# Patient Record
Sex: FEMALE | Race: WHITE | NOT HISPANIC OR LATINO | Employment: FULL TIME | URBAN - METROPOLITAN AREA
[De-identification: names, ages, dates, MRNs, and addresses within clinical notes are randomized per-mention and may not be internally consistent; named-entity substitution may affect disease eponyms.]

---

## 2017-01-18 ENCOUNTER — PRENATAL OFFICE VISIT (OUTPATIENT)
Dept: OBGYN | Facility: CLINIC | Age: 47
End: 2017-01-18
Payer: COMMERCIAL

## 2017-01-18 VITALS
WEIGHT: 174 LBS | OXYGEN SATURATION: 98 % | HEART RATE: 65 BPM | SYSTOLIC BLOOD PRESSURE: 109 MMHG | DIASTOLIC BLOOD PRESSURE: 72 MMHG | BODY MASS INDEX: 28.96 KG/M2

## 2017-01-18 DIAGNOSIS — N93.9 ABNORMAL UTERINE BLEEDING: Primary | ICD-10-CM

## 2017-01-18 LAB — HGB BLD-MCNC: 15.7 G/DL (ref 11.7–15.7)

## 2017-01-18 PROCEDURE — 36415 COLL VENOUS BLD VENIPUNCTURE: CPT | Performed by: OBSTETRICS & GYNECOLOGY

## 2017-01-18 PROCEDURE — 99203 OFFICE O/P NEW LOW 30 MIN: CPT | Performed by: OBSTETRICS & GYNECOLOGY

## 2017-01-18 PROCEDURE — 85018 HEMOGLOBIN: CPT | Performed by: OBSTETRICS & GYNECOLOGY

## 2017-01-18 NOTE — MR AVS SNAPSHOT
After Visit Summary   1/18/2017    Carol Ann Anguiano    MRN: [id number]           Patient Information     Date Of Birth          1970        Visit Information        Provider Department      1/18/2017 9:15 AM Javier Huynh MD [facility]        Today's Diagnoses     Abnormal uterine bleeding    -  1        Follow-ups after your visit        Your next 10 appointments already scheduled     Jan 25, 2017  9:00 AM   US PELVIC COMPLETE W TRANSVAGINAL with [id number]   [facility]    [address] [place] [id number]   [phone]           Please bring a list of your medicines (including vitamins, minerals and over-the-counter drugs). Also, tell your doctor about any allergies you may have. Wear comfortable clothes and leave your valuables at home.  Adults: Drink four 8-ounce glasses of fluid one hour before your exam. Do NOT empty your bladder.  If you need to empty your bladder before your exam, try to release only a little bit of urine. Then, drink another 8oz glass of fluid.  Children: Children who are potty trained should drink at least 4 cups (32 oz) of liquid 45 minutes to one hour prior to the exam. The child s bladder must be full in order to achieve a diagnostic exam. If your child is very uncomfortable or has an urgent need to pee, please notify a technologist; they will try to find out how much longer the wait may be and provide instructions to help relieve the pressure. Occasionally it is medically necessary to insert a urinary catheter to fill the bladder.  Please call the Imaging Department at your exam site with any questions.              Future tests that were ordered for you today     Open Future Orders        Priority Expected Expires Ordered    US Pelvic Complete with Transvaginal Routine  1/18/2018 1/18/2017            Who to contact     If you have questions or need follow up information about today's  clinic visit or your schedule please contact [facility] directly at [phone].  Normal or non-critical lab and imaging results will be communicated to you by MyChart, letter or phone within 4 business days after the clinic has received the results. If you do not hear from us within 7 days, please contact the clinic through [facility] or phone. If you have a critical or abnormal lab result, we will notify you by phone as soon as possible.  Submit refill requests through [employer] or call your pharmacy and they will forward the refill request to us. Please allow 3 business days for your refill to be completed.          Additional Information About Your Visit        [facility] Information     [employer] gives you secure access to your electronic health record. If you see a primary care provider, you can also send messages to your care team and make appointments. If you have questions, please call your primary care clinic.  If you do not have a primary care provider, please call [phone] and they will assist you.        Care EveryWhere ID     This is your Care EveryWhere ID. This could be used by other organizations to access your [place] medical records  [id number]        Your Vitals Were     Pulse Pulse Oximetry Last Period             65 98% 01/06/2017          Blood Pressure from Last 3 Encounters:   01/18/17 109/72   10/07/16 118/72   07/25/16 122/78    Weight from Last 3 Encounters:   01/18/17 174 lb (78.926 kg)   12/07/16 165 lb 6.4 oz (75.025 kg)   10/07/16 164 lb 8 oz (74.617 kg)              We Performed the Following     Hemoglobin        Primary Care Provider Office Phone # Fax #    Roby Charles PA-C [phone]       [address] [address]        Thank you!     Thank you for choosing [facility]  for your care. Our goal is always to provide you with excellent care. Hearing back from our patients is one way we  can continue to improve our services. Please take a few minutes to complete the written survey that you may receive in the mail after your visit with us. Thank you!             Your Updated Medication List - Protect others around you: Learn how to safely use, store and throw away your medicines at www.disposemymeds.org.          This list is accurate as of: 1/18/17 10:27 AM.  Always use your most recent med list.                   Brand Name Dispense Instructions for use    albuterol 108 (90 BASE) MCG/ACT Inhaler    PROAIR HFA/PROVENTIL HFA/VENTOLIN HFA    1 Inhaler    Inhale 2 puffs into the lungs every 6 hours as needed for shortness of breath / dyspnea or wheezing       clotrimazole 1 % cream    LOTRIMIN    15 g    Apply a pea sized amount to ear canal, twice daily       fluocinolone acetonide 0.01 % Oil     20 mL    Place 5 drops in ear(s) 2 times daily       hydrocortisone 25 MG Suppository    ANUSOL-HC    28 suppository    Place 1 suppository (25 mg) rectally 2 times daily       ketoconazole 2 % cream    NIZORAL    30 g    Apply topically 2 times daily for 28 days       levothyroxine 200 MCG tablet    SYNTHROID/LEVOTHROID    90 tablet    Take 1 tablet (200 mcg) by mouth daily       neomycin-polymyxin-hydrocortisone 3.5-20410-5 otic suspension    CORTISPORIN    10 mL    Place 4 drops in ear(s) 4 times daily       omeprazole 40 MG capsule    priLOSEC    90 capsule    Take 1 capsule (40 mg) by mouth daily Take 30-60 minutes before a meal.       ondansetron 4 MG tablet    ZOFRAN    30 tablet    1 to 2 as needed for nausea

## 2017-01-18 NOTE — PROGRESS NOTES
Carol Ann is a 46 year old  referred here by LUIS Venegas with complaint of abnormal uterine bleeding.    She reports that the bleeding is heavier and last longer.  The bleeding occurs every 14-15 days.  She has quarter sized blood clots the first couple of days.  She did not have blood clots previously (occasionally small clots).   She will use about 8-9 pads the first couple of days.  She changes the pads more frequently due to effects of pad use on the perineum.    No aggravating of alleviating factors.  She does not know of any causative effects.    She does have some lower back and cramping pains the first day or two of bleeding.    Upon review of the chart, it appears she had some irregular bleeding previously, as she had some workup in .      Previously she would have bleeding every 25 days and they would last for 4 days.  She did not get cramps previously.      She occasionally feels off balance going up stairs, not often.  She has not had any shortness of breath.  She has noticed some fatigue, a couple times a week.        Component      Latest Ref Rng 3/3/2015 2015 2016 2016 10/7/2016   WBC      4.0 - 11.0 10e9/L 8.0       RBC Count      3.8 - 5.2 10e12/L 4.54       Hemoglobin      11.7 - 15.7 g/dL 14.0       Hematocrit      35.0 - 47.0 % 41.3       MCV      78 - 100 fl 91       MCH      26.5 - 33.0 pg 30.8       MCHC      31.5 - 36.5 g/dL 33.9       RDW      10.0 - 15.0 % 14.7       Platelet Count      150 - 450 10e9/L 296       Iron      35 - 180 ug/dL     54   Iron Binding Cap      240 - 430 ug/dL     379   Iron Saturation Index      15 - 46 %     14 (L)   TSH      0.40 - 4.00 mU/L 19.75 (H) <0.01 (L) 0.07 (L) 0.02 (L) 0.37 (L)   T4 Free      0.76 - 1.46 ng/dL  1.78 (H)  1.39 1.32   Free T3      2.3 - 4.2 pg/mL    2.7 2.1 (L)   Ferritin      8 - 252 ng/mL     36       The patient and I reviewed the following report and the ultrasound films.    Jacqueline Marsh on 3/13/2015  3:37 PM       ULTRASOUND - PELVIC GYN    Referring MD:Roby Charles PA-C  Primary Clinic: [facility]      ===================================    CLINICAL INFORMATION    Indications for ultrasound:   Irreg menses    LMP: 1 mo. ago    Measurements:  Uterus: L: 8.1cm. T: 4.1cm. H:3.8 cm  Position is anteverted.     Endo cav: 11.5 mm     Right ovary: L 2.0 cm. T: 1.6 cm. H:1.1 cm  Left ovary: L: 3.0 cm. T: 2.1 cm. H:1.9 cm    Simple cyst Lt. Ovary:1.3 x 1.6 x 1.3 cm  Cul de sac: no free fluid      ===================================  SONOGRAPHER NARRATIVE:   Type of ultrasound: Transvaginal                Assessment:      The uterus and ovaries were visualized and no abnormalities were seen.  The left ovary contains a dominant follicle.    Radha Mcdaniel           Past Medical History   Diagnosis Date     Unspecified hypothyroidism      ASTHMA - MILD INTERMITTENT 2007     Coronary artery disease      Grandfather and all 3 uncles have had heart attacks     Hypertension      Mother and grandmother     Family history of cancer      grandfather     Arthritis      great aunt     Depressive disorder      myself and my mother       Past Surgical History   Procedure Laterality Date     Cholecystectomy, laporoscopic       Cholecystectomy, Laparoscopic     Lasik bilateral         Obstetric History       T1      TAB0   SAB0   E0   M0   L2       # Outcome Date GA Lbr Fabricio/2nd Weight Sex Delivery Anes PTL Lv   2 Term            1                    Gynecological History         Patient's last menstrual period was 2017.    noSTD/no PID/no IUD      see above HPI          Allergies   Allergen Reactions     Chantix [Varenicline]      Stomach cramps      Eryc [Erythromycin]      dry heaves  nausea       Current Outpatient Prescriptions   Medication Sig Dispense Refill     fluocinolone acetonide 0.01 % OIL Place 5 drops in ear(s) 2 times daily 20 mL 1     albuterol (PROAIR HFA,  PROVENTIL HFA, VENTOLIN HFA) 108 (90 BASE) MCG/ACT inhaler Inhale 2 puffs into the lungs every 6 hours as needed for shortness of breath / dyspnea or wheezing 1 Inhaler 1     clotrimazole (LOTRIMIN) 1 % cream Apply a pea sized amount to ear canal, twice daily 15 g 1     levothyroxine (SYNTHROID, LEVOTHROID) 200 MCG tablet Take 1 tablet (200 mcg) by mouth daily 90 tablet 2     neomycin-polymyxin-hydrocortisone (CORTISPORIN) 3.5-14752-4 otic suspension Place 4 drops in ear(s) 4 times daily 10 mL 0     omeprazole (PRILOSEC) 40 MG capsule Take 1 capsule (40 mg) by mouth daily Take 30-60 minutes before a meal. 90 capsule 0     ketoconazole (NIZORAL) 2 % cream Apply topically 2 times daily for 28 days 30 g 3     hydrocortisone (ANUSOL-HC) 25 MG suppository Place 1 suppository (25 mg) rectally 2 times daily 28 suppository 0     ondansetron (ZOFRAN) 4 MG tablet 1 to 2 as needed for nausea 30 tablet 0       Social History     Social History     Marital Status:      Spouse Name: Saul     Number of Children: 2     Years of Education: 14     Occupational History      [employer]     Social History Main Topics     Smoking status: Current Every Day Smoker -- 1.00 packs/day for 15 years     Types: Cigarettes     Smokeless tobacco: Never Used     Alcohol Use: 0.0 oz/week     0 Standard drinks or equivalent per week      Comment: occasionally     Drug Use: No     Sexual Activity:     Partners: Male     Birth Control/ Protection: None      Comment: my  has a vasectomy     Other Topics Concern     Parent/Sibling W/ Cabg, Mi Or Angioplasty Before 65f 55m? No     Social History Narrative    Dairy/d 3-4 servings/d.     Caffeine 1 servings/d    Exercise 0 x week very active    Sunscreen used - Yes    Seatbelts used - Yes    Working smoke/CO detectors in the home - Yes    Guns stored in the home - No    Self Breast Exams - Yes    Self Testicular Exam - NA    Eye Exam up to date - Due for an  appointment.     Dental Exam up to date - Due for an appointment.     Pap Smear up to date - Yes    Mammogram up to date - n/a    PSA up to date - NA    Dexa Scan up to date - NA    Flex Sig / Colonoscopy up to date - NA    Immunizations up to date - Yes    Abuse: Current or Past(Physical, Sexual or Emotional)- No    Do you feel safe in your environment - Yes    Saul PriceDREA santos    [birth year]07           Family History   Problem Relation Age of Onset     CANCER Paternal Grandfather      lungs/throat     Psychotic Disorder Son      adhd/ocd     DIABETES Father      Hypertension Mother      Hypertension Maternal Grandmother      Depression Mother      Asthma Other      Thyroid Disease Other          Review of Systems:  10 point ROS of systems including Constitutional, Eyes, Respiratory, Cardiovascular, Gastroenterology, Genitourinary, Integumentary, Muscularskeletal, Psychiatric were all negative except for pertinent positives noted in my HPI and in the PMH.          EXAM:  /72 mmHg  Pulse 65  Wt 174 lb (78.926 kg)  SpO2 98%  LMP 01/06/2017  Body mass index is 28.96 kg/(m^2).  General Appearance:  healthy, alert, active, no distress  Neck:  No masses or lesions carotids are +2/4. No bruits heard  Skin:  Normal skin turgor  Neuro:  Alert, cranial nerves grossly intact  HEENT: NCAT  Pelvic exam:  Not performed today   Extremities:  No clubbing, cyanosis or edema.      ASSESSMENT:  Abnormal uterine bleeding     PLAN:  We discussed the bleeding profiles as people age and approach menopause.  Even though menstrual changes and irregularities are common and expected, they may also indicate or precipitate endometrial abnormalities.   The patient and I discussed the options for evaluation.  The EMB, SIS and the D&C were reviewed with her.  The EMB will not remove a polyp, but will give a tissue sample.  The SIS will further evaluate the lining, but no tissue sample, and should she have a suspected polyp, it would not be  removed.  The D&C is more expensive but is currently the gold standard.   She will think about these further and we will review at her next visit.   Together we reviewed the risks and benefits of medical versus surgical therapy.  Medical therapy reviewed included hormonal manipulation with OCP's, Patch, Ring, Depo, or IUD.     Schedule for the repeat ultrasound  hgb today  RTC for further discussion of the results and options.    TT 30 min  CT and review of records, as noted in the HPI and in the Plan, greater than 50%  Javier Huynh MD

## 2017-01-18 NOTE — NURSING NOTE
"Chief Complaint   Patient presents with     Consult     Abnormal bleeding per Dr. Charles       Initial /72 mmHg  Pulse 65  Wt 174 lb (78.926 kg)  SpO2 98%  LMP 01/06/2017 Estimated body mass index is 28.96 kg/(m^2) as calculated from the following:    Height as of 12/7/16: 5' 5\" (1.651 m).    Weight as of this encounter: 174 lb (78.926 kg).  BP completed using cuff size: brandy Mason MA 1/18/2017         "

## 2017-01-25 ENCOUNTER — RADIANT APPOINTMENT (OUTPATIENT)
Dept: ULTRASOUND IMAGING | Facility: CLINIC | Age: 47
End: 2017-01-25
Attending: OBSTETRICS & GYNECOLOGY
Payer: COMMERCIAL

## 2017-01-25 DIAGNOSIS — N93.9 ABNORMAL UTERINE BLEEDING: ICD-10-CM

## 2017-01-25 PROCEDURE — 76856 US EXAM PELVIC COMPLETE: CPT

## 2017-01-25 PROCEDURE — 76830 TRANSVAGINAL US NON-OB: CPT

## 2017-02-03 ENCOUNTER — OFFICE VISIT (OUTPATIENT)
Dept: OBGYN | Facility: CLINIC | Age: 47
End: 2017-02-03
Payer: COMMERCIAL

## 2017-02-03 VITALS
SYSTOLIC BLOOD PRESSURE: 122 MMHG | DIASTOLIC BLOOD PRESSURE: 76 MMHG | OXYGEN SATURATION: 98 % | HEART RATE: 80 BPM | HEIGHT: 65 IN | WEIGHT: 166.2 LBS | BODY MASS INDEX: 27.69 KG/M2

## 2017-02-03 DIAGNOSIS — N93.9 ABNORMAL UTERINE BLEEDING: Primary | ICD-10-CM

## 2017-02-03 DIAGNOSIS — R93.5 ABNORMAL ULTRASOUND OF UTERUS: ICD-10-CM

## 2017-02-03 PROCEDURE — 99214 OFFICE O/P EST MOD 30 MIN: CPT | Performed by: OBSTETRICS & GYNECOLOGY

## 2017-02-03 NOTE — NURSING NOTE
"Chief Complaint   Patient presents with     RECHECK     follow up US results possible EMB       Initial /76 mmHg  Pulse 80  Ht 5' 5\" (1.651 m)  Wt 166 lb 3.2 oz (75.388 kg)  BMI 27.66 kg/m2  SpO2 98%  LMP 01/02/2017  Breastfeeding? No Estimated body mass index is 27.66 kg/(m^2) as calculated from the following:    Height as of this encounter: 5' 5\" (1.651 m).    Weight as of this encounter: 166 lb 3.2 oz (75.388 kg).  BP completed using cuff size: brandy Mason MA 2/3/2017         "

## 2017-02-03 NOTE — PROGRESS NOTES
Carol Ann is a 46 year old , who was initially referred here by LUIS Venegas with complaint of abnormal uterine bleeding.  she is here today for follow up of the abnormal bleeding.     As noted previously, the bleeding is heavier and last longer.  The bleeding occurs every 14-15 days.  She has quarter sized blood clots the first couple of days.  She did not have blood clots previously (occasionally small clots).    She will use about 8-9 pads the first couple of days.  She changes the pads more frequently due to effects of pad use on the perineum.     No aggravating of alleviating factors.  She does not know of any causative effects.     She does have some lower back and cramping pains the first day or two of bleeding.     Upon review of the chart, it appears she had some irregular bleeding previously, as she had some workup in .      Previously she would have bleeding every 25 days and they would last for 4 days.  She did not get cramps previously.      She occasionally feels off balance going up stairs, not often.  She has not had any shortness of breath.  She has noticed some fatigue, a couple times a week.          Component      Latest Ref Rng  3/3/2015  2015  2016  2016  10/7/2016    WBC      4.0 - 11.0 10e9/L  8.0            RBC Count      3.8 - 5.2 10e12/L  4.54            Hemoglobin      11.7 - 15.7 g/dL  14.0            Hematocrit      35.0 - 47.0 %  41.3            MCV      78 - 100 fl  91            MCH      26.5 - 33.0 pg  30.8            MCHC      31.5 - 36.5 g/dL  33.9            RDW      10.0 - 15.0 %  14.7            Platelet Count      150 - 450 10e9/L  296            Iron      35 - 180 ug/dL          54    Iron Binding Cap      240 - 430 ug/dL          379    Iron Saturation Index      15 - 46 %          14 (L)    TSH      0.40 - 4.00 mU/L  19.75 (H)  <0.01 (L)  0.07 (L)  0.02 (L)  0.37 (L)    T4 Free      0.76 - 1.46 ng/dL    1.78 (H)    1.39  1.32    Free T3       2.3 - 4.2 pg/mL        2.7  2.1 (L)    Ferritin      8 - 252 ng/mL          36        The patient and I reviewed the following report and the ultrasound films.    Jacqueline Marsh on 3/13/2015 3:37 PM          ULTRASOUND - PELVIC GYN    Referring MD:Roby Charles PA-C  Primary Clinic: [facility]      ===================================    CLINICAL INFORMATION    Indications for ultrasound:   Irreg menses    LMP: 1 mo. ago    Measurements:  Uterus: L: 8.1cm. T: 4.1cm. H:3.8 cm  Position is anteverted.     Endo cav: 11.5 mm     Right ovary: L 2.0 cm. T: 1.6 cm. H:1.1 cm  Left ovary: L: 3.0 cm. T: 2.1 cm. H:1.9 cm    Simple cyst Lt. Ovary:1.3 x 1.6 x 1.3 cm  Cul de sac: no free fluid      ===================================  SONOGRAPHER NARRATIVE:   Type of ultrasound: Transvaginal                   Assessment:      The uterus and ovaries were visualized and no abnormalities were seen.  The left ovary contains a dominant follicle.       She had ultrasound and the following was noted.  We reviewed the ultrasound report and the ultrasound films.      ULTRASOUND PELVIC COMPLETE WITH TRANSVAGINAL IMAGING 1/25/2017 9:30 AM  HISTORY: Frequent, heavy bleeding. Abnormal uterine and vaginal bleeding, unspecified.  FINDINGS:  Transvaginal images were performed to better evaluate the patient's uterus, ovaries and endometrial stripe.  The uterus is normal in size measuring 7.1 x 4.2 x 4.2 cm. There is a myometrial cyst measuring 0.6 cm in maximal diameter in the anterior uterine body. Endometrial stripe measures 11 mm and is mildly thickened for patient's age. A slight hypoechoic area in the fundal myometrium could represent a small submucosal fibroid, but more likely a polyp measuring 0.7 x 0.5 x 0.7 cm. The right ovary is normal measuring 3.0 x 2.9 x 1.6 cm. The left ovary is normal measuring 1.8 x 1.8 x 1.0 cm. The ovaries demonstrate normal color Doppler flow bilaterally. No adnexal masses are present. No  free pelvic fluid is present.                                                               IMPRESSION: Suspected endometrial polyp in the region of the fundal endometrium. Suspected polyp measures up to 0.7 cm in size. No endometrial fluid is evident. Follow-up hysterosonogram could be performed for further assessment if clinically warranted. Small submucosal fibroid remains in the differential. Ovaries are unremarkable. Small cyst anterior uterine body is of doubtful clinical significance.      Past Medical History   Diagnosis Date     Unspecified hypothyroidism      ASTHMA - MILD INTERMITTENT 6/11/2007     Coronary artery disease      Grandfather and all 3 uncles have had heart attacks     Hypertension      Mother and grandmother     Family history of cancer      grandfather     Arthritis      great aunt     Depressive disorder      myself and my mother     Past Surgical History   Procedure Laterality Date     Cholecystectomy, laporoscopic  2008     Cholecystectomy, Laparoscopic     Lasik bilateral          Allergies   Allergen Reactions     Chantix [Varenicline]      Stomach cramps      Eryc [Erythromycin]      dry heaves  nausea       Current Outpatient Prescriptions   Medication Sig Dispense Refill     fluocinolone acetonide 0.01 % OIL Place 5 drops in ear(s) 2 times daily 20 mL 1     albuterol (PROAIR HFA, PROVENTIL HFA, VENTOLIN HFA) 108 (90 BASE) MCG/ACT inhaler Inhale 2 puffs into the lungs every 6 hours as needed for shortness of breath / dyspnea or wheezing 1 Inhaler 1     hydrocortisone (ANUSOL-HC) 25 MG suppository Place 1 suppository (25 mg) rectally 2 times daily 28 suppository 0     clotrimazole (LOTRIMIN) 1 % cream Apply a pea sized amount to ear canal, twice daily 15 g 1     levothyroxine (SYNTHROID, LEVOTHROID) 200 MCG tablet Take 1 tablet (200 mcg) by mouth daily 90 tablet 2     neomycin-polymyxin-hydrocortisone (CORTISPORIN) 3.5-53039-1 otic suspension Place 4 drops in ear(s) 4 times daily 10  mL 0     omeprazole (PRILOSEC) 40 MG capsule Take 1 capsule (40 mg) by mouth daily Take 30-60 minutes before a meal. 90 capsule 0     ondansetron (ZOFRAN) 4 MG tablet 1 to 2 as needed for nausea 30 tablet 0       Social History     Social History     Marital Status:      Spouse Name: Saul     Number of Children: 2     Years of Education: 14     Occupational History      [employer]     Social History Main Topics     Smoking status: Current Every Day Smoker -- 1.00 packs/day for 15 years     Types: Cigarettes     Smokeless tobacco: Never Used     Alcohol Use: 0.0 oz/week     0 Standard drinks or equivalent per week      Comment: occasionally     Drug Use: No     Sexual Activity:     Partners: Male     Birth Control/ Protection: None      Comment: my  has a vasectomy     Other Topics Concern     Parent/Sibling W/ Cabg, Mi Or Angioplasty Before 65f 55m? No     Social History Narrative    Dairy/d 3-4 servings/d.     Caffeine 1 servings/d    Exercise 0 x week very active    Sunscreen used - Yes    Seatbelts used - Yes    Working smoke/CO detectors in the home - Yes    Guns stored in the home - No    Self Breast Exams - Yes    Self Testicular Exam - NA    Eye Exam up to date - Due for an appointment.     Dental Exam up to date - Due for an appointment.     Pap Smear up to date - Yes    Mammogram up to date - n/a    PSA up to date - NA    Dexa Scan up to date - NA    Flex Sig / Colonoscopy up to date - NA    Immunizations up to date - Yes    Abuse: Current or Past(Physical, Sexual or Emotional)- No    Do you feel safe in your environment - Yes    Saul Blackman MA    9/21/07           Family History   Problem Relation Age of Onset     CANCER Paternal Grandfather      lungs/throat     Psychotic Disorder Son      adhd/ocd     DIABETES Father      Hypertension Mother      Hypertension Maternal Grandmother      Depression Mother      Asthma Other      Thyroid Disease Other   "      Review of Systems:  10 point ROS of systems including Constitutional, Eyes, Respiratory, Cardiovascular, Gastroenterology, Genitourinary, Integumentary, Muscularskeletal, Psychiatric were all negative except for pertinent positives noted in my HPI and in the PMH.      Exam  /76 mmHg  Pulse 80  Ht 5' 5\" (1.651 m)  Wt 166 lb 3.2 oz (75.388 kg)  BMI 27.66 kg/m2  SpO2 98%  LMP 01/02/2017  Breastfeeding? No  General:  WNWD female, NAD  Alert  Oriented x 3  Gait:  Normal  Skin:  Normal skin turgor  HEENT:  NC/AT, EOMI  Abdomen:  Non-tender, non-distended.  Pelvic exam:  Not performed  Extremities:  No clubbing, no cyanosis and no edema.        Assessment  Abnormal uterine bleeding  Suspected endometrial polyp      Plan  We reviewed the history and the ultrasound findings. With a suspected polyp, I would suggest to proceed with the D&C with hysteroscopy.  The ACOG pamphlet was given to her and reviewed with her.    The risks and benefits and goals and limitations were reviewed.  The polyp might be the source of the bleeding.  However, she is also at the age where hormonal irregularities might be the cause also.  She will do the D&C with hysteroscopy and if the irregular/abnormal bleeding continues, then we should consider hormonal management.    The risks of the planned procedure include, but are not limited to, death, brain damage, paralysis of any or all limbs, loss of an organ, function of an organ, disfiguring scars, bleeding, infection, damage to the uterus, tubes, ovaries, bowel, bladder, cervix and vagina.  In addition, there is a possibility of other procedures that might be deemed necessary at the time of the surgery, depending upon findings and/or complications.  This may also include laparoscopy, laparotomy, and rarely colostomy (which often times can be reversible in the future).  Risks with blood include but are not limited to HIV/AIDS, hepatitis and transfusion reactions, with transfusion " reactions being the greatest risk.  She will schedule the preop with Roby Charles, her PCP.     Javier Huynh MD

## 2017-02-07 PROBLEM — N93.9 ABNORMAL UTERINE BLEEDING: Status: ACTIVE | Noted: 2017-02-07

## 2017-02-14 ENCOUNTER — TELEPHONE (OUTPATIENT)
Dept: OBGYN | Facility: CLINIC | Age: 47
End: 2017-02-14

## 2017-02-14 NOTE — TELEPHONE ENCOUNTER
Surgery Scheduled.    Date of Surgery 3/30/17 Time of Surgery 7:30 am  Procedure: Hysteroscopy/ D&C  Hospital/Surgical Facility[place] [facility]  Surgeon: Dr. Huynh  Type of Anesthesia Anticipated: Local with MAC  Pre-op: 2/20/17 with Gurjit Charles at [facility]  2 week post op: 4/18/17 with Dr. Huynh at  [facility]  Pre-certification 2/16/17  Consent signed: NA  Hospital Stay NA       [facility] surgery packet mailed to patient's home address.  Patient instructed NPO 12 hours prior to surgery, arrive 1 1/2 hours prior to surgery, must have a .  Patient understood and agrees to the plan.      Surgery Pre-Certification     Medical Record Number: [id number]   Carol Ann Anguiano   YOB: 1970   Phone: [phone] (home) [phone] (work)   Primary Provider: Roby Charles     Reason for Admit:   Abnormal Bleeding Problem/ Endometrial Polyp     Surgeon: MAHNAZ Huynh MD   Surgical Procedure: Hysteroscopy/ D&C   ICD-9 Coded: N93.9/ N84.0   Date of Surgery: 3/30/17   Consent signed? N/A       Hospital: [facility]   Outpatient     Requestor:   Marika Parmar     Location:   [facility]   Lynne Vnaessa CMA

## 2017-02-20 ENCOUNTER — OFFICE VISIT (OUTPATIENT)
Dept: FAMILY MEDICINE | Facility: CLINIC | Age: 47
End: 2017-02-20
Payer: COMMERCIAL

## 2017-02-20 VITALS
SYSTOLIC BLOOD PRESSURE: 120 MMHG | HEIGHT: 65 IN | WEIGHT: 171 LBS | HEART RATE: 76 BPM | BODY MASS INDEX: 28.49 KG/M2 | TEMPERATURE: 98.7 F | DIASTOLIC BLOOD PRESSURE: 80 MMHG

## 2017-02-20 DIAGNOSIS — K64.9 HEMORRHOIDS, UNSPECIFIED HEMORRHOID TYPE: ICD-10-CM

## 2017-02-20 DIAGNOSIS — R11.0 NAUSEA: ICD-10-CM

## 2017-02-20 DIAGNOSIS — J45.20 INTERMITTENT ASTHMA, UNCOMPLICATED: ICD-10-CM

## 2017-02-20 DIAGNOSIS — L98.9 BENIGN SKIN LESION OF NECK: Primary | ICD-10-CM

## 2017-02-20 PROCEDURE — 17110 DESTRUCTION B9 LES UP TO 14: CPT | Performed by: PHYSICIAN ASSISTANT

## 2017-02-20 PROCEDURE — 99213 OFFICE O/P EST LOW 20 MIN: CPT | Mod: 25 | Performed by: PHYSICIAN ASSISTANT

## 2017-02-20 RX ORDER — ALBUTEROL SULFATE 90 UG/1
2 AEROSOL, METERED RESPIRATORY (INHALATION) EVERY 6 HOURS PRN
Qty: 1 INHALER | Refills: 1 | Status: SHIPPED | OUTPATIENT
Start: 2017-02-20 | End: 2017-04-20

## 2017-02-20 RX ORDER — OMEPRAZOLE 40 MG/1
40 CAPSULE, DELAYED RELEASE ORAL DAILY
Qty: 90 CAPSULE | Refills: 3 | Status: SHIPPED | OUTPATIENT
Start: 2017-02-20 | End: 2019-03-29

## 2017-02-20 RX ORDER — HYDROCORTISONE ACETATE 25 MG/1
25 SUPPOSITORY RECTAL 2 TIMES DAILY
Qty: 28 SUPPOSITORY | Refills: 0 | Status: SHIPPED | OUTPATIENT
Start: 2017-02-20 | End: 2017-03-28

## 2017-02-20 NOTE — MR AVS SNAPSHOT
After Visit Summary   2/20/2017    Carol Ann Anguiano    MRN: [id number]           Patient Information     Date Of Birth          1970        Visit Information        Provider Department      2/20/2017 1:40 PM Roby Charles PA-C [facility]        Today's Diagnoses     Benign skin lesion of neck    -  1    Intermittent asthma, uncomplicated        Hemorrhoids, unspecified hemorrhoid type        Nausea           Follow-ups after your visit        Your next 10 appointments already scheduled     Mar 20, 2017  3:40 PM CDT   [facility] Visit with Roby Charles PA-C   [facility]    [address] [id number] [phone]           Bring a current list of meds and any records pertaining to this visit.  For Physicals, please bring immunization records and any forms needing to be filled out.  Please arrive 10 minutes early to complete paperwork.            Apr 18, 2017  3:00 PM CDT   Post Op with Javier Huynh MD   [facility] ([address] [id number] [phone]              Who to contact     If you have questions or need follow up information about today's clinic visit or your schedule please contact [facility] directly at [phone].  Normal or non-critical lab and imaging results will be communicated to you by MyChart, letter or phone within 4 business days after the clinic has received the results. If you do not hear from us within 7 days, please contact the clinic through MyChart or phone. If you have a critical or abnormal lab result, we will notify you by phone as soon as possible.  Submit refill requests through [employer] or call your pharmacy and they will forward the refill request to us. Please allow 3 business days for your refill to be completed.          Additional Information About Your Visit    "     MyChart Information     [employer] gives you secure access to your electronic health record. If you see a primary care provider, you can also send messages to your care team and make appointments. If you have questions, please call your primary care clinic.  If you do not have a primary care provider, please call [phone] and they will assist you.        Care EveryWhere ID     This is your Care EveryWhere ID. This could be used by other organizations to access your [place] medical records  [id number]        Your Vitals Were     Pulse Temperature Height Last Period BMI (Body Mass Index)       76 98.7  F (37.1  C) (Oral) 5' 5\" (1.651 m) 01/02/2017 28.46 kg/m2        Blood Pressure from Last 3 Encounters:   02/20/17 120/80   02/03/17 122/76   01/18/17 109/72    Weight from Last 3 Encounters:   02/20/17 171 lb (77.6 kg)   02/03/17 166 lb 3.2 oz (75.4 kg)   01/18/17 174 lb (78.9 kg)              We Performed the Following     DESTRUCT BENIGN LESION, UP TO 14          Where to get your medicines      These medications were sent to [id number] [place]  [address] AT [facility] & [address]  [address] [place] [address]     Phone:  [phone]     albuterol 108 (90 BASE) MCG/ACT Inhaler    hydrocortisone 25 MG Suppository    omeprazole 40 MG capsule          Primary Care Provider Office Phone # Fax #    Roby Charles PA-C [phone]       [address] [address]        Thank you!     Thank you for choosing [facility]  for your care. Our goal is always to provide you with excellent care. Hearing back from our patients is one way we can continue to improve our services. Please take a few minutes to complete the written survey that you may receive in the mail after your visit with us. Thank you!             Your Updated Medication List - Protect others around you: Learn how " to safely use, store and throw away your medicines at www.disposemymeds.org.          This list is accurate as of: 2/20/17  2:32 PM.  Always use your most recent med list.                   Brand Name Dispense Instructions for use    albuterol 108 (90 BASE) MCG/ACT Inhaler    PROAIR HFA/PROVENTIL HFA/VENTOLIN HFA    1 Inhaler    Inhale 2 puffs into the lungs every 6 hours as needed for shortness of breath / dyspnea or wheezing       fluocinolone acetonide 0.01 % Oil     20 mL    Place 5 drops in ear(s) 2 times daily       hydrocortisone 25 MG Suppository    ANUSOL-HC    28 suppository    Place 1 suppository (25 mg) rectally 2 times daily       levothyroxine 200 MCG tablet    SYNTHROID/LEVOTHROID    90 tablet    Take 1 tablet (200 mcg) by mouth daily       omeprazole 40 MG capsule    priLOSEC    90 capsule    Take 1 capsule (40 mg) by mouth daily Take 30-60 minutes before a meal.

## 2017-03-20 ENCOUNTER — OFFICE VISIT (OUTPATIENT)
Dept: FAMILY MEDICINE | Facility: CLINIC | Age: 47
End: 2017-03-20
Payer: COMMERCIAL

## 2017-03-20 VITALS
TEMPERATURE: 98.9 F | HEIGHT: 65 IN | SYSTOLIC BLOOD PRESSURE: 118 MMHG | BODY MASS INDEX: 29.01 KG/M2 | HEART RATE: 80 BPM | WEIGHT: 174.13 LBS | DIASTOLIC BLOOD PRESSURE: 80 MMHG

## 2017-03-20 DIAGNOSIS — Z12.31 VISIT FOR SCREENING MAMMOGRAM: ICD-10-CM

## 2017-03-20 DIAGNOSIS — M79.661 BILATERAL CALF PAIN: ICD-10-CM

## 2017-03-20 DIAGNOSIS — Z01.818 PREOP GENERAL PHYSICAL EXAM: Primary | ICD-10-CM

## 2017-03-20 DIAGNOSIS — N93.9 ABNORMAL UTERINE BLEEDING: ICD-10-CM

## 2017-03-20 DIAGNOSIS — M79.662 BILATERAL CALF PAIN: ICD-10-CM

## 2017-03-20 DIAGNOSIS — J45.20 MILD INTERMITTENT ASTHMA WITHOUT COMPLICATION: ICD-10-CM

## 2017-03-20 DIAGNOSIS — K42.9 UMBILICAL HERNIA WITHOUT OBSTRUCTION AND WITHOUT GANGRENE: ICD-10-CM

## 2017-03-20 PROCEDURE — 99214 OFFICE O/P EST MOD 30 MIN: CPT | Performed by: PHYSICIAN ASSISTANT

## 2017-03-20 NOTE — NURSING NOTE
"Chief Complaint   Patient presents with     Pre-Op Exam       Initial There were no vitals taken for this visit. Estimated body mass index is 28.46 kg/(m^2) as calculated from the following:    Height as of 2/20/17: 5' 5\" (1.651 m).    Weight as of 2/20/17: 171 lb (77.6 kg).  Medication Reconciliation: complete   Iris Johnson CMA      "

## 2017-03-20 NOTE — PATIENT INSTRUCTIONS
"  1. To schedule ultrasound of feet (blood flow test) call:  [facility] Scheduling Phone Number: [phone]  Or   [facility] Scheduling Phone number: [phone]   2. Call enclosed number for general surgeon evaluation for \"belly button hernia\"     Before Your Surgery      Call your surgeon if there is any change in your health. This includes signs of a cold or flu (such as a sore throat, runny nose, cough, rash or fever).    Do not smoke, drink alcohol or take over the counter medicine (unless your surgeon or primary care doctor tells you to) for the 24 hours before and after surgery.    If you take prescribed drugs: Follow your doctor s orders about which medicines to take and which to stop until after surgery.    Eating and drinking prior to surgery: follow the instructions from your surgeon    Take a shower or bath the night before surgery. Use the soap your surgeon gave you to gently clean your skin. If you do not have soap from your surgeon, use your regular soap. Do not shave or scrub the surgery site.  Wear clean pajamas and have clean sheets on your bed.   "

## 2017-03-20 NOTE — MR AVS SNAPSHOT
"              After Visit Summary   3/20/2017    Carol Ann Anguiano    MRN: [id number]           Patient Information     Date Of Birth          1970        Visit Information        Provider Department      3/20/2017 3:40 PM Roby Charles PA-C [facility]        Today's Diagnoses     Preop general physical exam    -  1    Abnormal uterine bleeding        Bilateral calf pain        Mild intermittent asthma without complication        Umbilical hernia without obstruction and without gangrene        Visit for screening mammogram          Care Instructions      1. To schedule ultrasound of feet (blood flow test) call:  [facility] Scheduling Phone Number: [phone]  Or   [facility] Scheduling Phone number: [phone]   2. Call enclosed number for general surgeon evaluation for \"belly button hernia\"     Before Your Surgery      Call your surgeon if there is any change in your health. This includes signs of a cold or flu (such as a sore throat, runny nose, cough, rash or fever).    Do not smoke, drink alcohol or take over the counter medicine (unless your surgeon or primary care doctor tells you to) for the 24 hours before and after surgery.    If you take prescribed drugs: Follow your doctor s orders about which medicines to take and which to stop until after surgery.    Eating and drinking prior to surgery: follow the instructions from your surgeon    Take a shower or bath the night before surgery. Use the soap your surgeon gave you to gently clean your skin. If you do not have soap from your surgeon, use your regular soap. Do not shave or scrub the surgery site.  Wear clean pajamas and have clean sheets on your bed.         Follow-ups after your visit        Additional Services     GENERAL SURG ADULT REFERRAL       Your provider has referred you to: [facility]: [facility] Edmond Chavarria [phone]   " [email or web address]    Please be aware that coverage of these services is subject to the terms and limitations of your health insurance plan.  Call member services at your health plan with any benefit or coverage questions.      Please bring the following with you to your appointment:    (1) Any X-Rays, CTs or MRIs which have been performed.  Contact the facility where they were done to arrange for  prior to your scheduled appointment.   (2) List of current medications   (3) This referral request   (4) Any documents/labs given to you for this referral                  Your next 10 appointments already scheduled     Apr 18, 2017  3:00 PM CDT   Post Op with Javier Huynh MD   [facility]    [address] [facility] [address] [phone]              Future tests that were ordered for you today     Open Future Orders        Priority Expected Expires Ordered    US MAMADOU Doppler No Exercise Routine  3/20/2018 3/20/2017    MA SCREENING DIGITAL BILAT - Future  (s+30) Routine  3/20/2018 3/20/2017            Who to contact     If you have questions or need follow up information about today's clinic visit or your schedule please contact [facility] directly at [phone].  Normal or non-critical lab and imaging results will be communicated to you by MyChart, letter or phone within 4 business days after the clinic has received the results. If you do not hear from us within 7 days, please contact the clinic through [employer] or phone. If you have a critical or abnormal lab result, we will notify you by phone as soon as possible.  Submit refill requests through [employer] or call your pharmacy and they will forward the refill request to us. Please allow 3 business days for your refill to be completed.          Additional Information About Your Visit        [employer] Information     [employer] gives you secure access to your  "electronic health record. If you see a primary care provider, you can also send messages to your care team and make appointments. If you have questions, please call your primary care clinic.  If you do not have a primary care provider, please call [phone] and they will assist you.        Care EveryWhere ID     This is your Care EveryWhere ID. This could be used by other organizations to access your [facility] medical records  [id number]        Your Vitals Were     Pulse Temperature Height Last Period BMI (Body Mass Index)       80 98.9  F (37.2  C) (Oral) 5' 5\" (1.651 m) 03/17/2017 28.98 kg/m2        Blood Pressure from Last 3 Encounters:   03/20/17 118/80   02/20/17 120/80   02/03/17 122/76    Weight from Last 3 Encounters:   03/20/17 174 lb 2 oz (79 kg)   02/20/17 171 lb (77.6 kg)   02/03/17 166 lb 3.2 oz (75.4 kg)              We Performed the Following     GENERAL SURG ADULT REFERRAL        Primary Care Provider Office Phone # Fax #    Roby Charles PA-C [phone]       [facility] [address] [place] [address]        Thank you!     Thank you for choosing [facility]  for your care. Our goal is always to provide you with excellent care. Hearing back from our patients is one way we can continue to improve our services. Please take a few minutes to complete the written survey that you may receive in the mail after your visit with us. Thank you!             Your Updated Medication List - Protect others around you: Learn how to safely use, store and throw away your medicines at www.disposemymeds.org.          This list is accurate as of: 3/20/17  4:27 PM.  Always use your most recent med list.                   Brand Name Dispense Instructions for use    albuterol 108 (90 BASE) MCG/ACT Inhaler    PROAIR HFA/PROVENTIL HFA/VENTOLIN HFA    1 Inhaler    Inhale 2 puffs into the lungs every 6 hours as needed for shortness of breath / dyspnea or " wheezing       fluocinolone acetonide 0.01 % Oil     20 mL    Place 5 drops in ear(s) 2 times daily       hydrocortisone 25 MG Suppository    ANUSOL-HC    28 suppository    Place 1 suppository (25 mg) rectally 2 times daily       levothyroxine 200 MCG tablet    SYNTHROID/LEVOTHROID    90 tablet    Take 1 tablet (200 mcg) by mouth daily       omeprazole 40 MG capsule    priLOSEC    90 capsule    Take 1 capsule (40 mg) by mouth daily Take 30-60 minutes before a meal.

## 2017-03-20 NOTE — PROGRESS NOTES
[address] [id number] [phone]  Dept: [phone]    PRE-OP EVALUATION:  Today's date: 3/20/2017    Carol Ann Anguiano (: 1970) presents for pre-operative evaluation assessment as requested by Dr. Huynh.  She requires evaluation and anesthesia risk assessment prior to undergoing surgery/procedure for treatment of polyp on uterus .  Proposed procedure: removal of polyp    Date of Surgery/ Procedure: 3/30/17  Time of Surgery/ Procedure: 7:30am  Hospital/Surgical Facility: [facility]  Fax number for surgical facility:   Primary Physician: Roby Charles  Type of Anesthesia Anticipated: Local with MAC    Patient has a Health Care Directive or Living Will:  NO    1. NO - Do you have a history of heart attack, stroke, stent, bypass or surgery on an artery in the head, neck, heart or legs?  2. YES - DO YOU EVER HAVE ANY PAIN OR DISCOMFORT IN YOUR CHEST? - Probably GERD related. Has seen ER, had cardiac work up, negative stress Echo.   3. NO - Do you have a history of  Heart Failure?  4. NO - Are you troubled by shortness of breath when: walking on the level, up a slight hill or at night?  5. NO - Do you currently have a cold, bronchitis or other respiratory infection?  6. NO - Do you have a cough, shortness of breath or wheezing?  7. YES - DO YOU SOMETIMES GET PAINS IN THE CALVES OF YOUR LEGS WHEN YOU WALK? - With walking fast, longer distances - Resolves within minutes. Likely musculoskeletal   8. YES - DO YOU OR ANYONE IN YOUR FAMILY HAVE PREVIOUS HISTORY OF BLOOD CLOTS?  - Uncle and Grandfather   9. NO - Do you or does anyone in your family have a serious bleeding problem such as prolonged bleeding following surgeries or cuts?  10. NO - Have you ever had problems with anemia or been told to take iron pills?  11. YES - HAVE YOU HAD ANY ABNORMAL BLOOD LOSS SUCH AS BLACK, TARRY OR BLOODY STOOLS, OR ABNORMAL VAGINAL BLEEDING? Heavy  periods   12. NO - Have you ever had a blood transfusion?  13. YES - HAVE YOU OR ANY OF YOUR RELATIVES EVER HAD PROBLEMS WITH ANESTHESIA?  - Significant post operative nausea   14. NO - Do you have sleep apnea, excessive snoring or daytime drowsiness?  15. NO - Do you have any prosthetic heart valves?  16. NO - Do you have prosthetic joints?  17. NO - Is there any chance that you may be pregnant?      HPI:                                                      Brief HPI related to upcoming procedure:   Known polyp and heavy / irregular menses       MEDICAL HISTORY:                                                      Patient Active Problem List    Diagnosis Date Noted     Abnormal uterine bleeding 02/07/2017     Priority: Medium     Hypothyroidism, unspecified hypothyroidism type 10/09/2015     Priority: Medium     Tobacco use disorder 09/18/2012     Priority: Medium     Globus pharyngeus 05/01/2011     Priority: Medium     CARDIOVASCULAR SCREENING; LDL GOAL LESS THAN 160 05/09/2010     Priority: Medium     Mild intermittent asthma 06/11/2007     Priority: Medium      Past Medical History   Diagnosis Date     Arthritis      great aunt     ASTHMA - MILD INTERMITTENT 6/11/2007     Cancer (H)      grandfather     Coronary artery disease      Grandfather and all 3 uncles have had heart attacks     Depressive disorder      myself and my mother     Family history of cancer      grandfather     Hypertension      Mother and grandmother     Unspecified hypothyroidism      Past Surgical History   Procedure Laterality Date     Cholecystectomy, laporoscopic  2008     Cholecystectomy, Laparoscopic     Lasik bilateral       Current Outpatient Prescriptions   Medication Sig Dispense Refill     albuterol (PROAIR HFA/PROVENTIL HFA/VENTOLIN HFA) 108 (90 BASE) MCG/ACT Inhaler Inhale 2 puffs into the lungs every 6 hours as needed for shortness of breath / dyspnea or wheezing 1 Inhaler 1     hydrocortisone (ANUSOL-HC) 25 MG Suppository  "Place 1 suppository (25 mg) rectally 2 times daily 28 suppository 0     omeprazole (PRILOSEC) 40 MG capsule Take 1 capsule (40 mg) by mouth daily Take 30-60 minutes before a meal. 90 capsule 3     fluocinolone acetonide 0.01 % OIL Place 5 drops in ear(s) 2 times daily 20 mL 1     levothyroxine (SYNTHROID, LEVOTHROID) 200 MCG tablet Take 1 tablet (200 mcg) by mouth daily 90 tablet 2     OTC products: None, except as noted above    Allergies   Allergen Reactions     Chantix [Varenicline]      Stomach cramps      Eryc [Erythromycin]      dry heaves  nausea      Latex Allergy: NO    Social History   Substance Use Topics     Smoking status: Current Every Day Smoker     Packs/day: 1.00     Years: 15.00     Types: Cigarettes     Smokeless tobacco: Never Used      Comment: We are attempting again to quit 3/2017     Alcohol use 0.0 oz/week     0 Standard drinks or equivalent per week      Comment: occasionally     History   Drug Use No       REVIEW OF SYSTEMS:                                                    Constitutional, neuro, ENT, endocrine, pulmonary, cardiac, gastrointestinal, genitourinary, musculoskeletal, integument and psychiatric systems are negative, except as otherwise noted.    EXAM:                                                    /80 (BP Location: Right arm, Cuff Size: Adult Regular)  Pulse 80  Temp 98.9  F (37.2  C) (Oral)  Ht 5' 5\" (1.651 m)  Wt 174 lb 2 oz (79 kg)  LMP 03/17/2017  BMI 28.98 kg/m2    GENERAL APPEARANCE: healthy, alert and no distress     EYES: EOMI, PERRL     HENT: ear canals and TM's normal and nose and mouth without ulcers or lesions     NECK: no adenopathy, no asymmetry, masses, or scars and thyroid normal to palpation     RESP: lungs clear to auscultation - no rales, rhonchi or wheezes     CV: regular rates and rhythm, normal S1 S2, no S3 or S4 and no murmur, click or rub     ABDOMEN:  soft, nontender, no HSM or masses and bowel sounds normal     MS: extremities normal- no " gross deformities noted, no evidence of inflammation in joints, FROM in all extremities.     SKIN: no suspicious lesions or rashes     NEURO: Normal strength and tone, sensory exam grossly normal, mentation intact and speech normal     PSYCH: mentation appears normal. and affect normal/bright     LYMPHATICS: No axillary, cervical, or supraclavicular nodes    DIAGNOSTICS:                                                        Recent Labs   Lab Test  01/18/17   1017  03/03/15   1642  06/20/14   0725   HGB  15.7  14.0  15.3   PLT   --   296  282   NA   --    --   138   POTASSIUM   --    --   4.1   CR   --    --   0.80        IMPRESSION:                                                    Reason for surgery/procedure: D and C to treat menometrorrhagia   Diagnosis/reason for consult: Anesthesia Clearance Consult Visit     The proposed surgical procedure is considered LOW risk.    REVISED CARDIAC RISK INDEX  The patient has the following serious cardiovascular risks for perioperative complications such as (MI, PE, VFib and 3  AV Block):  No serious cardiac risks  INTERPRETATION: 0 risks: Class I (very low risk - 0.4% complication rate)    The patient has the following additional risks for perioperative complications:  No identified additional risks      ICD-10-CM    1. Preop general physical exam Z01.818    2. Abnormal uterine bleeding N93.9    3. Bilateral calf pain M79.661 US MAMADOU Doppler No Exercise    M79.662    4. Mild intermittent asthma without complication J45.20    5. Umbilical hernia without obstruction and without gangrene K42.9 GENERAL SURG ADULT REFERRAL   6. Visit for screening mammogram Z12.31 MA SCREENING DIGITAL BILAT - Future  (s+30)       RECOMMENDATIONS:                                                        APPROVAL GIVEN to proceed with proposed procedure, without further diagnostic evaluation       Signed Electronically by: ADELAIDE ULLOA PA-C    Copy of this evaluation report is provided to  requesting physician.    [place] Preop Guidelines

## 2017-03-24 ENCOUNTER — OFFICE VISIT (OUTPATIENT)
Dept: SURGERY | Facility: CLINIC | Age: 47
End: 2017-03-24
Payer: COMMERCIAL

## 2017-03-24 VITALS
HEART RATE: 96 BPM | SYSTOLIC BLOOD PRESSURE: 126 MMHG | BODY MASS INDEX: 29.53 KG/M2 | DIASTOLIC BLOOD PRESSURE: 75 MMHG | HEIGHT: 64 IN | WEIGHT: 173 LBS

## 2017-03-24 DIAGNOSIS — K42.9 UMBILICAL HERNIA WITHOUT OBSTRUCTION AND WITHOUT GANGRENE: Primary | ICD-10-CM

## 2017-03-24 PROCEDURE — 99244 OFF/OP CNSLTJ NEW/EST MOD 40: CPT | Performed by: SURGERY

## 2017-03-24 NOTE — MR AVS SNAPSHOT
After Visit Summary   3/24/2017    Carol Ann Anguiano    MRN: [id number]           Patient Information     Date Of Birth          1970        Visit Information        Provider Department      3/24/2017 7:30 AM Galileo Vogt MD [facility]        Today's Diagnoses     Umbilical hernia without obstruction and without gangrene    -  1       Follow-ups after your visit        Your next 10 appointments already scheduled     Apr 18, 2017  3:00 PM CDT   Post Op with Jaiver Huynh MD   [facility] ([address] [address]   [phone]              Who to contact     If you have questions or need follow up information about today's clinic visit or your schedule please contact [facility] directly at [phone].  Normal or non-critical lab and imaging results will be communicated to you by MyChart, letter or phone within 4 business days after the clinic has received the results. If you do not hear from us within 7 days, please contact the clinic through MyChart or phone. If you have a critical or abnormal lab result, we will notify you by phone as soon as possible.  Submit refill requests through [employer] or call your pharmacy and they will forward the refill request to us. Please allow 3 business days for your refill to be completed.          Additional Information About Your Visit        MyChart Information     [employer] gives you secure access to your electronic health record. If you see a primary care provider, you can also send messages to your care team and make appointments. If you have questions, please call your primary care clinic.  If you do not have a primary care provider, please call [phone] and they will assist you.        Care EveryWhere ID     This is your Care EveryWhere ID. This could be used by other organizations to access your [facility] medical records  [id number]       "  Your Vitals Were     Pulse Height Last Period BMI (Body Mass Index)          96 1.626 m (5' 4\") 03/17/2017 29.7 kg/m2         Blood Pressure from Last 3 Encounters:   03/24/17 126/75   03/20/17 118/80   02/20/17 120/80    Weight from Last 3 Encounters:   03/24/17 78.5 kg (173 lb)   03/20/17 79 kg (174 lb 2 oz)   02/20/17 77.6 kg (171 lb)              Today, you had the following     No orders found for display       Primary Care Provider Office Phone # Fax #    Roby Charles PA-C [phone]       [address] [address]        Thank you!     Thank you for choosing [facility]3] [facility]  for your care. Our goal is always to provide you with excellent care. Hearing back from our patients is one way we can continue to improve our services. Please take a few minutes to complete the written survey that you may receive in the mail after your visit with us. Thank you!             Your Updated Medication List - Protect others around you: Learn how to safely use, store and throw away your medicines at www.disposemymeds.org.          This list is accurate as of: 3/24/17  7:55 AM.  Always use your most recent med list.                   Brand Name Dispense Instructions for use    albuterol 108 (90 BASE) MCG/ACT Inhaler    PROAIR HFA/PROVENTIL HFA/VENTOLIN HFA    1 Inhaler    Inhale 2 puffs into the lungs every 6 hours as needed for shortness of breath / dyspnea or wheezing       fluocinolone acetonide 0.01 % Oil     20 mL    Place 5 drops in ear(s) 2 times daily       hydrocortisone 25 MG Suppository    ANUSOL-HC    28 suppository    Place 1 suppository (25 mg) rectally 2 times daily       levothyroxine 200 MCG tablet    SYNTHROID/LEVOTHROID    90 tablet    Take 1 tablet (200 mcg) by mouth daily       omeprazole 40 MG capsule    priLOSEC    90 capsule    Take 1 capsule (40 mg) by mouth daily Take 30-60 minutes before a meal.         "

## 2017-03-24 NOTE — PROGRESS NOTES
Patient seen in consultation for umbilical hernia by Roby Charles    HPI:  Patient is a 46 year old female  with complaints of umbilical pain  The patient noticed the symptoms about years ago.    More recently it is more bothersome. Prior to that it was more infrequent  Mostly gets this shooting, electrical type pain with getting up from sitting.  Has not really noticed a lump  Avoiding things that cause the pain and time makes the episode better.  Pressure of clothing there will bring on the pain as well, or certain stretching  Patient has not family history of hernia problems    Review Of Systems    Skin: negative  Ears/Nose/Throat: negative  Respiratory: No shortness of breath, dyspnea on exertion, cough, or hemoptysis and smokes 1 pack per day.  Cardiovascular: negative  Gastrointestinal: negative  Genitourinary: no urinary complaints. Has uterine polyp getting removed next week  Musculoskeletal: negative  Neurologic: negative  Hematologic/Lymphatic/Immunologic: negative  Endocrine: negative      Past Medical History:   Diagnosis Date     Arthritis     great aunt     ASTHMA - MILD INTERMITTENT 6/11/2007     Cancer (H)     grandfather     Coronary artery disease     Grandfather and all 3 uncles have had heart attacks     Depressive disorder     myself and my mother     Family history of cancer     grandfather     Hypertension     Mother and grandmother     Unspecified hypothyroidism        Past Surgical History:   Procedure Laterality Date     CHOLECYSTECTOMY, LAPOROSCOPIC  2008    Cholecystectomy, Laparoscopic     LASIK BILATERAL         Social History     Social History     Marital status:      Spouse name: Saul     Number of children: 2     Years of education: 14     Occupational History      [employer]     Social History Main Topics     Smoking status: Current Every Day Smoker     Packs/day: 1.00     Years: 15.00     Types: Cigarettes     Smokeless tobacco: Never  "Used      Comment: We are attempting again to quit 3/2017     Alcohol use 0.0 oz/week     0 Standard drinks or equivalent per week      Comment: occasionally     Drug use: No     Sexual activity: Yes     Partners: Male     Birth control/ protection: None      Comment: my  has a vasectomy     Other Topics Concern     Parent/Sibling W/ Cabg, Mi Or Angioplasty Before 65f 55m? No     Social History Narrative    Dairy/d 3-4 servings/d.     Caffeine 1 servings/d    Exercise 0 x week very active    Sunscreen used - Yes    Seatbelts used - Yes    Working smoke/CO detectors in the home - Yes    Guns stored in the home - No    Self Breast Exams - Yes    Self Testicular Exam - NA    Eye Exam up to date - Due for an appointment.     Dental Exam up to date - Due for an appointment.     Pap Smear up to date - Yes    Mammogram up to date - n/a    PSA up to date - NA    Dexa Scan up to date - NA    Flex Sig / Colonoscopy up to date - NA    Immunizations up to date - Yes    Abuse: Current or Past(Physical, Sexual or Emotional)- No    Do you feel safe in your environment - Yes    Saul Blackman MA    9/21/07           Current Outpatient Prescriptions   Medication Sig Dispense Refill     albuterol (PROAIR HFA/PROVENTIL HFA/VENTOLIN HFA) 108 (90 BASE) MCG/ACT Inhaler Inhale 2 puffs into the lungs every 6 hours as needed for shortness of breath / dyspnea or wheezing 1 Inhaler 1     omeprazole (PRILOSEC) 40 MG capsule Take 1 capsule (40 mg) by mouth daily Take 30-60 minutes before a meal. 90 capsule 3     levothyroxine (SYNTHROID, LEVOTHROID) 200 MCG tablet Take 1 tablet (200 mcg) by mouth daily 90 tablet 2     hydrocortisone (ANUSOL-HC) 25 MG Suppository Place 1 suppository (25 mg) rectally 2 times daily 28 suppository 0     fluocinolone acetonide 0.01 % OIL Place 5 drops in ear(s) 2 times daily 20 mL 1       Medications and history reviewed    Physical exam:  Vitals: /75  Pulse 96  Ht 1.626 m (5' 4\")  Wt 78.5 kg (173 lb) "  LMP 03/17/2017  BMI 29.7 kg/m2  BMI= Body mass index is 29.7 kg/(m^2).    Constitutional: healthy, alert and no distress  Head: Normocephalic. No masses, lesions, tenderness or abnormalities  Cardiovascular: negative, PMI normal. No lifts, heaves, or thrills. RRR. No murmurs, clicks gallops or rub  Respiratory: negative, Percussion normal. Good diaphragmatic excursion. Lungs clear  Gastrointestinal: Abdomen soft, non-tender. BS normal. No masses, organomegaly, positive findings: umbilical hernia- very small, noticed with tensing abdomen muscles. Tender to direct palpation  : Deferred  Musculoskeletal: extremities normal- no gross deformities noted, gait normal and normal muscle tone  Skin: no suspicious lesions or rashes  Psychiatric: mentation appears normal and affect normal/bright  Hematologic/Lymphatic/Immunologic: Normal cervical lymph nodes  Patient able to get up on table without difficulty.    Assessment:     ICD-10-CM    1. Umbilical hernia without obstruction and without gangrene K42.9      Plan: Small umbilical hernia, may be related to prior lap debbie incision. If ends up being small may just do primary repair otherwise did discuss use of mesh for more durable repair. Will schedule  Risks of surgery discussed including, but not limited to bleeding, infection, recurrence, damage to nerves and what is in the hernia sac.  Risks of anesthesia also discussed..  Although mesh is a better long term repair if it gets infected it must be removed.  If there is evidence of an infection at time of surgery it will be cancelled and rescheduled to when well.  If the patient is a smoker I did discuss increase risk of recurrence and more pain with the cough.  If the patient is willing to quit smoking would encourage to do so and start at least a week before surgery.  However, if patient is not going to quit then must understand that his repair is more likely to fail.  Discussed massaging hernia back in and using ice  if becomes more painful.  If not able to reduce then go to emergency room.      Galileo Vogt MD

## 2017-03-24 NOTE — NURSING NOTE
"Chief Complaint   Patient presents with     Hernia       Initial /75  Pulse 96  Ht 5' 4\" (1.626 m)  Wt 173 lb (78.5 kg)  LMP 03/17/2017  BMI 29.7 kg/m2 Estimated body mass index is 29.7 kg/(m^2) as calculated from the following:    Height as of this encounter: 5' 4\" (1.626 m).    Weight as of this encounter: 173 lb (78.5 kg).  Medication Reconciliation: complete   Mary Frazier Cma      "

## 2017-03-28 ENCOUNTER — TELEPHONE (OUTPATIENT)
Dept: OBGYN | Facility: CLINIC | Age: 47
End: 2017-03-28

## 2017-03-28 ENCOUNTER — OFFICE VISIT (OUTPATIENT)
Dept: FAMILY MEDICINE | Facility: CLINIC | Age: 47
End: 2017-03-28
Payer: COMMERCIAL

## 2017-03-28 VITALS
WEIGHT: 171 LBS | TEMPERATURE: 98.5 F | OXYGEN SATURATION: 98 % | DIASTOLIC BLOOD PRESSURE: 74 MMHG | HEART RATE: 104 BPM | RESPIRATION RATE: 16 BRPM | BODY MASS INDEX: 29.19 KG/M2 | SYSTOLIC BLOOD PRESSURE: 100 MMHG | HEIGHT: 64 IN

## 2017-03-28 DIAGNOSIS — J45.20 ASTHMA, MILD INTERMITTENT, WELL-CONTROLLED: ICD-10-CM

## 2017-03-28 DIAGNOSIS — J10.1 INFLUENZA B: Primary | ICD-10-CM

## 2017-03-28 LAB
FLUAV+FLUBV AG SPEC QL: ABNORMAL
FLUAV+FLUBV AG SPEC QL: NEGATIVE
SPECIMEN SOURCE: ABNORMAL

## 2017-03-28 PROCEDURE — 87804 INFLUENZA ASSAY W/OPTIC: CPT | Performed by: FAMILY MEDICINE

## 2017-03-28 PROCEDURE — 99213 OFFICE O/P EST LOW 20 MIN: CPT | Performed by: FAMILY MEDICINE

## 2017-03-28 RX ORDER — OSELTAMIVIR PHOSPHATE 75 MG/1
75 CAPSULE ORAL 2 TIMES DAILY
Qty: 10 CAPSULE | Refills: 0 | Status: SHIPPED | OUTPATIENT
Start: 2017-03-28 | End: 2017-04-02

## 2017-03-28 NOTE — TELEPHONE ENCOUNTER
Called pt to discuss rescheduling her surgery, got voice mail left message to call the clinic back.  Zulema Serrano, CMA

## 2017-03-28 NOTE — PATIENT INSTRUCTIONS
[facility]    If you have any questions regarding to your visit please contact your care team:       Team Red:   Clinic Hours Telephone Number   Dr. Kasia Nichols  (pediatrics)  Keiko Tenorio NP 7am-7pm  Monday - Thursday   7am-5pm  Fridays  [phone] (fax)    Joselo MORGAN  [phone]   Urgent Care - [place] and [place] Monday-Friday  [place] - 11am-8pm  Saturday-Sunday  Both sites - 9am-5pm  [phone] - [facility]  [phone] - [place]       What options do I have for visits at the clinic other than the traditional office visit?  To expand how we care for you, many of our providers are utilizing electronic visits (e-visits) and telephone visits, when medically appropriate, for interactions with their patients rather than a visit in the clinic.   We also offer nurse visits for many medical concerns. Just like any other service, we will bill your insurance company for this type of visit based on time spent on the phone with your provider. Not all insurance companies cover these visits. Please check with your medical insurance if this type of visit is covered. You will be responsible for any charges that are not paid by your insurance.      E-visits via [employer]:  generally incur a $35.00 fee.  Telephone visits:  Time spent on the phone: *charged based on time that is spent on the phone in increments of 10 minutes. Estimated cost:   5-10 mins $30.00   11-20 mins. $59.00   21-30 mins. $85.00     As always, Thank you for trusting us with your health care needs!              Influenza (Adult)    Influenza is also called the flu. It is a viral illness that affects the air passages of your lungs. It is different from the common cold. The flu can easily be passed from one to person to another. It may be spread through the air by coughing and sneezing. Or it can be spread by touching the sick person and then touching your own eyes,  nose, or mouth.  The flu starts 1 to 3 days after you are exposed to the flu virus. It may last for 1 to 2 weeks. You usually don t need to take antibiotics unless you have a complication. This might be an ear or sinus infection or pneumonia.  Symptoms of the flu may be mild or severe. They can include extreme tiredness (wanting to stay in bed all day), chills, fevers, muscle aches, soreness with eye movement, headache, and a dry, hacking cough.  Home care  Follow these guidelines when caring for yourself at home:    Avoid being around cigarette smoke, whether yours or other people s.    Acetaminophen or ibuprofen will help ease your fever, muscle aches, and headache. Don t give aspirin to anyone younger than 18 who has the flu. Aspirin can harm the liver.    Nausea and loss of appetite are common with the flu. Eat light meals. Drink 6 to 8 glasses of liquids every day. Good choices are water, sport drinks, soft drinks without caffeine, juices, tea, and soup. Extra fluids will also help loosen secretions in your nose and lungs.    Over-the-counter cold medicines will not make the flu go away faster. But the medicines may help with coughing, sore throat, and congestion in your nose and sinuses. Don t use a decongestant if you have high blood pressure.    Stay home until your fever has been gone for at least 24 hours without using medicine to reduce fever.  Follow-up care  Follow up with your health care provider, or as advised, if you are not getting better over the next week.  If you are 65 or older, talk with your provider about getting a pneumococcal vaccine every 5 years. You should also get this vaccine if you have chronic asthma or COPD. All adults should get a flu vaccine every fall. Ask your provider about this.  When to seek medical advice  Call your health care provider right away if any of these occur:    Cough with lots of colored sputum (mucus) or blood in your sputum    Chest pain, shortness of breath,  wheezing, or difficulty breathing    Severe headache, or face, neck, or ear pain    New rash with fever    Fever of 101 F (38 C) oral or higher that doesn t get better with fever medicine    Confusion, behavior change, or seizure    Severe weakness or dizziness    You get a fever or cough after getting better for a few days       2080-7926 [employer]. [address] [place] [address]. All rights reserved. This information is not intended as a substitute for professional medical care. Always follow your healthcare professional's instructions.        Patient Education    Oseltamivir Phosphate Oral capsule    Oseltamivir Phosphate Oral suspension  Oseltamivir Phosphate Oral capsule  What is this medicine?  OSELTAMIVIR (os el CHAMPION i vir) is an antiviral medicine. It is used to prevent and to treat some kinds of influenza or the flu. It will not work for colds or other viral infections.  This medicine may be used for other purposes; ask your health care provider or pharmacist if you have questions.  What should I tell my health care provider before I take this medicine?  They need to know if you have any of the following conditions:    heart disease    immune system problems    kidney disease    liver disease    lung disease    an unusual or allergic reaction to oseltamivir, other medicines, foods, dyes, or preservatives    pregnant or trying to get pregnant    breast-feeding  How should I use this medicine?  Take this medicine by mouth with a glass of water. Follow the directions on the prescription label. Start this medicine at the first sign of flu symptoms. You can take it with or without food. If it upsets your stomach, take it with food. Take your medicine at regular intervals. Do not take your medicine more often than directed. Take all of your medicine as directed even if you think you are better. Do not skip doses or stop your medicine early.  Talk to your pediatrician regarding the use of  this medicine in children. While this drug may be prescribed for children as young as 14 days for selected conditions, precautions do apply.  Overdosage: If you think you have taken too much of this medicine contact a poison control center or emergency room at once.  NOTE: This medicine is only for you. Do not share this medicine with others.  What if I miss a dose?  If you miss a dose, take it as soon as you remember. If it is almost time for your next dose (within 2 hours), take only that dose. Do not take double or extra doses.  What may interact with this medicine?  Interactions are not expected.  This list may not describe all possible interactions. Give your health care provider a list of all the medicines, herbs, non-prescription drugs, or dietary supplements you use. Also tell them if you smoke, drink alcohol, or use illegal drugs. Some items may interact with your medicine.  What should I watch for while using this medicine?  Visit your doctor or health care professional for regular check ups. Tell your doctor if your symptoms do not start to get better or if they get worse.  If you have the flu, you may be at an increased risk of developing seizures, confusion, or abnormal behavior. This occurs early in the illness, and more frequently in children and teens. These events are not common, but may result in accidental injury to the patient. Families and caregivers of patients should watch for signs of unusual behavior and contact a doctor or health care professional right away if the patient shows signs of unusual behavior.  This medicine is not a substitute for the flu shot. Talk to your doctor each year about an annual flu shot.  What side effects may I notice from receiving this medicine?  Side effects that you should report to your doctor or health care professional as soon as possible:    allergic reactions like skin rash, itching or hives, swelling of the face, lips, or tongue    anxiety, confusion,  unusual behavior    breathing problems    hallucination, loss of contact with reality    redness, blistering, peeling or loosening of the skin, including inside the mouth    seizures  Side effects that usually do not require medical attention (report to your doctor or health care professional if they continue or are bothersome):    cough    diarrhea    dizziness    headache    nausea, vomiting    stomach pain  This list may not describe all possible side effects. Call your doctor for medical advice about side effects. You may report side effects to FDA at 4-237-FZF-9932.  Where should I keep my medicine?  Keep out of the reach of children.  Store at room temperature between 15 and 30 degrees C (59 and 86 degrees F). Throw away any unused medicine after the expiration date.  NOTE:This sheet is a summary. It may not cover all possible information. If you have questions about this medicine, talk to your doctor, pharmacist, or health care provider. Copyright  2016 [employer]      Discharged by Chata Arroyo MA.

## 2017-03-28 NOTE — MR AVS SNAPSHOT
After Visit Summary   3/28/2017    Carol Ann Anguiano    MRN: [id number]           Patient Information     Date Of Birth          1970        Visit Information        Provider Department      3/28/2017 11:40 AM Kasia Hunter MD [facility]        Today's Diagnoses     Influenza B    -  1    Asthma, mild intermittent, well-controlled          Care Instructions    [facility]    If you have any questions regarding to your visit please contact your care team:       Team Red:   Clinic Hours Telephone Number   Dr. Kasia Nichols  (pediatrics)  Keiko Tenorio NP 7am-7pm  Monday - Thursday   7am-5pm  Fridays  [phone] (fax)    Joselo MORGAN  [phone]   Urgent Care - [place] and [place] Monday-Friday  [place] - 11am-8pm  Saturday-Sunday  Both sites - 9am-5pm  [phone] - [facility]  [phone] - [place]       What options do I have for visits at the clinic other than the traditional office visit?  To expand how we care for you, many of our providers are utilizing electronic visits (e-visits) and telephone visits, when medically appropriate, for interactions with their patients rather than a visit in the clinic.   We also offer nurse visits for many medical concerns. Just like any other service, we will bill your insurance company for this type of visit based on time spent on the phone with your provider. Not all insurance companies cover these visits. Please check with your medical insurance if this type of visit is covered. You will be responsible for any charges that are not paid by your insurance.      E-visits via [employer]  generally incur a $35.00 fee.  Telephone visits:  Time spent on the phone: *charged based on time that is spent on the phone in increments of 10 minutes. Estimated cost:   5-10 mins $30.00   11-20 mins. $59.00   21-30 mins. $85.00     As always, Thank you for trusting us with  your health care needs!              Influenza (Adult)    Influenza is also called the flu. It is a viral illness that affects the air passages of your lungs. It is different from the common cold. The flu can easily be passed from one to person to another. It may be spread through the air by coughing and sneezing. Or it can be spread by touching the sick person and then touching your own eyes, nose, or mouth.  The flu starts 1 to 3 days after you are exposed to the flu virus. It may last for 1 to 2 weeks. You usually don t need to take antibiotics unless you have a complication. This might be an ear or sinus infection or pneumonia.  Symptoms of the flu may be mild or severe. They can include extreme tiredness (wanting to stay in bed all day), chills, fevers, muscle aches, soreness with eye movement, headache, and a dry, hacking cough.  Home care  Follow these guidelines when caring for yourself at home:    Avoid being around cigarette smoke, whether yours or other people s.    Acetaminophen or ibuprofen will help ease your fever, muscle aches, and headache. Don t give aspirin to anyone younger than 18 who has the flu. Aspirin can harm the liver.    Nausea and loss of appetite are common with the flu. Eat light meals. Drink 6 to 8 glasses of liquids every day. Good choices are water, sport drinks, soft drinks without caffeine, juices, tea, and soup. Extra fluids will also help loosen secretions in your nose and lungs.    Over-the-counter cold medicines will not make the flu go away faster. But the medicines may help with coughing, sore throat, and congestion in your nose and sinuses. Don t use a decongestant if you have high blood pressure.    Stay home until your fever has been gone for at least 24 hours without using medicine to reduce fever.  Follow-up care  Follow up with your health care provider, or as advised, if you are not getting better over the next week.  If you are 65 or older, talk with your provider  about getting a pneumococcal vaccine every 5 years. You should also get this vaccine if you have chronic asthma or COPD. All adults should get a flu vaccine every fall. Ask your provider about this.  When to seek medical advice  Call your health care provider right away if any of these occur:    Cough with lots of colored sputum (mucus) or blood in your sputum    Chest pain, shortness of breath, wheezing, or difficulty breathing    Severe headache, or face, neck, or ear pain    New rash with fever    Fever of 101 F (38 C) oral or higher that doesn t get better with fever medicine    Confusion, behavior change, or seizure    Severe weakness or dizziness    You get a fever or cough after getting better for a few days       5319-1337 [employer]. [address] [place] [address], [place] [id number]. All rights reserved. This information is not intended as a substitute for professional medical care. Always follow your healthcare professional's instructions.        Patient Education    Oseltamivir Phosphate Oral capsule    Oseltamivir Phosphate Oral suspension  Oseltamivir Phosphate Oral capsule  What is this medicine?  OSELTAMIVIR (os el CHAMPION i vir) is an antiviral medicine. It is used to prevent and to treat some kinds of influenza or the flu. It will not work for colds or other viral infections.  This medicine may be used for other purposes; ask your health care provider or pharmacist if you have questions.  What should I tell my health care provider before I take this medicine?  They need to know if you have any of the following conditions:    heart disease    immune system problems    kidney disease    liver disease    lung disease    an unusual or allergic reaction to oseltamivir, other medicines, foods, dyes, or preservatives    pregnant or trying to get pregnant    breast-feeding  How should I use this medicine?  Take this medicine by mouth with a glass of water. Follow the directions on the prescription label.  Start this medicine at the first sign of flu symptoms. You can take it with or without food. If it upsets your stomach, take it with food. Take your medicine at regular intervals. Do not take your medicine more often than directed. Take all of your medicine as directed even if you think you are better. Do not skip doses or stop your medicine early.  Talk to your pediatrician regarding the use of this medicine in children. While this drug may be prescribed for children as young as 14 days for selected conditions, precautions do apply.  Overdosage: If you think you have taken too much of this medicine contact a poison control center or emergency room at once.  NOTE: This medicine is only for you. Do not share this medicine with others.  What if I miss a dose?  If you miss a dose, take it as soon as you remember. If it is almost time for your next dose (within 2 hours), take only that dose. Do not take double or extra doses.  What may interact with this medicine?  Interactions are not expected.  This list may not describe all possible interactions. Give your health care provider a list of all the medicines, herbs, non-prescription drugs, or dietary supplements you use. Also tell them if you smoke, drink alcohol, or use illegal drugs. Some items may interact with your medicine.  What should I watch for while using this medicine?  Visit your doctor or health care professional for regular check ups. Tell your doctor if your symptoms do not start to get better or if they get worse.  If you have the flu, you may be at an increased risk of developing seizures, confusion, or abnormal behavior. This occurs early in the illness, and more frequently in children and teens. These events are not common, but may result in accidental injury to the patient. Families and caregivers of patients should watch for signs of unusual behavior and contact a doctor or health care professional right away if the patient shows signs of unusual  behavior.  This medicine is not a substitute for the flu shot. Talk to your doctor each year about an annual flu shot.  What side effects may I notice from receiving this medicine?  Side effects that you should report to your doctor or health care professional as soon as possible:    allergic reactions like skin rash, itching or hives, swelling of the face, lips, or tongue    anxiety, confusion, unusual behavior    breathing problems    hallucination, loss of contact with reality    redness, blistering, peeling or loosening of the skin, including inside the mouth    seizures  Side effects that usually do not require medical attention (report to your doctor or health care professional if they continue or are bothersome):    cough    diarrhea    dizziness    headache    nausea, vomiting    stomach pain  This list may not describe all possible side effects. Call your doctor for medical advice about side effects. You may report side effects to FDA at 3-124-FDA-3292.  Where should I keep my medicine?  Keep out of the reach of children.  Store at room temperature between 15 and 30 degrees C (59 and 86 degrees F). Throw away any unused medicine after the expiration date.  NOTE:This sheet is a summary. It may not cover all possible information. If you have questions about this medicine, talk to your doctor, pharmacist, or health care provider. Copyright  2016 [employer] Standard      Discharged by Chata Arroyo MA.          Follow-ups after your visit        Follow-up notes from your care team     Return in about 1 month (around 4/28/2017), or if symptoms worsen or fail to improve, for asthma.      Your next 10 appointments already scheduled     Apr 18, 2017  3:00 PM CDT   Post Op with Javier Huynh MD   [facility] ([address]  [place] [address]   [phone]              Who to contact     If you have questions or need follow up information about today's clinic  "visit or your schedule please contact [facility] [place] directly at [phone].  Normal or non-critical lab and imaging results will be communicated to you by Alintohart, letter or phone within 4 business days after the clinic has received the results. If you do not hear from us within 7 days, please contact the clinic through Safendt or phone. If you have a critical or abnormal lab result, we will notify you by phone as soon as possible.  Submit refill requests through [email or web address] or call your pharmacy and they will forward the refill request to us. Please allow 3 business days for your refill to be completed.          Additional Information About Your Visit        [employer] Information     [email or web address] gives you secure access to your electronic health record. If you see a primary care provider, you can also send messages to your care team and make appointments. If you have questions, please call your primary care clinic.  If you do not have a primary care provider, please call [phone] and they will assist you.        Care EveryWhere ID     This is your Care EveryWhere ID. This could be used by other organizations to access your [facility] medical records  [id number]        Your Vitals Were     Pulse Temperature Respirations Height Last Period Pulse Oximetry    104 98.5  F (36.9  C) 16 5' 4\" (1.626 m) 03/17/2017 98%    Breastfeeding? BMI (Body Mass Index)                Unknown 29.35 kg/m2           Blood Pressure from Last 3 Encounters:   03/28/17 100/74   03/24/17 126/75   03/20/17 118/80    Weight from Last 3 Encounters:   03/28/17 171 lb (77.6 kg)   03/24/17 173 lb (78.5 kg)   03/20/17 174 lb 2 oz (79 kg)              We Performed the Following     Influenza A/B antigen          Today's Medication Changes          These changes are accurate as of: 3/28/17 12:37 PM.  If you have any questions, ask your nurse or doctor.               Start taking these medicines.        Dose/Directions    oseltamivir 75 MG " capsule   Commonly known as:  TAMIFLU   Used for:  Influenza B   Started by:  Kasia Hunter MD        Dose:  75 mg   Take 1 capsule (75 mg) by mouth 2 times daily for 5 days   Quantity:  10 capsule   Refills:  0            Where to get your medicines      These medications were sent to [id number] - [place] - [address] [facility] [address] [place]  [address] [facility], [place] [address]     Phone:  [phone]     oseltamivir 75 MG capsule                Primary Care Provider Office Phone # Fax #    Roby Charles PA-C [phone]       [address] [address]        Thank you!     Thank you for choosing [facility]  for your care. Our goal is always to provide you with excellent care. Hearing back from our patients is one way we can continue to improve our services. Please take a few minutes to complete the written survey that you may receive in the mail after your visit with us. Thank you!             Your Updated Medication List - Protect others around you: Learn how to safely use, store and throw away your medicines at www.disposemymeds.org.          This list is accurate as of: 3/28/17 12:37 PM.  Always use your most recent med list.                   Brand Name Dispense Instructions for use    albuterol 108 (90 BASE) MCG/ACT Inhaler    PROAIR HFA/PROVENTIL HFA/VENTOLIN HFA    1 Inhaler    Inhale 2 puffs into the lungs every 6 hours as needed for shortness of breath / dyspnea or wheezing       levothyroxine 200 MCG tablet    SYNTHROID/LEVOTHROID    90 tablet    Take 1 tablet (200 mcg) by mouth daily       omeprazole 40 MG capsule    priLOSEC    90 capsule    Take 1 capsule (40 mg) by mouth daily Take 30-60 minutes before a meal.       oseltamivir 75 MG capsule    TAMIFLU    10 capsule    Take 1 capsule (75 mg) by mouth 2 times daily for 5 days

## 2017-03-28 NOTE — PROGRESS NOTES
"  SUBJECTIVE:                                                    Carol Ann Anguiano is a 46 year old female who presents to clinic today for the following health issues:    Acute Illness   Acute illness concerns: cough  Onset: 3 day    Fever: no     Chills/Sweats: YES    Headache (location?): YES    Sinus Pressure:YES    Conjunctivitis:  no    Ear Pain: YES- left    Rhinorrhea: YES    Congestion: YES    Sore Throat: YES     Cough: YES    Wheeze: YES    Decreased Appetite: YES    Nausea: no    Vomiting: no    Diarrhea:  no    Dysuria/Freq.: no    Fatigue/Achiness: YES    Sick/Strep Exposure: YES     Therapies Tried and outcome: inhaler    ROS:  C: NEGATIVE for fever, chills, change in weight  I: NEGATIVE for worrisome rashes, moles or lesions  E: NEGATIVE for vision changes or irritation  ENT/MOUTH: see HPI   RESP: as above  CV: NEGATIVE for chest pain/chest pressure    OBJECTIVE:                                                    /74  Pulse 104  Temp 98.5  F (36.9  C)  Resp 16  Ht 5' 4\" (1.626 m)  Wt 171 lb (77.6 kg)  LMP 03/17/2017  SpO2 98%  Breastfeeding? Unknown  BMI 29.35 kg/m2  Body mass index is 29.35 kg/(m^2).  GENERAL: alert, no distress and ill appearing   EYES: Eyes grossly normal to inspection, PERRL and conjunctivae and sclerae normal  HENT: ear canals and TM's normal, nose and mouth without ulcers or lesions  NECK: no adenopathy, no asymmetry, masses, or scars and thyroid normal to palpation  RESP: lungs clear to auscultation - no rales, rhonchi or wheezes  CV: regular rate and rhythm, normal S1 S2, no S3 or S4, no murmur, click or rub, no peripheral edema and peripheral pulses strong  MS: no gross musculoskeletal defects noted, no edema  PSYCH: mentation appears normal, affect normal/bright    Diagnostic Test Results:  Results for orders placed or performed in visit on 03/28/17   Influenza A/B antigen   Result Value Ref Range    Influenza A/B Agn Specimen Nasal     Influenza A Negative " NEG    Influenza B (A) NEG     Positive   Test results must be correlated with clinical data. If necessary, results   should be confirmed by a molecular assay or viral culture.          ASSESSMENT/PLAN:                                                    (J10.1) Influenza B  (primary encounter diagnosis)  Plan: oseltamivir (TAMIFLU) 75 MG capsule        Postpone surgery until well     (J45.20) Asthma, mild intermittent, well-controlled  Plan: follow-up with PCP when well     See Patient Instructions    Kasia Hunter MD  [facility]

## 2017-03-28 NOTE — NURSING NOTE
"Chief Complaint   Patient presents with     Cough       Initial /74  Pulse 104  Temp 98.5  F (36.9  C)  Resp 16  Ht 5' 4\" (1.626 m)  Wt 171 lb (77.6 kg)  LMP 03/17/2017  SpO2 98%  BMI 29.35 kg/m2 Estimated body mass index is 29.35 kg/(m^2) as calculated from the following:    Height as of this encounter: 5' 4\" (1.626 m).    Weight as of this encounter: 171 lb (77.6 kg).  Medication Reconciliation: complete     Connor Romo. MA      "

## 2017-03-28 NOTE — TELEPHONE ENCOUNTER
Reason for Call:  Cancel surgery    Detailed comments: Patient needs to cancel surgery for Thursday 3- at 7:30. States she has influenza B.    Phone Number Patient can be reached at: Home number on file [phone] (home)    Best Time: any    Can we leave a detailed message on this number? Yes    Call taken on 3/28/2017 at 1:06 PM by Marika Guzmán

## 2017-03-29 ASSESSMENT — ASTHMA QUESTIONNAIRES: ACT_TOTALSCORE: 18

## 2017-04-03 NOTE — TELEPHONE ENCOUNTER
Patient needs to reschedule the hysteroscopy/D&C.  Please advise on which day you would like to do this on.  Mary Alice is out and we don't have access to the surgery schedule.  Pre-op was 3/20/2017.  Is it possible to get this in becore the 30 day window of time?  There is also documentation from the general surgeon who want to do a hernia repair.  Patient saw this provider on 3/24.CHRISTIANO Mario RN

## 2017-04-03 NOTE — TELEPHONE ENCOUNTER
[facility] Call Center    Phone Message    Name of Caller: Carol Ann    Phone Number: Work number on file:  [phone] (work)  Carol Ann requested calling home # after 4pm.     Best time to return call: Any    May a detailed message be left on voicemail: yes    Relation to patient: Self    Reason for Call: Carol Ann called to reschedule her procedure.  She will wait for a call back.  Thank you.     Action Taken: Message routed to:  [facility] p [phone]

## 2017-04-04 NOTE — TELEPHONE ENCOUNTER
Jayde Klein from speciality scheduling wants to set up team with Dr. Huynh and Dr. Vogt (for hernia repair) for the same day surgery.  Zulema Serrano CMA

## 2017-04-04 NOTE — TELEPHONE ENCOUNTER
Called and spoke with patient advising that Zulema SHEPARD talked with Dr Huynh who wants to wait until Thursday to schedule this surgery. Patient states that she is hoping to get this dual surgery done next Thursday 4-13-17. I advised that we would try our best to coordinate the 2 together. She verbalized understanding and will await our call back on Thursday.

## 2017-04-04 NOTE — TELEPHONE ENCOUNTER
"Phone call from Dr. Huynh. He is not in clinic tomorrow due to call schedule. Will review his schedule when he returns to clinic on 04-06-17 to set surgery date. Unsure that coordinating dual surgery can be arranged prior 04-13-17.      Phone call to pt. She stated she is feeling better from Influenza B and then an asthma flare.   Explain staff is still coordinating surgeons' surgical schedules but not sure that surgery can be done prior to pre-op expiring. Pt verbalized understanding and stated Dr. Vogt's staff called her today and stated his schedule is limited too.  Pt stated she would really like to have dual surgery and then only one time off work to recover from one surgery instead of two recoveries. Then she can use her vacation to \"do something fun this summer.\" Pt agreed to wait until for surgery to be coordinated and stated she does not mind having to have another pre-op.   Explained will call back asap with date for surgery. Pt appreciative of update. Annel Zamudio RN, BAN    "

## 2017-04-06 NOTE — TELEPHONE ENCOUNTER
Per dr. Huynh to try for the pm on 4/21/17. LM for surgery scheduler [phone]  Kaycee Perea LPN

## 2017-04-07 ENCOUNTER — TELEPHONE (OUTPATIENT)
Dept: FAMILY MEDICINE | Facility: CLINIC | Age: 47
End: 2017-04-07

## 2017-04-07 NOTE — TELEPHONE ENCOUNTER
Left message for surgery scheduler at [facility] to give me a call.  Melissa Redd, [facility]  April 7, 2017 1:18 PM

## 2017-04-07 NOTE — TELEPHONE ENCOUNTER
Patient was in to see Dr. Hunter on 03-28-17 and failed their ACT by scoring 18. Please put in the failed ACT workflow for follow-up. Thank you.

## 2017-04-07 NOTE — LETTER
[facility]  [address]  [place] [address]  [phone]    April 18, 2017      Carol Ann Anguiano  [address] [address]      Dear Carol Ann,     Your clinic record indicates that you are due for an asthma update. We have a survey tool called an ACT (or Asthma Control Test) we use to measure the level of control of your asthma. Please complete the enclosed questionnaire and mail it back to us in the self-addressed stamped envelope.     If you have questions about this letter please contact your provider.     Sincerely,    Your [facility]

## 2017-04-11 ENCOUNTER — MYC MEDICAL ADVICE (OUTPATIENT)
Dept: OBGYN | Facility: CLINIC | Age: 47
End: 2017-04-11

## 2017-04-11 NOTE — TELEPHONE ENCOUNTER
Surgery Scheduled.     Date of Surgery 4/21/17Time of Surgery 2:15 PM  Procedure: Hysteroscopy/ D&C  Hospital/Surgical Facility: [facility]  Surgeon: Dr. Huynh  Type of Anesthesia Anticipated: Local with MAC  Pre-op: 4/20/17 with Dr. Charles at 2:20  2 week post op: to be scheduled with Dr. Huynh at  [facility]  Pre-certification 4/11/17  Consent signed: NA  Hospital Stay NA         [facility] surgery packet mailed to patient's home address. Patient instructed NPO 12 hours prior to surgery, arrive 1 1/2 hours prior to surgery, must have a . Patient understood and agrees to the plan.     Melissa Redd, [facility]      Surgery Pre-Certification     Medical Record Number: [id number]   Carol Ann Anguiano   YOB: 1970   Phone: [phone] (home) [phone] (work)   Primary Provider: Roby Charles     Reason for Admit:   Abnormal Bleeding Problem/ Endometrial Polyp     Surgeon: MAHNAZ Huynh MD   Surgical Procedure: Hysteroscopy/ D&C   ICD-9 Coded: N93.9/ N84.0   Date of Surgery: 4/21/17   Consent signed? N/A       Hospital: [facility]   Outpatient     Requestor:   Melissa Redd     Location:   [facility]

## 2017-04-11 NOTE — TELEPHONE ENCOUNTER
Left message on machine to call back.  [phone] . Surgery with Dr. Vogt on 4/21/17 at 1:15 and Dr. Huynh will follow at 2:15  Melissa Redd CMA  April 11, 2017 8:25 AM

## 2017-04-20 ENCOUNTER — OFFICE VISIT (OUTPATIENT)
Dept: FAMILY MEDICINE | Facility: CLINIC | Age: 47
End: 2017-04-20
Payer: COMMERCIAL

## 2017-04-20 VITALS
BODY MASS INDEX: 30.05 KG/M2 | HEART RATE: 80 BPM | HEIGHT: 64 IN | DIASTOLIC BLOOD PRESSURE: 72 MMHG | SYSTOLIC BLOOD PRESSURE: 114 MMHG | WEIGHT: 176 LBS | TEMPERATURE: 98.7 F

## 2017-04-20 DIAGNOSIS — J45.20 INTERMITTENT ASTHMA, UNCOMPLICATED: ICD-10-CM

## 2017-04-20 DIAGNOSIS — Z01.818 PREOP GENERAL PHYSICAL EXAM: Primary | ICD-10-CM

## 2017-04-20 DIAGNOSIS — Z12.31 VISIT FOR SCREENING MAMMOGRAM: ICD-10-CM

## 2017-04-20 PROCEDURE — 99214 OFFICE O/P EST MOD 30 MIN: CPT | Performed by: PHYSICIAN ASSISTANT

## 2017-04-20 RX ORDER — ALBUTEROL SULFATE 90 UG/1
2 AEROSOL, METERED RESPIRATORY (INHALATION) EVERY 6 HOURS PRN
Qty: 1 INHALER | Refills: 1 | Status: SHIPPED | OUTPATIENT
Start: 2017-04-20 | End: 2019-02-04

## 2017-04-20 NOTE — MR AVS SNAPSHOT
After Visit Summary   4/20/2017    Carol Ann Anguiano    MRN: [id number]           Patient Information     Date Of Birth          1970        Visit Information        Provider Department      4/20/2017 2:20 PM Roby Charles PA-C [facility]        Today's Diagnoses     Preop general physical exam    -  1    Visit for screening mammogram        Intermittent asthma, uncomplicated          Care Instructions      Before Your Surgery      Call your surgeon if there is any change in your health. This includes signs of a cold or flu (such as a sore throat, runny nose, cough, rash or fever).    Do not smoke, drink alcohol or take over the counter medicine (unless your surgeon or primary care doctor tells you to) for the 24 hours before and after surgery.    If you take prescribed drugs: Follow your doctor s orders about which medicines to take and which to stop until after surgery.    Eating and drinking prior to surgery: follow the instructions from your surgeon    Take a shower or bath the night before surgery. Use the soap your surgeon gave you to gently clean your skin. If you do not have soap from your surgeon, use your regular soap. Do not shave or scrub the surgery site.  Wear clean pajamas and have clean sheets on your bed.         Follow-ups after your visit        Your next 10 appointments already scheduled     May 05, 2017  7:45 AM CDT   [facility]    [address] [place] [address]   [phone]            May 05, 2017  8:15 AM CDT   Post Op with Javier Huynh MD   [facility] ([address] [address]   [phone]              Who to contact     If you have questions or need follow up information about today's clinic visit or your schedule please contact [facility] directly at  "[phone].  Normal or non-critical lab and imaging results will be communicated to you by MyChart, letter or phone within 4 business days after the clinic has received the results. If you do not hear from us within 7 days, please contact the clinic through [employer] or phone. If you have a critical or abnormal lab result, we will notify you by phone as soon as possible.  Submit refill requests through [employer] or call your pharmacy and they will forward the refill request to us. Please allow 3 business days for your refill to be completed.          Additional Information About Your Visit        [employer] Information     [employer] gives you secure access to your electronic health record. If you see a primary care provider, you can also send messages to your care team and make appointments. If you have questions, please call your primary care clinic.  If you do not have a primary care provider, please call [phone] and they will assist you.        Care EveryWhere ID     This is your Care EveryWhere ID. This could be used by other organizations to access your [place] medical records  [id number]        Your Vitals Were     Pulse Temperature Height Last Period BMI (Body Mass Index)       80 98.7  F (37.1  C) (Oral) 5' 4\" (1.626 m) 04/16/2017 (Exact Date) 30.21 kg/m2        Blood Pressure from Last 3 Encounters:   04/20/17 114/72   03/28/17 100/74   03/24/17 126/75    Weight from Last 3 Encounters:   04/20/17 176 lb (79.8 kg)   03/28/17 171 lb (77.6 kg)   03/24/17 173 lb (78.5 kg)              Today, you had the following     No orders found for display         Where to get your medicines      These medications were sent to [id number] - [place] - [address] AT [facility] [place]  [address] [place] [address]     Phone:  [phone]     albuterol 108 (90 BASE) MCG/ACT Inhaler          Primary Care Provider Office Phone # Fax #    Roby Charles PA-C " [phone]       [facility] [address] [address]        Thank you!     Thank you for choosing [facility]  for your care. Our goal is always to provide you with excellent care. Hearing back from our patients is one way we can continue to improve our services. Please take a few minutes to complete the written survey that you may receive in the mail after your visit with us. Thank you!             Your Updated Medication List - Protect others around you: Learn how to safely use, store and throw away your medicines at www.disposemymeds.org.          This list is accurate as of: 4/20/17  3:10 PM.  Always use your most recent med list.                   Brand Name Dispense Instructions for use    albuterol 108 (90 BASE) MCG/ACT Inhaler    PROAIR HFA/PROVENTIL HFA/VENTOLIN HFA    1 Inhaler    Inhale 2 puffs into the lungs every 6 hours as needed for shortness of breath / dyspnea or wheezing       levothyroxine 200 MCG tablet    SYNTHROID/LEVOTHROID    90 tablet    Take 1 tablet (200 mcg) by mouth daily       omeprazole 40 MG capsule    priLOSEC    90 capsule    Take 1 capsule (40 mg) by mouth daily Take 30-60 minutes before a meal.

## 2017-04-20 NOTE — NURSING NOTE
"Chief Complaint   Patient presents with     Pre-Op Exam       Initial /72 (Cuff Size: Adult Regular)  Pulse 80  Temp 98.7  F (37.1  C) (Oral)  Ht 5' 4\" (1.626 m)  Wt 176 lb (79.8 kg)  LMP 04/16/2017 (Exact Date)  BMI 30.21 kg/m2 Estimated body mass index is 30.21 kg/(m^2) as calculated from the following:    Height as of this encounter: 5' 4\" (1.626 m).    Weight as of this encounter: 176 lb (79.8 kg).  Medication Reconciliation: complete   Jayde Falcon, Certified Medical Assistant (AAMA)     "

## 2017-04-20 NOTE — PROGRESS NOTES
[address] [id number] [phone]  Dept: [phone]    PRE-OP EVALUATION:  Today's date: 2017    Carol Ann Anguiano (: 1970) presents for pre-operative evaluation assessment as requested by Dr. Oconnell.  She requires evaluation and anesthesia risk assessment prior to undergoing surgery/procedure for treatment of Hernia and removal Polyp .  Proposed procedure: Hernia repair and polyp removed from uterus    Date of Surgery/ Procedure: 17  Time of Surgery/ Procedure: 1:15pm  Hospital/Surgical Facility:   Fax number for surgical facility:   Primary Physician: Roby Cahrles  Type of Anesthesia Anticipated: to be determined    Patient has a Health Care Directive or Living Will:  NO    1. NO - Do you have a history of heart attack, stroke, stent, bypass or surgery on an artery in the head, neck, heart or legs?  2. YES - DO YOU EVER HAVE ANY PAIN OR DISCOMFORT IN YOUR CHEST? Told it's acid reflux  3. NO - Do you have a history of  Heart Failure?  4. NO - Are you troubled by shortness of breath when: walking on the level, up a slight hill or at night?  5. NO - Do you currently have a cold, bronchitis or other respiratory infection?  6. NO - Do you have a cough, shortness of breath or wheezing?  7. YES - DO YOU SOMETIMES GET PAINS IN THE CALVES OF YOUR LEGS WHEN YOU WALK?   8. YES - DO YOU OR ANYONE IN YOUR FAMILY HAVE PREVIOUS HISTORY OF BLOOD CLOTS? family  9. NO - Do you or does anyone in your family have a serious bleeding problem such as prolonged bleeding following surgeries or cuts?  10. NO - Have you ever had problems with anemia or been told to take iron pills?  11. NO - Have you had any abnormal blood loss such as black, tarry or bloody stools, or abnormal vaginal bleeding?  12. NO - Have you ever had a blood transfusion?  13. YES - HAVE YOU OR ANY OF YOUR RELATIVES EVER HAD PROBLEMS WITH  ANESTHESIA? Gets very sick afterwards  14. NO - Do you have sleep apnea, excessive snoring or daytime drowsiness?  15. NO - Do you have any prosthetic heart valves?  16. NO - Do you have prosthetic joints?  17. NO - Is there any chance that you may be pregnant?      HPI:                                                      Brief HPI related to upcoming procedure: Uterine polyp removal and umbilical hernia     See problem list for active medical problems.  Problems all longstanding and stable, except as noted/documented.  See ROS for pertinent symptoms related to these conditions.                                                                                                  .    MEDICAL HISTORY:                                                      Patient Active Problem List    Diagnosis Date Noted     Abnormal uterine bleeding 02/07/2017     Priority: Medium     Hypothyroidism, unspecified hypothyroidism type 10/09/2015     Priority: Medium     Tobacco use disorder 09/18/2012     Priority: Medium     Globus pharyngeus 05/01/2011     Priority: Medium     CARDIOVASCULAR SCREENING; LDL GOAL LESS THAN 160 05/09/2010     Priority: Medium     Mild intermittent asthma 06/11/2007     Priority: Medium      Past Medical History:   Diagnosis Date     Arthritis     great aunt     ASTHMA - MILD INTERMITTENT 6/11/2007     Cancer (H)     grandfather     Coronary artery disease     Grandfather and all 3 uncles have had heart attacks     Depressive disorder     myself and my mother     Family history of cancer     grandfather     Hypertension     Mother and grandmother     Unspecified hypothyroidism      Past Surgical History:   Procedure Laterality Date     CHOLECYSTECTOMY, LAPOROSCOPIC  2008    Cholecystectomy, Laparoscopic     LASIK BILATERAL       Current Outpatient Prescriptions   Medication Sig Dispense Refill     albuterol (PROAIR HFA/PROVENTIL HFA/VENTOLIN HFA) 108 (90 BASE) MCG/ACT Inhaler Inhale 2 puffs into the lungs  "every 6 hours as needed for shortness of breath / dyspnea or wheezing 1 Inhaler 1     omeprazole (PRILOSEC) 40 MG capsule Take 1 capsule (40 mg) by mouth daily Take 30-60 minutes before a meal. 90 capsule 3     levothyroxine (SYNTHROID, LEVOTHROID) 200 MCG tablet Take 1 tablet (200 mcg) by mouth daily 90 tablet 2     OTC products: None, except as noted above    Allergies   Allergen Reactions     Chantix [Varenicline]      Stomach cramps      Eryc [Erythromycin]      dry heaves  nausea      Latex Allergy: NO    Social History   Substance Use Topics     Smoking status: Former Smoker     Packs/day: 1.00     Years: 15.00     Types: Cigarettes     Smokeless tobacco: Never Used      Comment: We are attempting again to quit 3/2017     Alcohol use 0.0 oz/week     0 Standard drinks or equivalent per week      Comment: occasionally     History   Drug Use No       REVIEW OF SYSTEMS:                                                    Constitutional, neuro, ENT, endocrine, pulmonary, cardiac, gastrointestinal, genitourinary, musculoskeletal, integument and psychiatric systems are negative, except as otherwise noted.    EXAM:                                                    /72 (Cuff Size: Adult Regular)  Pulse 80  Temp 98.7  F (37.1  C) (Oral)  Ht 5' 4\" (1.626 m)  Wt 176 lb (79.8 kg)  LMP 04/16/2017 (Exact Date)  BMI 30.21 kg/m2  GENERAL APPEARANCE: healthy, alert and no distress  HENT: ear canals and TM's normal and nose and mouth without ulcers or lesions  RESP: lungs clear to auscultation - no rales, rhonchi or wheezes  CV: regular rate and rhythm, normal S1 S2, no S3 or S4 and no murmur, click or rub   ABDOMEN: soft, nontender, no HSM or masses and bowel sounds normal  NEURO: Normal strength and tone, sensory exam grossly normal, mentation intact and speech normal    DIAGNOSTICS:                                                    No labs or EKG required for low risk surgery (cataract, skin procedure, breast " biopsy, etc)    Recent Labs   Lab Test  01/18/17   1017  03/03/15   1642  06/20/14   0725   HGB  15.7  14.0  15.3   PLT   --   296  282   NA   --    --   138   POTASSIUM   --    --   4.1   CR   --    --   0.80        IMPRESSION:                                                    Reason for surgery/procedure: 1. Umbilical hernia repair and 2. Uterine polyp   Diagnosis/reason for consult: Anesthesia Clearance     The proposed surgical procedure is considered INTERMEDIATE risk.    REVISED CARDIAC RISK INDEX  The patient has the following serious cardiovascular risks for perioperative complications such as (MI, PE, VFib and 3  AV Block):  No serious cardiac risks  INTERPRETATION: 0 risks: Class I (very low risk - 0.4% complication rate)    The patient has the following additional risks for perioperative complications:  No identified additional risks      ICD-10-CM    1. Preop general physical exam Z01.818    2. Visit for screening mammogram Z12.31 MA SCREENING DIGITAL BILAT - Future  (s+30)       RECOMMENDATIONS:                                                        APPROVAL GIVEN to proceed with proposed procedure, without further diagnostic evaluation     Signed Electronically by: ADELAIDE ULLOA PA-C    Copy of this evaluation report is provided to requesting physician.    [facility] Preop Guidelines

## 2017-04-21 ENCOUNTER — TRANSFERRED RECORDS (OUTPATIENT)
Dept: HEALTH INFORMATION MANAGEMENT | Facility: CLINIC | Age: 47
End: 2017-04-21

## 2017-05-02 NOTE — TELEPHONE ENCOUNTER
Left a message for Carol Ann to in regarding to the letter that was sent a few weeks ago with a test enclosed. Asked if she would please fill out and send back the test in the pre-paid envelop. Asked her to call the clinic with questions. Kimmy Caro,

## 2017-05-05 ENCOUNTER — OFFICE VISIT (OUTPATIENT)
Dept: OBGYN | Facility: CLINIC | Age: 47
End: 2017-05-05
Payer: COMMERCIAL

## 2017-05-05 ENCOUNTER — OFFICE VISIT (OUTPATIENT)
Dept: SURGERY | Facility: CLINIC | Age: 47
End: 2017-05-05
Payer: COMMERCIAL

## 2017-05-05 VITALS — HEART RATE: 69 BPM | OXYGEN SATURATION: 98 % | SYSTOLIC BLOOD PRESSURE: 121 MMHG | DIASTOLIC BLOOD PRESSURE: 85 MMHG

## 2017-05-05 VITALS
BODY MASS INDEX: 29.84 KG/M2 | SYSTOLIC BLOOD PRESSURE: 119 MMHG | HEIGHT: 64 IN | DIASTOLIC BLOOD PRESSURE: 79 MMHG | OXYGEN SATURATION: 96 % | HEART RATE: 74 BPM | TEMPERATURE: 98.1 F | RESPIRATION RATE: 16 BRPM | WEIGHT: 174.8 LBS

## 2017-05-05 DIAGNOSIS — N87.9 CERVICAL DYSPLASIA: ICD-10-CM

## 2017-05-05 DIAGNOSIS — Z09 S/P UMBILICAL HERNIA REPAIR, FOLLOW-UP EXAM: Primary | ICD-10-CM

## 2017-05-05 DIAGNOSIS — N84.0 ENDOMETRIAL POLYP: Primary | ICD-10-CM

## 2017-05-05 PROCEDURE — 99024 POSTOP FOLLOW-UP VISIT: CPT | Performed by: SURGERY

## 2017-05-05 PROCEDURE — 99212 OFFICE O/P EST SF 10 MIN: CPT | Performed by: OBSTETRICS & GYNECOLOGY

## 2017-05-05 NOTE — NURSING NOTE
"Chief Complaint   Patient presents with     Surgical Followup     Post op for hernia repair. DOS 4/21/17       Initial /79 (BP Location: Left arm, Patient Position: Chair, Cuff Size: Adult Regular)  Pulse 74  Temp 98.1  F (36.7  C) (Oral)  Resp 16  Ht 1.626 m (5' 4\")  Wt 79.3 kg (174 lb 12.8 oz)  LMP 04/16/2017 (Exact Date)  SpO2 96%  BMI 30 kg/m2 Estimated body mass index is 30 kg/(m^2) as calculated from the following:    Height as of this encounter: 1.626 m (5' 4\").    Weight as of this encounter: 79.3 kg (174 lb 12.8 oz).  Medication Reconciliation: complete     Ana Vences CMA      "

## 2017-05-05 NOTE — NURSING NOTE
"Chief Complaint   Patient presents with     Surgical Followup     2 week post op D&C       Initial BP (!) 142/102 (BP Location: Right arm, Cuff Size: Adult Regular)  Pulse 69  LMP 04/16/2017 (Exact Date)  SpO2 98% Estimated body mass index is 30 kg/(m^2) as calculated from the following:    Height as of an earlier encounter on 5/5/17: 5' 4\" (1.626 m).    Weight as of an earlier encounter on 5/5/17: 174 lb 12.8 oz (79.3 kg).  Medication Reconciliation: complete   DREA Mason 5/5/2017         "

## 2017-05-05 NOTE — PROGRESS NOTES
"General Surgery Post Op    Pt returns for follow up visit s/p open umbilical hernia.    Patient has been doing well, tolerating diet. Bowels moving well. Pain controlled. No issues with wound healing/redness/drainage. No fevers.      Physical exam: Vitals: /79 (BP Location: Left arm, Patient Position: Chair, Cuff Size: Adult Regular)  Pulse 74  Temp 98.1  F (36.7  C) (Oral)  Resp 16  Ht 1.626 m (5' 4\")  Wt 79.3 kg (174 lb 12.8 oz)  LMP 04/16/2017 (Exact Date)  SpO2 96%  BMI 30 kg/m2  BMI= Body mass index is 30 kg/(m^2).    Exam:  Constitutional: healthy, alert and no distress  Cardiovascular: negative, PMI normal. No lifts, heaves, or thrills. RRR. No murmurs, clicks gallops or rub  Respiratory: negative, Percussion normal. Good diaphragmatic excursion. Lungs clear  Gastrointestinal: Abdomen soft, non-tender. BS normal. No masses, organomegaly  Incision healing well    Assessment:     ICD-10-CM    1. S/P umbilical hernia repair, follow-up exam Z09      Plan: Doing well from repair. OK to increase activities and lifting as tolerated. Follow up with me as needed.    Galileo Vogt MD      "

## 2017-05-05 NOTE — MR AVS SNAPSHOT
After Visit Summary   5/5/2017    Carol Ann Anguiano    MRN: [id number]           Patient Information     Date Of Birth          1970        Visit Information        Provider Department      5/5/2017 7:45 AM Galileo Vogt MD [facility]        Today's Diagnoses     S/P umbilical hernia repair, follow-up exam    -  1       Follow-ups after your visit        Your next 10 appointments already scheduled     May 05, 2017  8:15 AM CDT   Post Op with Javier Huynh MD   [facility] ([address] [id number] [phone]              Who to contact     If you have questions or need follow up information about today's clinic visit or your schedule please contact [facility] directly at [phone].  Normal or non-critical lab and imaging results will be communicated to you by MyChart, letter or phone within 4 business days after the clinic has received the results. If you do not hear from us within 7 days, please contact the clinic through [employer] or phone. If you have a critical or abnormal lab result, we will notify you by phone as soon as possible.  Submit refill requests through [email or web address] or call your pharmacy and they will forward the refill request to us. Please allow 3 business days for your refill to be completed.          Additional Information About Your Visit        MyChart Information     [email or web address] gives you secure access to your electronic health record. If you see a primary care provider, you can also send messages to your care team and make appointments. If you have questions, please call your primary care clinic.  If you do not have a primary care provider, please call [phone] and they will assist you.        Care EveryWhere ID     This is your Care EveryWhere ID. This could be used by other organizations to access your [place] medical records  [id number]        Your Vitals Were   "   Pulse Temperature Respirations Height Last Period Pulse Oximetry    74 98.1  F (36.7  C) (Oral) 16 1.626 m (5' 4\") 04/16/2017 (Exact Date) 96%    BMI (Body Mass Index)                   30 kg/m2            Blood Pressure from Last 3 Encounters:   05/05/17 119/79   04/20/17 114/72   03/28/17 100/74    Weight from Last 3 Encounters:   05/05/17 79.3 kg (174 lb 12.8 oz)   04/20/17 79.8 kg (176 lb)   03/28/17 77.6 kg (171 lb)              Today, you had the following     No orders found for display       Primary Care Provider Office Phone # Fax #    Roby Charles PA-C [phone]       [address] [address]        Thank you!     Thank you for choosing [facility]  for your care. Our goal is always to provide you with excellent care. Hearing back from our patients is one way we can continue to improve our services. Please take a few minutes to complete the written survey that you may receive in the mail after your visit with us. Thank you!             Your Updated Medication List - Protect others around you: Learn how to safely use, store and throw away your medicines at www.disposemymeds.org.          This list is accurate as of: 5/5/17  7:56 AM.  Always use your most recent med list.                   Brand Name Dispense Instructions for use    albuterol 108 (90 BASE) MCG/ACT Inhaler    PROAIR HFA/PROVENTIL HFA/VENTOLIN HFA    1 Inhaler    Inhale 2 puffs into the lungs every 6 hours as needed for shortness of breath / dyspnea or wheezing       levothyroxine 200 MCG tablet    SYNTHROID/LEVOTHROID    90 tablet    Take 1 tablet (200 mcg) by mouth daily       omeprazole 40 MG capsule    priLOSEC    90 capsule    Take 1 capsule (40 mg) by mouth daily Take 30-60 minutes before a meal.         "

## 2017-05-05 NOTE — MR AVS SNAPSHOT
After Visit Summary   5/5/2017    Carol Ann Anguiano    MRN: [id number]           Patient Information     Date Of Birth          1970        Visit Information        Provider Department      5/5/2017 8:15 AM Javier Huynh MD [facility]         Follow-ups after your visit        Your next 10 appointments already scheduled     Jun 09, 2017  7:30 AM CDT   Office Visit with Javier Huynh MD   [facility] ([address] [id number] [phone]           Bring a current list of meds and any records pertaining to this visit.  For Physicals, please bring immunization records and any forms needing to be filled out.  Please arrive 10 minutes early to complete paperwork.              Who to contact     If you have questions or need follow up information about today's clinic visit or your schedule please contact [facility] directly at [phone].  Normal or non-critical lab and imaging results will be communicated to you by [employer]hart, letter or phone within 4 business days after the clinic has received the results. If you do not hear from us within 7 days, please contact the clinic through [employer] or phone. If you have a critical or abnormal lab result, we will notify you by phone as soon as possible.  Submit refill requests through [employer] or call your pharmacy and they will forward the refill request to us. Please allow 3 business days for your refill to be completed.          Additional Information About Your Visit        MyChart Information     [employer] gives you secure access to your electronic health record. If you see a primary care provider, you can also send messages to your care team and make appointments. If you have questions, please call your primary care clinic.  If you do not have a primary care provider, please call [phone] and they will assist you.        Care EveryWhere ID      This is your Care EveryWhere ID. This could be used by other organizations to access your [place] medical records  [id number]        Your Vitals Were     Pulse Last Period Pulse Oximetry             69 04/16/2017 (Exact Date) 98%          Blood Pressure from Last 3 Encounters:   05/05/17 121/85   05/05/17 119/79   04/20/17 114/72    Weight from Last 3 Encounters:   05/05/17 174 lb 12.8 oz (79.3 kg)   04/20/17 176 lb (79.8 kg)   03/28/17 171 lb (77.6 kg)              Today, you had the following     No orders found for display       Primary Care Provider Office Phone # Fax #    Roby Charles PA-C [phone]       [address] [address]        Thank you!     Thank you for choosing [facility]  for your care. Our goal is always to provide you with excellent care. Hearing back from our patients is one way we can continue to improve our services. Please take a few minutes to complete the written survey that you may receive in the mail after your visit with us. Thank you!             Your Updated Medication List - Protect others around you: Learn how to safely use, store and throw away your medicines at www.disposemymeds.org.          This list is accurate as of: 5/5/17  8:47 AM.  Always use your most recent med list.                   Brand Name Dispense Instructions for use    albuterol 108 (90 BASE) MCG/ACT Inhaler    PROAIR HFA/PROVENTIL HFA/VENTOLIN HFA    1 Inhaler    Inhale 2 puffs into the lungs every 6 hours as needed for shortness of breath / dyspnea or wheezing       levothyroxine 200 MCG tablet    SYNTHROID/LEVOTHROID    90 tablet    Take 1 tablet (200 mcg) by mouth daily       omeprazole 40 MG capsule    priLOSEC    90 capsule    Take 1 capsule (40 mg) by mouth daily Take 30-60 minutes before a meal.

## 2017-05-05 NOTE — PROGRESS NOTES
Carol Ann is a 46 year old , She is 2 weeks s/p D&C with hysteroscopy.  Her postop recovery has been uncomplicated.  She is currently requiring no medications for pain management.  no vaginal bleeding, no hot flashes. Energy level is normal.  Denies fever, chills.    The 2017 pathology report showed:   A: Endocervical curettings  -Ectocervix and endocervix with focal low-grade squamous intraepithelial lesion (JONATHON-1/HPV effect).  B: Endometrial curettings  -Benign proliferating endometrium and benign endometrial polyp.    The medical history, social history and family history have been reviewed and updated as indicated.      ROS:   ROS:4 point ROS including Respiratory, CV, GI and , other than that noted in the HPI and the PMH, is negative     PE: BP (!) 142/102 (BP Location: Right arm, Cuff Size: Adult Regular)  Pulse 69  LMP 2017 (Exact Date)  SpO2 98%  HEENT:  NC/AT, EOMI  Abd: soft, non tender, no masses  Pelvic exam:  Not performed     ASSESSMENT:  Post op  Endometrial polyp  Cervical dysplasia     PLAN:  Pathology discussed  RTC 1 month for colpo and further discussion     Javier Huynh MD

## 2017-05-23 ENCOUNTER — TELEPHONE (OUTPATIENT)
Dept: OBGYN | Facility: CLINIC | Age: 47
End: 2017-05-23

## 2017-05-23 NOTE — TELEPHONE ENCOUNTER
I called patient.  She understands she needs to have a colposcopy done for the next step in treatment.  Patient is asking if she can just skip that and arrange a hysterectomy.  Patient continues to have twice a month periods.  Her flow is heavy.  Her last period was 2 weeks ago and today she started bleeding.  She went to the bathroom and just cried.  Patient is asking if it would be appropriate at this time to do surgery.  Please advise.  Rakel Mario RN

## 2017-05-23 NOTE — TELEPHONE ENCOUNTER
Reason for Call:  Other call back    Detailed comments: Patient would like a return call to discuss having an hysterectomy ?  Please contact the patient to discuss further    Phone Number Patient can be reached at: Cell number on file:    Telephone Information:   Mobile [phone]       Best Time: Today    Can we leave a detailed message on this number? YES    Call taken on 5/23/2017 at 9:24 AM by Terrence Hood

## 2017-05-24 NOTE — TELEPHONE ENCOUNTER
I called patient and reached voicemail.  I left a message I called and I will attempt again.   Javier Huynh MD

## 2017-05-25 NOTE — TELEPHONE ENCOUNTER
I called patient and reached voice mail.  I left the message that I called and I will attempt again.   Javier Huynh MD

## 2017-06-01 NOTE — TELEPHONE ENCOUNTER
Patient is not wanting to do the colposcopy which is scheduled on 6/9/2017.  Do you want to call her today after 4pm or would you like her to keep the visit and change it to consultation for surgery?  Rakel Mario RN

## 2017-06-01 NOTE — TELEPHONE ENCOUNTER
Patient is calling back and is saying she would like a call back. She is asking for a call back after 4 as she is at work. Or a direct phone # to talk directly with him. She does not want to have the colposcopy and would like to discuss different options.  Thank-you,  .Polina Chavarria

## 2017-06-01 NOTE — TELEPHONE ENCOUNTER
I called patient at 1710.  I reached voice mail and I left a message I called and I will attempt again.   Javier Huynh MD

## 2017-06-03 ENCOUNTER — HEALTH MAINTENANCE LETTER (OUTPATIENT)
Age: 47
End: 2017-06-03

## 2017-06-06 NOTE — TELEPHONE ENCOUNTER
I called patient.    She has appointment in 3 days and she will do the colpo.  We will discuss further at that time.   Javier Huynh MD

## 2017-06-09 ENCOUNTER — OFFICE VISIT (OUTPATIENT)
Dept: OBGYN | Facility: CLINIC | Age: 47
End: 2017-06-09
Payer: COMMERCIAL

## 2017-06-09 VITALS
OXYGEN SATURATION: 97 % | SYSTOLIC BLOOD PRESSURE: 108 MMHG | DIASTOLIC BLOOD PRESSURE: 73 MMHG | BODY MASS INDEX: 30.38 KG/M2 | HEART RATE: 65 BPM | WEIGHT: 177 LBS

## 2017-06-09 DIAGNOSIS — N87.0 CIN I (CERVICAL INTRAEPITHELIAL NEOPLASIA I): Primary | ICD-10-CM

## 2017-06-09 PROCEDURE — 99213 OFFICE O/P EST LOW 20 MIN: CPT | Mod: 25 | Performed by: OBSTETRICS & GYNECOLOGY

## 2017-06-09 PROCEDURE — 88305 TISSUE EXAM BY PATHOLOGIST: CPT | Performed by: OBSTETRICS & GYNECOLOGY

## 2017-06-09 PROCEDURE — 57454 BX/CURETT OF CERVIX W/SCOPE: CPT | Performed by: OBSTETRICS & GYNECOLOGY

## 2017-06-09 NOTE — MR AVS SNAPSHOT
After Visit Summary   6/9/2017    Carol Ann Anguiano    MRN: [id number]           Patient Information     Date Of Birth          1970        Visit Information        Provider Department      6/9/2017 7:30 AM Javier Huynh MD; [facility] [place] [facility]        Today's Diagnoses     JONATHON I (cervical intraepithelial neoplasia I)    -  1       Follow-ups after your visit        Who to contact     If you have questions or need follow up information about today's clinic visit or your schedule please contact [facility] directly at [phone].  Normal or non-critical lab and imaging results will be communicated to you by XMarkethart, letter or phone within 4 business days after the clinic has received the results. If you do not hear from us within 7 days, please contact the clinic through [employer] or phone. If you have a critical or abnormal lab result, we will notify you by phone as soon as possible.  Submit refill requests through Gl[employer] or call your pharmacy and they will forward the refill request to us. Please allow 3 business days for your refill to be completed.          Additional Information About Your Visit        MyChart Information     G[employer] gives you secure access to your electronic health record. If you see a primary care provider, you can also send messages to your care team and make appointments. If you have questions, please call your primary care clinic.  If you do not have a primary care provider, please call [phone] and they will assist you.        Care EveryWhere ID     This is your Care EveryWhere ID. This could be used by other organizations to access your [place] medical records  [id number]        Your Vitals Were     Pulse Last Period Pulse Oximetry Breastfeeding? BMI (Body Mass Index)       65 05/26/2017 97% No 30.38 kg/m2        Blood Pressure from Last 3 Encounters:   06/09/17 108/73   05/05/17 121/85   05/05/17 119/79     Weight from Last 3 Encounters:   06/09/17 177 lb (80.3 kg)   05/05/17 174 lb 12.8 oz (79.3 kg)   04/20/17 176 lb (79.8 kg)              We Performed the Following     COLP CERVIX/UPPER VAGINA     Surgical pathology exam        Primary Care Provider Office Phone # Fax #    Roby Charles PA-C [phone]       [address] [address]        Thank you!     Thank you for choosing [facility]  for your care. Our goal is always to provide you with excellent care. Hearing back from our patients is one way we can continue to improve our services. Please take a few minutes to complete the written survey that you may receive in the mail after your visit with us. Thank you!             Your Updated Medication List - Protect others around you: Learn how to safely use, store and throw away your medicines at www.disposemymeds.org.          This list is accurate as of: 6/9/17 11:59 PM.  Always use your most recent med list.                   Brand Name Dispense Instructions for use    albuterol 108 (90 BASE) MCG/ACT Inhaler    PROAIR HFA/PROVENTIL HFA/VENTOLIN HFA    1 Inhaler    Inhale 2 puffs into the lungs every 6 hours as needed for shortness of breath / dyspnea or wheezing       levothyroxine 200 MCG tablet    SYNTHROID/LEVOTHROID    90 tablet    Take 1 tablet (200 mcg) by mouth daily       omeprazole 40 MG capsule    priLOSEC    90 capsule    Take 1 capsule (40 mg) by mouth daily Take 30-60 minutes before a meal.

## 2017-06-09 NOTE — NURSING NOTE
"Chief Complaint   Patient presents with     Colposcopy     LSIL papsmear done at hospital       Initial /73 (BP Location: Left arm, Cuff Size: Adult Regular)  Pulse 65  Wt 177 lb (80.3 kg)  LMP 05/26/2017  SpO2 97%  Breastfeeding? No  BMI 30.38 kg/m2 Estimated body mass index is 30.38 kg/(m^2) as calculated from the following:    Height as of 5/5/17: 5' 4\" (1.626 m).    Weight as of this encounter: 177 lb (80.3 kg).  Medication Reconciliation: complete   DREA Mason 6/9/2017         "

## 2017-06-09 NOTE — PROGRESS NOTES
Patient Name: Carol Ann Anguiano              Date: 2017   YOB: 1970                              Age: 46 year old   Phone: [phone] (home) [phone] (work)  ________________________________________________________________________  Carol Ann Anguiano is a 46 year old female, , who presents today for further evaluation of the pathology results from her D&C.  She had the D&C for abnormal uterine bleeding and the endocervical sampling showed LSIL/JONATHON I.  She has not had an abnormal pap smear previously.    I attempted to ensure that the patient was educated regarding the nature of her findings and implications to date.  We reviewed the role of HPV, incidence in the population and the natural history of the infection, and its transmission.  We also reviewed ways to minimize her future risk, the effect of HPV on the cervix and treatment options available, should they be indicated.    The pathophysiology of the cervix, including a discussion of the squamous and columnar cells, metaplasia and dysplasia have been reviewed, drawings, sketches and the pamphlets were reviewed with her.      Patient's last menstrual period was 2017.      Past Medical History:   Diagnosis Date     Arthritis     great aunt     ASTHMA - MILD INTERMITTENT 2007     Cancer (H)     grandfather     Coronary artery disease     Grandfather and all 3 uncles have had heart attacks     Depressive disorder     myself and my mother     Family history of cancer     grandfather     Hypertension     Mother and grandmother     Unspecified hypothyroidism        Past Surgical History:   Procedure Laterality Date     CHOLECYSTECTOMY, LAPOROSCOPIC      Cholecystectomy, Laparoscopic     DILATION AND CURETTAGE, HYSTEROSCOPY DIAGNOSTIC, COMBINED  2017    abnormal uterine bleeding     LASIK BILATERAL          Outpatient Encounter Prescriptions as of 2017   Medication Sig Dispense Refill     levothyroxine (SYNTHROID,  LEVOTHROID) 200 MCG tablet Take 1 tablet (200 mcg) by mouth daily 90 tablet 2     albuterol (PROAIR HFA/PROVENTIL HFA/VENTOLIN HFA) 108 (90 BASE) MCG/ACT Inhaler Inhale 2 puffs into the lungs every 6 hours as needed for shortness of breath / dyspnea or wheezing 1 Inhaler 1     omeprazole (PRILOSEC) 40 MG capsule Take 1 capsule (40 mg) by mouth daily Take 30-60 minutes before a meal. (Patient not taking: Reported on 5/5/2017) 90 capsule 3     No facility-administered encounter medications on file as of 6/9/2017.         Allergies as of 06/09/2017 - Galileo as Reviewed 06/09/2017   Allergen Reaction Noted     Chantix [varenicline]  06/06/2016     Eryc [erythromycin]  12/30/2005       Social History     Social History     Marital status:      Spouse name: Saul     Number of children: 2     Years of education: 14     Occupational History      [employer]     Social History Main Topics     Smoking status: Former Smoker     Packs/day: 1.00     Years: 15.00     Types: Cigarettes     Smokeless tobacco: Never Used      Comment: We are attempting again to quit 3/2017     Alcohol use 0.0 oz/week     0 Standard drinks or equivalent per week      Comment: occasionally     Drug use: No     Sexual activity: Yes     Partners: Male     Birth control/ protection: None      Comment: my  has a vasectomy     Other Topics Concern     Parent/Sibling W/ Cabg, Mi Or Angioplasty Before 65f 55m? No     Social History Narrative    Dairy/d 3-4 servings/d.     Caffeine 1 servings/d    Exercise 0 x week very active    Sunscreen used - Yes    Seatbelts used - Yes    Working smoke/CO detectors in the home - Yes    Guns stored in the home - No    Self Breast Exams - Yes    Self Testicular Exam - NA    Eye Exam up to date - Due for an appointment.     Dental Exam up to date - Due for an appointment.     Pap Smear up to date - Yes    Mammogram up to date - n/a    PSA up to date - NA    Dexa Scan up to date - NA     Flex Sig / Colonoscopy up to date - NA    Immunizations up to date - Yes    Abuse: Current or Past(Physical, Sexual or Emotional)- No    Do you feel safe in your environment - Yes    Saul Blackman MA    9/21/07            Family History   Problem Relation Age of Onset     Hypertension Mother      Depression Mother      DIABETES Father      Hypertension Maternal Grandmother      CANCER Paternal Grandfather      lungs/throat     Psychotic Disorder Son      adhd/ocd     Asthma Other      Thyroid Disease Other          Review of Systems:  10 point ROS of systems including Constitutional, Eyes, Respiratory, Cardiovascular, Gastroenterology, Genitourinary, Integumentary, Muscularskeletal, Psychiatric were all negative except for pertinent positives noted in my HPI and in the PMH.      Exam:   /73 (BP Location: Left arm, Cuff Size: Adult Regular)  Pulse 65  Wt 177 lb (80.3 kg)  LMP 05/26/2017  SpO2 97%  Breastfeeding? No  BMI 30.38 kg/m2  GENERAL:  WNWD female NAD  HEENT: NC/AT, EOMI  Lungs:  Good respiratory effort   SKIN: normal skin turgor  GAIT: Normal  NECK: Symmetrical, no masses noted   VULVA: Normal Genitalia  BUS: Normal  URETHRA:  No hypermobility noted  URETHRAL MEATUS:  No masses noted  VAGINA: Normal mucosa, no discharge  CERVIX: Closed, mobile, no discharge  PERIANAL:  No masses or lesions seen  EXTREMITIES: no clubbing, cyanosis, or edema      Assessment:  LSIL/JONATHON I on ECC      Plan:  Recommend to Proceed with Colpo  The details of the colposcopic procedure were reviewed, the risks of missed diagnoses, pain, infection, and bleeding.    TT 20 min, in addition to the time for the procedure  CT greater than 50%, as noted above in the HPI and in the Plan.     Javier Huynh MD        Procedure:  Procedure for colposcopy and biopsy has been explained to the patient and consent obtained.    Before the procedure, it was ensured that the patient was educated regarding the nature of her findings and  implications to date.  We reviewed the role of HPV and the natural history of the infection.  We also reviewed ways to minimize her future risk, the effect of HPV on the cervix and treatment options available, should they be indicated.    The pathophysiology of the cervix, including a discussion of the squamous and columnar cells, metaplasia and dysplasia have been reviewed, drawings, sketches and the pamphlets were reviewed with her.  The details of the colposcopic procedure were reviewed, the risks of missed diagnoses, pain, infection, and bleeding.  Questions seemed to be answered before proceeding and the patient then consented to the procedure.     Speculum placed in vagina and excellent visualization of cervix achieved, cervix swabbed  with acetic acid solution.    biopsies taken (including ECC): 2  Hemostasis effected with Silver Nitrate    Findings:  Cervix: no visible lesions and no concerning findings  Vaginal inspection: normal without visible lesions.  Procedure Summary: Patient tolerated procedure well.      Assessment:   LSIL/JONATHON I on prior ECC     Plan:  Specimens labelled and sent to pathology.  Will base further treatment on pathology findings.  Post biopsy instructions given to patient and call to discuss Pathology results.    Javier Huynh MD

## 2017-06-13 PROBLEM — N87.0 CIN I (CERVICAL INTRAEPITHELIAL NEOPLASIA I): Status: ACTIVE | Noted: 2017-06-13

## 2017-06-16 LAB — COPATH REPORT: NORMAL

## 2017-10-06 ENCOUNTER — OFFICE VISIT (OUTPATIENT)
Dept: FAMILY MEDICINE | Facility: CLINIC | Age: 47
End: 2017-10-06
Payer: COMMERCIAL

## 2017-10-06 VITALS
SYSTOLIC BLOOD PRESSURE: 108 MMHG | TEMPERATURE: 99 F | DIASTOLIC BLOOD PRESSURE: 72 MMHG | HEIGHT: 64 IN | BODY MASS INDEX: 29.71 KG/M2 | HEART RATE: 80 BPM | WEIGHT: 174 LBS

## 2017-10-06 DIAGNOSIS — M54.9 RIGHT-SIDED BACK PAIN, UNSPECIFIED BACK LOCATION, UNSPECIFIED CHRONICITY: ICD-10-CM

## 2017-10-06 DIAGNOSIS — R11.2 INTRACTABLE VOMITING WITH NAUSEA, UNSPECIFIED VOMITING TYPE: ICD-10-CM

## 2017-10-06 DIAGNOSIS — R11.0 NAUSEA: Primary | ICD-10-CM

## 2017-10-06 DIAGNOSIS — E03.9 HYPOTHYROIDISM, UNSPECIFIED TYPE: ICD-10-CM

## 2017-10-06 DIAGNOSIS — R63.5 WEIGHT GAIN: ICD-10-CM

## 2017-10-06 DIAGNOSIS — L82.0 INFLAMED SEBORRHEIC KERATOSIS: ICD-10-CM

## 2017-10-06 DIAGNOSIS — R51.9 NONINTRACTABLE HEADACHE, UNSPECIFIED CHRONICITY PATTERN, UNSPECIFIED HEADACHE TYPE: ICD-10-CM

## 2017-10-06 DIAGNOSIS — R42 DIZZINESS: ICD-10-CM

## 2017-10-06 DIAGNOSIS — Z23 NEED FOR PROPHYLACTIC VACCINATION AND INOCULATION AGAINST INFLUENZA: ICD-10-CM

## 2017-10-06 DIAGNOSIS — R19.7 DIARRHEA, UNSPECIFIED TYPE: ICD-10-CM

## 2017-10-06 LAB
ALBUMIN UR-MCNC: ABNORMAL MG/DL
APPEARANCE UR: CLEAR
BILIRUB UR QL STRIP: NEGATIVE
COLOR UR AUTO: YELLOW
ERYTHROCYTE [DISTWIDTH] IN BLOOD BY AUTOMATED COUNT: 14.1 % (ref 10–15)
GLUCOSE UR STRIP-MCNC: NEGATIVE MG/DL
HCT VFR BLD AUTO: 46.2 % (ref 35–47)
HGB BLD-MCNC: 15.8 G/DL (ref 11.7–15.7)
HGB UR QL STRIP: ABNORMAL
KETONES UR STRIP-MCNC: NEGATIVE MG/DL
LEUKOCYTE ESTERASE UR QL STRIP: NEGATIVE
MCH RBC QN AUTO: 30.6 PG (ref 26.5–33)
MCHC RBC AUTO-ENTMCNC: 34.2 G/DL (ref 31.5–36.5)
MCV RBC AUTO: 89 FL (ref 78–100)
NITRATE UR QL: NEGATIVE
NON-SQ EPI CELLS #/AREA URNS LPF: ABNORMAL /LPF
PH UR STRIP: 6 PH (ref 5–7)
PLATELET # BLD AUTO: 335 10E9/L (ref 150–450)
RBC # BLD AUTO: 5.17 10E12/L (ref 3.8–5.2)
RBC #/AREA URNS AUTO: ABNORMAL /HPF
SOURCE: ABNORMAL
SP GR UR STRIP: >1.03 (ref 1–1.03)
T3FREE SERPL-MCNC: 3.6 PG/ML (ref 2.3–4.2)
UROBILINOGEN UR STRIP-ACNC: 0.2 EU/DL (ref 0.2–1)
VIT B12 SERPL-MCNC: 302 PG/ML (ref 193–986)
WBC # BLD AUTO: 9 10E9/L (ref 4–11)
WBC #/AREA URNS AUTO: ABNORMAL /HPF

## 2017-10-06 PROCEDURE — 85027 COMPLETE CBC AUTOMATED: CPT | Performed by: PHYSICIAN ASSISTANT

## 2017-10-06 PROCEDURE — 81001 URINALYSIS AUTO W/SCOPE: CPT | Performed by: PHYSICIAN ASSISTANT

## 2017-10-06 PROCEDURE — 84443 ASSAY THYROID STIM HORMONE: CPT | Performed by: PHYSICIAN ASSISTANT

## 2017-10-06 PROCEDURE — 36415 COLL VENOUS BLD VENIPUNCTURE: CPT | Performed by: PHYSICIAN ASSISTANT

## 2017-10-06 PROCEDURE — 86003 ALLG SPEC IGE CRUDE XTRC EA: CPT | Performed by: PHYSICIAN ASSISTANT

## 2017-10-06 PROCEDURE — 82607 VITAMIN B-12: CPT | Performed by: PHYSICIAN ASSISTANT

## 2017-10-06 PROCEDURE — 82784 ASSAY IGA/IGD/IGG/IGM EACH: CPT | Performed by: PHYSICIAN ASSISTANT

## 2017-10-06 PROCEDURE — 99215 OFFICE O/P EST HI 40 MIN: CPT | Mod: 25 | Performed by: PHYSICIAN ASSISTANT

## 2017-10-06 PROCEDURE — 83516 IMMUNOASSAY NONANTIBODY: CPT | Performed by: PHYSICIAN ASSISTANT

## 2017-10-06 PROCEDURE — 84481 FREE ASSAY (FT-3): CPT | Performed by: PHYSICIAN ASSISTANT

## 2017-10-06 PROCEDURE — 90686 IIV4 VACC NO PRSV 0.5 ML IM: CPT | Performed by: PHYSICIAN ASSISTANT

## 2017-10-06 PROCEDURE — 90471 IMMUNIZATION ADMIN: CPT | Performed by: PHYSICIAN ASSISTANT

## 2017-10-06 PROCEDURE — 82728 ASSAY OF FERRITIN: CPT | Performed by: PHYSICIAN ASSISTANT

## 2017-10-06 PROCEDURE — 84439 ASSAY OF FREE THYROXINE: CPT | Performed by: PHYSICIAN ASSISTANT

## 2017-10-06 NOTE — NURSING NOTE
"Chief Complaint   Patient presents with     Dizziness     Back Pain     Gastric Problem     Flu Shot       Initial /72 (Cuff Size: Adult Large)  Pulse 80  Temp 99  F (37.2  C) (Oral)  Ht 5' 4\" (1.626 m)  Wt 174 lb (78.9 kg)  LMP 09/30/2017 (Exact Date)  BMI 29.87 kg/m2 Estimated body mass index is 29.87 kg/(m^2) as calculated from the following:    Height as of this encounter: 5' 4\" (1.626 m).    Weight as of this encounter: 174 lb (78.9 kg).  Medication Reconciliation: complete   Jayde Falcon, Certified Medical Assistant (AAMA)     "

## 2017-10-06 NOTE — PROGRESS NOTES
Injectable Influenza Immunization Documentation    1.  Is the person to be vaccinated sick today?   No    2. Does the person to be vaccinated have an allergy to a component   of the vaccine?   No    3. Has the person to be vaccinated ever had a serious reaction   to influenza vaccine in the past?   No    4. Has the person to be vaccinated ever had Guillain-Barré syndrome?   No    Form completed by Iris Johnson CMA

## 2017-10-06 NOTE — PROGRESS NOTES
SUBJECTIVE:   Carol Ann Anguiano is a 47 year old female who presents to clinic today for the following health issues:    Extensive list of concerns today:     ABDOMINAL   PAIN     Onset: couple of years, now it's a daily thing    Description:   Character: nausea  Location: patient states that she just feels nauseous   Radiation: None    Intensity: severe    Progression of Symptoms:  worsening and constant    Accompanying Signs & Symptoms:  Fever/Chills?: YES- sweaty/hot, then freezing after  Gas/Bloating: not sure   Nausea: YES  Vomitting: YES, most dry heaves in morning  Diarrhea?: YES  Constipation:YES  Dysuria or Hematuria: no    History:   Trauma: YES- hernia repair recently  Previous similar pain: YES   Previous tests done: none    Precipitating factors:   Does the pain change with:     Food: YES- worsens     BM: no     Urination: no     Alleviating factors:  sleeping    Therapies Tried and outcome: food avoidance    LMP:  9/30/17     Gallbladder 10+ years ago removed. Doesn't get specific RUQ pain, just generalized epigastric discomfort.     Getting diarrhea - sometimes within minutes of eating - un digested foods - loose stools, no mucus or blood.     Back Pain       Duration: couple of weeks        Specific cause: none    Description:   Location of pain: low back right and middle of back right  Character of pain: dull ache, burning, cramping and constant  Pain radiation:none  New numbness or weakness in legs, not attributed to pain:  Not sure, feet fall asleep often     Intensity: At its worst 8/10    History:   Pain interferes with job: YES  History of back problems: recurrent self limited episodes of low back pain in the past  Any previous MRI or X-rays: None  Sees a specialist for back pain:  No  Therapies tried without relief: none    Alleviating factors:   Improved by: Aleve      Precipitating factors:  Worsened by: Sitting and Lying Flat    Functional and Psychosocial Screen (Damien STarT Back):       Not performed today    Headaches - started for the past month, maybe 2 - 3-4 x a week, mostly in the morning, sometimes end of day. Severe headaches - some sensitivity to light / sharp pains down back of head and into neck - rubbing neck helps. Aleve helps - Taking Aleve a few times a week.     Dizziness  Onset: 3 weeks - has happened a couple of times when bent over and a couple of times with standing too fast. Never a spinning, more of a woozy sensation.     Description:   Do you feel faint:  no   Does it feel like the surroundings (bed, room) are moving: YES  Unsteady/off balance: YES  Have you passed out or fallen: no     Intensity: moderate    Progression of Symptoms:  improving    Accompanying Signs & Symptoms:  Heart palpitations: YES- fluttering every so often  Nausea, vomiting: YES  Weakness in arms or legs: YES- feels sluggish and tired all the time  Fatigue: YES  Vision or speech changes: YES- both  Ringing in ears (Tinnitus): no   Hearing Loss: no     History:   Head trauma/concussion hx: no   Previous similar symptoms: no   Recent bleeding history: YES, menstrual cycle was every 17 days    Precipitating factors:   Worse with activity or head movement: would happen when she would bend over or get up too fast   Any new medications (BP?): no   Alcohol/drug abuse/withdrawal: no     Alleviating factors:   Does staying in a fixed position give relief:  YES    Therapies Tried and outcome: none    Posterior left calf - lesion was frozen and now seems bigger - used an over the counter.     6-8 weeks, spot on abdomen - getting thicker / crusty - denies bleeding or other changes.    Patient Active Problem List   Diagnosis     Mild intermittent asthma     CARDIOVASCULAR SCREENING; LDL GOAL LESS THAN 160     Globus pharyngeus     Tobacco use disorder     Hypothyroidism, unspecified hypothyroidism type     Abnormal uterine bleeding     JONATHON I (cervical intraepithelial neoplasia I)      Current Outpatient  "Prescriptions   Medication     albuterol (PROAIR HFA/PROVENTIL HFA/VENTOLIN HFA) 108 (90 BASE) MCG/ACT Inhaler     omeprazole (PRILOSEC) 40 MG capsule     levothyroxine (SYNTHROID, LEVOTHROID) 200 MCG tablet     No current facility-administered medications for this visit.         Problem list and histories reviewed & adjusted, as indicated.  Additional history: as documented    Labs reviewed in EPIC    Reviewed and updated as needed this visit by clinical staffTobacco  Allergies  Med Hx  Surg Hx  Fam Hx  Soc Hx      Reviewed and updated as needed this visit by Provider         ROS:  Constitutional, HEENT, cardiovascular, pulmonary, GI, , musculoskeletal, neuro, skin, endocrine and psych systems are negative, except as otherwise noted.      OBJECTIVE:   /72 (Cuff Size: Adult Large)  Pulse 80  Temp 99  F (37.2  C) (Oral)  Ht 5' 4\" (1.626 m)  Wt 174 lb (78.9 kg)  LMP 09/30/2017 (Exact Date)  BMI 29.87 kg/m2  Body mass index is 29.87 kg/(m^2).  GENERAL: healthy, alert and no distress  EYES: Eyes grossly normal to inspection, PERRL and conjunctivae and sclerae normal  HENT: ear canals and TM's normal, nose and mouth without ulcers or lesions  NECK: no adenopathy, no asymmetry, masses, or scars and thyroid normal to palpation  RESP: lungs clear to auscultation - no rales, rhonchi or wheezes  CV: regular rate and rhythm, normal S1 S2, no S3 or S4, no murmur, click or rub, no peripheral edema and peripheral pulses strong  ABDOMEN: soft, nontender, no hepatosplenomegaly, no masses and bowel sounds normal  MS: Right mid to low  spasms of the muscle groups without bony tenderness and otherwise no gross musculoskeletal defects noted, no edema  SKIN: Abdomen, a verrucous papule, 3 mm diameter noted, left posterior calf, oily, keratotic 8-9 mm brown well defined stuck on papule, otherwise no suspicious lesions or rashes  NEURO: Normal strength and tone, mentation intact and speech normal  PSYCH: " mentation appears normal, affect normal/bright  LYMPH: no cervical, supraclavicular, axillary, or inguinal adenopathy        ASSESSMENT/PLAN:     (R11.0) Nausea  (primary encounter diagnosis)  Comment:   Plan: GASTROENTEROLOGY ADULT REF CONSULT ONLY, Tissue        transglutaminase miguel IgA and IgG, IgA, Allergy         adult food panel, Urine Microscopic        Ongoing issue. Will check labs we haven't done. Refer to GI for a full consult considering the pain and diarrhea she's had, SBO? Enzyme deficiency? - she will need further imaging and work up, defer to them to coordinate the multiple things she will need.     (R11.2) Intractable vomiting with nausea, unspecified vomiting type  Comment:   Plan: GASTROENTEROLOGY ADULT REF CONSULT ONLY, Tissue        transglutaminase miguel IgA and IgG, IgA, Allergy         adult food panel        Ongoing issue - as noted, refer. Dietary and lifestyle change advised including slow eating of meals, chewing well, stephanie teas, probiotics.     (R19.7) Diarrhea, unspecified type  Comment:   Plan: GASTROENTEROLOGY ADULT REF CONSULT ONLY, Tissue        transglutaminase miguel IgA and IgG, IgA, Allergy         adult food panel        As noted - referring to GI. Warning symptoms of worsening condition discussed and patient shows good understanding.     (E03.9) Hypothyroidism, unspecified type  Comment:   Plan: TSH, T4, free, T3, Free        Recheck labs     (R63.5) Weight gain  Comment:   Plan: TSH, T4, free, T3, Free        Recheck labs     (M54.9) Right-sided back pain, unspecified back location, unspecified chronicity  Comment:   Plan: *UA reflex to Microscopic and Culture ([facility] (except [place] and         [place])        Muscle spasms. UA reassuring. Stretches, heat, Epsom salt soaks, massage, chiro, PT. If not improving, recommend consider imaging    (L82.0) Inflamed seborrheic keratosis  Comment:   Plan: Left posterior calf, from self treatment, recommend  "can consider cryotherapy at a future visit.     (R42) Dizziness  Comment:   Plan: CBC with platelets, Ferritin, Vitamin B12        Uncertain. Push fluids. Better sleep. Stress management. Diet / lifestyle - no vertigo symptoms and certainly no signs suggesting positional vertigo, just feels \"light headed\" upon moving from a head down position to a head up position. Checking labs as noted - could be hydration status, headaches, tension, orthostatic BP changes? Will evaluate at our follow up visit considering her list of concerns today - she agreed to re discuss at our next visit in a week or two    (R51) Nonintractable headache, unspecified chronicity pattern, unspecified headache type  Comment:   Plan: Vitamin B12        Ongoing - will also defer more specifics to follow up visit     (Z23) Need for prophylactic vaccination and inoculation against influenza  Comment:   Plan: FLU VAC, SPLIT VIRUS IM > 3 YO (QUADRIVALENT)         [43650], Vaccine Administration, Initial         [34069]        Given    I spent 40 minutes with Carol Ann over 50% was education / counseling and coordination of care today.        ADELAIDE ULLOA PA-C  [facility]  "

## 2017-10-06 NOTE — MR AVS SNAPSHOT
After Visit Summary   10/6/2017    Carol Ann Anguiano    MRN: [id number]           Patient Information     Date Of Birth          1970        Visit Information        Provider Department      10/6/2017 2:40 PM Roby Charles PA-C [facility]        Today's Diagnoses     Nausea    -  1    Intractable vomiting with nausea, unspecified vomiting type        Diarrhea, unspecified type        Hypothyroidism, unspecified type        Weight gain        Right-sided back pain, unspecified back location, unspecified chronicity        Inflamed seborrheic keratosis        Dizziness        Nonintractable headache, unspecified chronicity pattern, unspecified headache type          Care Instructions    1. Whole 30? Can try  2. Do food / symptom diary for a few weeks - can sometimes find a connection  3. 2 cups of epsom salt in jacuzzi a few days a week - magnesium can help with tight muscles and anxiety - consider youtube for low back / thoracic stretches   4. See me in a few weeks to recheck and review labs and freeze the spot on your stomach           Follow-ups after your visit        Additional Services     GASTROENTEROLOGY ADULT REF CONSULT ONLY       Preferred Location: [facility] [phone], [facility]: [phone] and [facility] [phone]      Please be aware that coverage of these services is subject to the terms and limitations of your health insurance plan.  Call member services at your health plan with any benefit or coverage questions.  Any procedures must be performed at a [facility] facility OR coordinated by your clinic's referral office.    Please bring the following with you to your appointment:    (1) Any X-Rays, CTs or MRIs which have been performed.  Contact the facility where they were done to arrange for  prior to your scheduled appointment.    (2) List of current medications   (3) This referral request   (4) Any documents/labs given  "to you for this referral                  Who to contact     If you have questions or need follow up information about today's clinic visit or your schedule please contact [facility] directly at [phone].  Normal or non-critical lab and imaging results will be communicated to you by MyChart, letter or phone within 4 business days after the clinic has received the results. If you do not hear from us within 7 days, please contact the clinic through MyChart or phone. If you have a critical or abnormal lab result, we will notify you by phone as soon as possible.  Submit refill requests through [employer] or call your pharmacy and they will forward the refill request to us. Please allow 3 business days for your refill to be completed.          Additional Information About Your Visit        [employer] Information     [employer] gives you secure access to your electronic health record. If you see a primary care provider, you can also send messages to your care team and make appointments. If you have questions, please call your primary care clinic.  If you do not have a primary care provider, please call [phone] and they will assist you.        Care EveryWhere ID     This is your Care EveryWhere ID. This could be used by other organizations to access your [place] medical records  [id number]        Your Vitals Were     Pulse Temperature Height Last Period BMI (Body Mass Index)       80 99  F (37.2  C) (Oral) 5' 4\" (1.626 m) 09/30/2017 (Exact Date) 29.87 kg/m2        Blood Pressure from Last 3 Encounters:   10/06/17 108/72   06/09/17 108/73   05/05/17 121/85    Weight from Last 3 Encounters:   10/06/17 174 lb (78.9 kg)   06/09/17 177 lb (80.3 kg)   05/05/17 174 lb 12.8 oz (79.3 kg)              We Performed the Following     *UA reflex to Microscopic and Culture ([place] [facility] (except [place] and [place])     Allergy adult food panel     CBC with platelets     Ferritin     " GASTROENTEROLOGY ADULT REF CONSULT ONLY     IgA     T3, Free     T4, free     Tissue transglutaminase miguel IgA and IgG     TSH     Vitamin B12        Primary Care Provider Office Phone # Fax #    Roby Charles PA-C [phone] [address] [facility] [address]        Equal Access to Services     CELESTE ALFORD : Hadii aad ku hadasho Soomaali, waaxda luqadaha, qaybta kaalmada adeegyada, waxay idiin hayaan adeeg rominalanadavid casas. So [facility] [phone].    ATENCIÓN: Si habla español, tiene a duran disposición servicios gratuitos de asistencia lingüística. Llame al [phone].    We comply with applicable federal civil rights laws and [place] laws. We do not discriminate on the basis of race, color, national origin, age, disability, sex, sexual orientation, or gender identity.            Thank you!     Thank you for choosing [facility]  for your care. Our goal is always to provide you with excellent care. Hearing back from our patients is one way we can continue to improve our services. Please take a few minutes to complete the written survey that you may receive in the mail after your visit with us. Thank you!             Your Updated Medication List - Protect others around you: Learn how to safely use, store and throw away your medicines at www.disposemymeds.org.          This list is accurate as of: 10/6/17  3:32 PM.  Always use your most recent med list.                   Brand Name Dispense Instructions for use Diagnosis    albuterol 108 (90 BASE) MCG/ACT Inhaler    PROAIR HFA/PROVENTIL HFA/VENTOLIN HFA    1 Inhaler    Inhale 2 puffs into the lungs every 6 hours as needed for shortness of breath / dyspnea or wheezing    Intermittent asthma, uncomplicated       levothyroxine 200 MCG tablet    SYNTHROID/LEVOTHROID    90 tablet    Take 1 tablet (200 mcg) by mouth daily    Hypothyroidism       omeprazole 40 MG capsule    priLOSEC    90 capsule    Take 1 capsule (40 mg)  by mouth daily Take 30-60 minutes before a meal.    Nausea

## 2017-10-06 NOTE — PATIENT INSTRUCTIONS
1. Whole 30? Can try  2. Do food / symptom diary for a few weeks - can sometimes find a connection  3. 2 cups of epsom salt in jacuzzi a few days a week - magnesium can help with tight muscles and anxiety - consider youtube for low back / thoracic stretches   4. See me in a few weeks to recheck and review labs and freeze the spot on your stomach

## 2017-10-07 LAB
FERRITIN SERPL-MCNC: 57 NG/ML (ref 8–252)
T4 FREE SERPL-MCNC: 2.02 NG/DL (ref 0.76–1.46)
TSH SERPL DL<=0.005 MIU/L-ACNC: 0.13 MU/L (ref 0.4–4)

## 2017-10-08 LAB
CLAM IGE QN: <0.1 KU(A)/L
CODFISH IGE QN: <0.1 KU(A)/L
CORN IGE QN: <0.1 KU(A)/L
COW MILK IGE QN: <0.1 KU/L
EGG WHITE IGE QN: <0.1 KU(A)/L
PEANUT IGE QN: <0.1 KU(A)/L
SCALLOP IGE QN: <0.1 KU(A)/L
SHRIMP IGE QN: 0.15 KU(A)/L
SOYBEAN IGE QN: <0.1 KU(A)/L
WALNUT IGE QN: <0.1 KU(A)/L
WHEAT IGE QN: <0.1 KU(A)/L

## 2017-10-09 ENCOUNTER — MYC MEDICAL ADVICE (OUTPATIENT)
Dept: FAMILY MEDICINE | Facility: CLINIC | Age: 47
End: 2017-10-09

## 2017-10-09 LAB — IGA SERPL-MCNC: 106 MG/DL (ref 70–380)

## 2017-10-10 LAB
TTG IGA SER-ACNC: <1 U/ML
TTG IGG SER-ACNC: <1 U/ML

## 2017-10-16 ENCOUNTER — MYC MEDICAL ADVICE (OUTPATIENT)
Dept: FAMILY MEDICINE | Facility: CLINIC | Age: 47
End: 2017-10-16

## 2017-10-16 NOTE — TELEPHONE ENCOUNTER
Routed to PCP see my chart message response, do you wish to do any further testing/ordering before you see her next Monday re: blood In urine?  Myriam Roland,Clinic Rn  [facility]

## 2017-10-19 ENCOUNTER — MYC MEDICAL ADVICE (OUTPATIENT)
Dept: FAMILY MEDICINE | Facility: CLINIC | Age: 47
End: 2017-10-19

## 2017-10-23 ENCOUNTER — OFFICE VISIT (OUTPATIENT)
Dept: FAMILY MEDICINE | Facility: CLINIC | Age: 47
End: 2017-10-23
Payer: COMMERCIAL

## 2017-10-23 VITALS — DIASTOLIC BLOOD PRESSURE: 70 MMHG | HEIGHT: 64 IN | SYSTOLIC BLOOD PRESSURE: 124 MMHG | HEART RATE: 70 BPM

## 2017-10-23 DIAGNOSIS — F17.200 TOBACCO USE DISORDER: ICD-10-CM

## 2017-10-23 DIAGNOSIS — B07.9 VIRAL WARTS, UNSPECIFIED TYPE: ICD-10-CM

## 2017-10-23 DIAGNOSIS — M54.6 RIGHT-SIDED THORACIC BACK PAIN, UNSPECIFIED CHRONICITY: Primary | ICD-10-CM

## 2017-10-23 DIAGNOSIS — R31.9 HEMATURIA, UNSPECIFIED TYPE: ICD-10-CM

## 2017-10-23 DIAGNOSIS — E03.9 HYPOTHYROIDISM: ICD-10-CM

## 2017-10-23 LAB
ALBUMIN UR-MCNC: NEGATIVE MG/DL
APPEARANCE UR: CLEAR
BACTERIA #/AREA URNS HPF: ABNORMAL /HPF
BILIRUB UR QL STRIP: NEGATIVE
COLOR UR AUTO: YELLOW
GLUCOSE UR STRIP-MCNC: NEGATIVE MG/DL
HGB UR QL STRIP: ABNORMAL
KETONES UR STRIP-MCNC: NEGATIVE MG/DL
LEUKOCYTE ESTERASE UR QL STRIP: NEGATIVE
NITRATE UR QL: NEGATIVE
NON-SQ EPI CELLS #/AREA URNS LPF: ABNORMAL /LPF
PH UR STRIP: 6.5 PH (ref 5–7)
RBC #/AREA URNS AUTO: ABNORMAL /HPF
SOURCE: ABNORMAL
SP GR UR STRIP: 1.01 (ref 1–1.03)
UROBILINOGEN UR STRIP-ACNC: 0.2 EU/DL (ref 0.2–1)
WBC #/AREA URNS AUTO: ABNORMAL /HPF

## 2017-10-23 PROCEDURE — 17110 DESTRUCTION B9 LES UP TO 14: CPT | Performed by: PHYSICIAN ASSISTANT

## 2017-10-23 PROCEDURE — 81001 URINALYSIS AUTO W/SCOPE: CPT | Performed by: PHYSICIAN ASSISTANT

## 2017-10-23 PROCEDURE — 99214 OFFICE O/P EST MOD 30 MIN: CPT | Performed by: PHYSICIAN ASSISTANT

## 2017-10-23 NOTE — PATIENT INSTRUCTIONS
To Schedule CT scan:  [facility] Scheduling Phone Number: [phone]  Or   [place] [facility] [facility] Scheduling Phone number: [phone]

## 2017-10-23 NOTE — MR AVS SNAPSHOT
After Visit Summary   10/23/2017    Carol Ann Anguiano    MRN: [id number]           Patient Information     Date Of Birth          1970        Visit Information        Provider Department      10/23/2017 3:40 PM Roby Charles PA-C [facility]        Today's Diagnoses     Right-sided thoracic back pain, unspecified chronicity    -  1    Hematuria, unspecified type        Tobacco use disorder          Care Instructions    To Schedule CT scan:  [facility] Scheduling Phone Number: [phone]  Or   [facility] [facility] Scheduling Phone number: [phone]           Follow-ups after your visit        Future tests that were ordered for you today     Open Future Orders        Priority Expected Expires Ordered    CT Abdomen Pelvis w Contrast Routine  10/23/2018 10/23/2017            Who to contact     If you have questions or need follow up information about today's clinic visit or your schedule please contact [facility] directly at [phone].  Normal or non-critical lab and imaging results will be communicated to you by [email or web address], letter or phone within 4 business days after the clinic has received the results. If you do not hear from us within 7 days, please contact the clinic through [employer] or phone. If you have a critical or abnormal lab result, we will notify you by phone as soon as possible.  Submit refill requests through [employer] or call your pharmacy and they will forward the refill request to us. Please allow 3 business days for your refill to be completed.          Additional Information About Your Visit        [email or web address] Information     [employer] gives you secure access to your electronic health record. If you see a primary care provider, you can also send messages to your care team and make appointments. If you have questions, please call your primary care clinic.  If you do not have a primary care provider,  "please call [phone] and they will assist you.        Care EveryWhere ID     This is your Care EveryWhere ID. This could be used by other organizations to access your [place] medical records  [id number]        Your Vitals Were     Pulse Height Last Period             70 5' 4\" (1.626 m) 09/30/2017 (Exact Date)          Blood Pressure from Last 3 Encounters:   10/23/17 124/70   10/06/17 108/72   06/09/17 108/73    Weight from Last 3 Encounters:   10/06/17 174 lb (78.9 kg)   06/09/17 177 lb (80.3 kg)   05/05/17 174 lb 12.8 oz (79.3 kg)              We Performed the Following     *UA reflex to Microscopic and Culture ([place] [facility] (except [place] and [place])        Primary Care Provider Office Phone # Fax #    Roby Charles PA-C [phone]       [address] [facility] [address]        Equal Access to Services     CELESTE ALFORD AH: Hadii aad ku hadasho Soomaali, waaxda luqadaha, qaybta kaalmada adeegyada, waxay idiin haysergion raisa vanegasaradavid hooper . So [facility] [phone].    ATENCIÓN: Si habla español, tiene a duran disposición servicios gratuitos de asistencia lingüística. Llame al [phone].    We comply with applicable federal civil rights laws and [place] laws. We do not discriminate on the basis of race, color, national origin, age, disability, sex, sexual orientation, or gender identity.            Thank you!     Thank you for choosing [facility]  for your care. Our goal is always to provide you with excellent care. Hearing back from our patients is one way we can continue to improve our services. Please take a few minutes to complete the written survey that you may receive in the mail after your visit with us. Thank you!             Your Updated Medication List - Protect others around you: Learn how to safely use, store and throw away your medicines at www.disposemymeds.org.          This list is accurate as of: 10/23/17  4:13 PM.  Always use " your most recent med list.                   Brand Name Dispense Instructions for use Diagnosis    albuterol 108 (90 BASE) MCG/ACT Inhaler    PROAIR HFA/PROVENTIL HFA/VENTOLIN HFA    1 Inhaler    Inhale 2 puffs into the lungs every 6 hours as needed for shortness of breath / dyspnea or wheezing    Intermittent asthma, uncomplicated       levothyroxine 200 MCG tablet    SYNTHROID/LEVOTHROID    90 tablet    Take 1 tablet (200 mcg) by mouth daily    Hypothyroidism       omeprazole 40 MG capsule    priLOSEC    90 capsule    Take 1 capsule (40 mg) by mouth daily Take 30-60 minutes before a meal.    Nausea

## 2017-10-23 NOTE — PROGRESS NOTES
SUBJECTIVE:   Carol Ann Anguiano is a 47 year old female who presents to clinic today for the following health issues:    Patient is here to discuss recent results drawn on 10/6/17.    WART(S)  Onset: 2 months, patient reports that Gurjit Charles saw this wart at last visit    Description:   Location: abdomen  Number of warts: 1  Painful: no, but it catches on things    Accompanying Signs & Symptoms:  Signs of infection: no     History:   History of trauma: no   Prior warts: YES- when younger, on hands    Therapies Tried and outcome: none    Here to discuss lab results as well. Had fair amount of hematuria noted in UA without association with her menstrual flow. Still having right side and upper back pain - she points to her right flank. It is vaguely colicky but is more achy and sore. Denies other symptoms.    Patient Active Problem List   Diagnosis     Mild intermittent asthma     CARDIOVASCULAR SCREENING; LDL GOAL LESS THAN 160     Globus pharyngeus     Tobacco use disorder     Hypothyroidism, unspecified hypothyroidism type     Abnormal uterine bleeding     JONATHON I (cervical intraepithelial neoplasia I)      Current Outpatient Prescriptions   Medication     albuterol (PROAIR HFA/PROVENTIL HFA/VENTOLIN HFA) 108 (90 BASE) MCG/ACT Inhaler     omeprazole (PRILOSEC) 40 MG capsule     levothyroxine (SYNTHROID/LEVOTHROID) 200 MCG tablet     No current facility-administered medications for this visit.         Problem list and histories reviewed & adjusted, as indicated.  Additional history: as documented    Labs reviewed in EPIC    Reviewed and updated as needed this visit by clinical staffTobacco  Allergies  Meds  Med Hx  Surg Hx  Fam Hx  Soc Hx      Reviewed and updated as needed this visit by Provider         ROS:  Constitutional, HEENT, cardiovascular, pulmonary, GI, , musculoskeletal, neuro, skin, endocrine and psych systems are negative, except as otherwise noted.      OBJECTIVE:   /70  Pulse 70  Ht  "5' 4\" (1.626 m)  LMP 09/30/2017 (Exact Date)  There is no height or weight on file to calculate BMI.  GENERAL: healthy, alert and no distress  HENT: ear canals and TM's normal, nose and mouth without ulcers or lesions  NECK: no adenopathy, no asymmetry, masses, or scars and thyroid normal to palpation  RESP: lungs clear to auscultation - no rales, rhonchi or wheezes  CV: regular rate and rhythm, normal S1 S2, no S3 or S4, no murmur, click or rub, no peripheral edema and peripheral pulses strong  ABDOMEN: soft, nontender, no hepatosplenomegaly, no masses and bowel sounds normal  MS: She is tender over the right lateral chest wall and flank   SKIN: right lower abdomen typical viral wart, 4 mm diameter, otherwise no suspicious lesions or rashes      ASSESSMENT/PLAN:       ICD-10-CM    1. Right-sided thoracic back pain, unspecified chronicity M54.6 CT Abdomen Pelvis w Contrast     *UA reflex to Microscopic and Culture ([facility] (except [place] and [place])     Urine Microscopic   2. Hematuria, unspecified type R31.9 CT Abdomen Pelvis w Contrast     *UA reflex to Microscopic and Culture ([facility] (except [place] and [place])   3. Tobacco use disorder F17.200 CT Abdomen Pelvis w Contrast   4. Viral warts, unspecified type B07.9 DESTRUCT BENIGN LESION, UP TO 14      Reviewed. I think her risk factors for stone or renal cause for hematuria are high enough and considering her right side pain, I want to image her with CT - renal protocol to evaluate renal system down to the bladder. I discussed this with her and she agrees.    I also reviewed her other labs that we ran - things appear overall fine though her B12 was lower end of normal scale. She is taking oral vitamin B12    Skin - right lower abdomen, viral wart vs less likely a keratosis of some sort - reviewed options and she requests cryotherapy. The wart was freeze/ thaw x 3 cycles and she tolerated that well.   ADELAIDE RICHEY" DARION ULLOA  [facility]

## 2017-10-23 NOTE — NURSING NOTE
"Chief Complaint   Patient presents with     Results     Wart     on abodomen, reports shanice goodwin saw it at last visit       Initial /70  Pulse 70  Ht 5' 4\" (1.626 m)  LMP 09/30/2017 (Exact Date) Estimated body mass index is 29.87 kg/(m^2) as calculated from the following:    Height as of 10/6/17: 5' 4\" (1.626 m).    Weight as of 10/6/17: 174 lb (78.9 kg).  Medication Reconciliation: complete   Maegan Sage MA      "

## 2017-10-26 RX ORDER — LEVOTHYROXINE SODIUM 200 UG/1
TABLET ORAL
Qty: 90 TABLET | Refills: 1 | Status: SHIPPED | OUTPATIENT
Start: 2017-10-26 | End: 2018-08-13

## 2017-10-27 ENCOUNTER — RADIANT APPOINTMENT (OUTPATIENT)
Dept: CT IMAGING | Facility: CLINIC | Age: 47
End: 2017-10-27
Attending: PHYSICIAN ASSISTANT
Payer: COMMERCIAL

## 2017-10-27 DIAGNOSIS — M54.6 RIGHT-SIDED THORACIC BACK PAIN, UNSPECIFIED CHRONICITY: ICD-10-CM

## 2017-10-27 DIAGNOSIS — F17.200 TOBACCO USE DISORDER: ICD-10-CM

## 2017-10-27 DIAGNOSIS — R31.9 HEMATURIA, UNSPECIFIED TYPE: ICD-10-CM

## 2017-10-27 PROCEDURE — 74177 CT ABD & PELVIS W/CONTRAST: CPT | Mod: TC

## 2017-10-27 RX ORDER — IOPAMIDOL 755 MG/ML
95 INJECTION, SOLUTION INTRAVASCULAR ONCE
Status: COMPLETED | OUTPATIENT
Start: 2017-10-27 | End: 2017-10-27

## 2017-10-27 RX ADMIN — IOPAMIDOL 95 ML: 755 INJECTION, SOLUTION INTRAVASCULAR at 14:49

## 2017-11-13 ENCOUNTER — TELEPHONE (OUTPATIENT)
Dept: FAMILY MEDICINE | Facility: CLINIC | Age: 47
End: 2017-11-13

## 2017-11-13 ENCOUNTER — OFFICE VISIT (OUTPATIENT)
Dept: FAMILY MEDICINE | Facility: CLINIC | Age: 47
End: 2017-11-13
Payer: COMMERCIAL

## 2017-11-13 VITALS
HEIGHT: 64 IN | HEART RATE: 68 BPM | DIASTOLIC BLOOD PRESSURE: 74 MMHG | SYSTOLIC BLOOD PRESSURE: 106 MMHG | TEMPERATURE: 98.5 F

## 2017-11-13 DIAGNOSIS — D23.9 BENIGN NEOPLASM OF SKIN, UNSPECIFIED LOCATION: ICD-10-CM

## 2017-11-13 DIAGNOSIS — N20.0 RIGHT KIDNEY STONE: ICD-10-CM

## 2017-11-13 DIAGNOSIS — M54.6 RIGHT-SIDED THORACIC BACK PAIN, UNSPECIFIED CHRONICITY: ICD-10-CM

## 2017-11-13 DIAGNOSIS — R31.9 HEMATURIA, UNSPECIFIED TYPE: Primary | ICD-10-CM

## 2017-11-13 PROCEDURE — 99214 OFFICE O/P EST MOD 30 MIN: CPT | Mod: 25 | Performed by: PHYSICIAN ASSISTANT

## 2017-11-13 PROCEDURE — 17110 DESTRUCTION B9 LES UP TO 14: CPT | Performed by: PHYSICIAN ASSISTANT

## 2017-11-13 NOTE — MR AVS SNAPSHOT
After Visit Summary   11/13/2017    Carol Ann Anguiano    MRN: [id number]           Patient Information     Date Of Birth          1970        Visit Information        Provider Department      11/13/2017 4:00 PM Roby Charles PA-C [facility]        Today's Diagnoses     Hematuria, unspecified type    -  1    Right kidney stone        Right-sided thoracic back pain, unspecified chronicity           Follow-ups after your visit        Additional Services     [facility] PT, HAND, AND CHIROPRACTIC REFERRAL       **This order will print in the [facility] Scheduling Office**    Physical Therapy, Hand Therapy and Chiropractic Care are available through:    *[facility]  *[facility]  *[facility]    Call one number to schedule at any of the above locations: [phone].    Your provider has referred you to: [facility] - PT and/or Chiropractic Care determined by clinical presentation at [facility] or [facility] Initial Visit    Indication/Reason for Referral: Right side / rib pain - question rib / area of pain   Onset of Illness: Chronic   Therapy Orders: Evaluate and Treat  Special Programs: None  Special Request: None    Damien Quezada      Additional Comments for the Therapist or Chiropractor: Thanks     Please be aware that coverage of these services is subject to the terms and limitations of your health insurance plan.  Call member services at your health plan with any benefit or coverage questions.      Please bring the following to your appointment:    *Your personal calendar for scheduling future appointments  *Comfortable clothing            UROLOGY ADULT REFERRAL       Your provider has referred you to: [facility]: Kidney Stone Program - [place] [phone]   [email or web address]    Please be aware that coverage of these services is subject to the terms and limitations of your health  "insurance plan.  Call member services at your health plan with any benefit or coverage questions.      Please bring the following with you to your appointment:    (1) Any X-Rays, CTs or MRIs which have been performed.  Contact the facility where they were done to arrange for  prior to your scheduled appointment.    (2) List of current medications  (3) This referral request   (4) Any documents/labs given to you for this referral                  Who to contact     If you have questions or need follow up information about today's clinic visit or your schedule please contact [facility] directly at [phone].  Normal or non-critical lab and imaging results will be communicated to you by MyChart, letter or phone within 4 business days after the clinic has received the results. If you do not hear from us within 7 days, please contact the clinic through [facility] or phone. If you have a critical or abnormal lab result, we will notify you by phone as soon as possible.  Submit refill requests through Mobile Messenger or call your pharmacy and they will forward the refill request to us. Please allow 3 business days for your refill to be completed.          Additional Information About Your Visit        MyChart Information     Mobile Messenger gives you secure access to your electronic health record. If you see a primary care provider, you can also send messages to your care team and make appointments. If you have questions, please call your primary care clinic.  If you do not have a primary care provider, please call [phone] and they will assist you.        Care EveryWhere ID     This is your Care EveryWhere ID. This could be used by other organizations to access your [facility] medical records  [id number]        Your Vitals Were     Pulse Temperature Height             68 98.5  F (36.9  C) (Oral) 5' 4\" (1.626 m)          Blood Pressure from Last 3 Encounters:   11/13/17 106/74   10/23/17 124/70   10/06/17 " 108/72    Weight from Last 3 Encounters:   10/06/17 174 lb (78.9 kg)   06/09/17 177 lb (80.3 kg)   05/05/17 174 lb 12.8 oz (79.3 kg)              We Performed the Following     [facility] PT, HAND, AND CHIROPRACTIC REFERRAL     UROLOGY ADULT REFERRAL        Primary Care Provider Office Phone # Fax #    Roby Thor Charles PA-C [phone]       [address] [address]        Equal Access to Services     CELESTE [facility] CenterAVNI : Hadii aad ku hadasho Soomaali, waaxda luqadaha, qaybta kaalmada adeegyada, waxay idiin hayaan adeeg khkristin hooper . So [facility] [phone].    ATENCIÓN: Si habla español, tiene a duran disposición servicios gratuitos de asistencia lingüística. Llame al [phone].    We comply with applicable federal civil rights laws and [place] laws. We do not discriminate on the basis of race, color, national origin, age, disability, sex, sexual orientation, or gender identity.            Thank you!     Thank you for choosing [facility]  for your care. Our goal is always to provide you with excellent care. Hearing back from our patients is one way we can continue to improve our services. Please take a few minutes to complete the written survey that you may receive in the mail after your visit with us. Thank you!             Your Updated Medication List - Protect others around you: Learn how to safely use, store and throw away your medicines at www.disposemymeds.org.          This list is accurate as of: 11/13/17  5:02 PM.  Always use your most recent med list.                   Brand Name Dispense Instructions for use Diagnosis    albuterol 108 (90 BASE) MCG/ACT Inhaler    PROAIR HFA/PROVENTIL HFA/VENTOLIN HFA    1 Inhaler    Inhale 2 puffs into the lungs every 6 hours as needed for shortness of breath / dyspnea or wheezing    Intermittent asthma, uncomplicated       levothyroxine 200 MCG tablet    SYNTHROID/LEVOTHROID    90 tablet    TAKE 1 TABLET(200 MCG) BY MOUTH  DAILY    Hypothyroidism       omeprazole 40 MG capsule    priLOSEC    90 capsule    Take 1 capsule (40 mg) by mouth daily Take 30-60 minutes before a meal.    Nausea

## 2017-11-13 NOTE — NURSING NOTE
"Chief Complaint   Patient presents with     Forms     Follow up on LA      CT Results     Wart       Initial /74 (BP Location: Right arm, Cuff Size: Adult Regular)  Pulse 68  Temp 98.5  F (36.9  C) (Oral)  Ht 5' 4\" (1.626 m) Estimated body mass index is 29.87 kg/(m^2) as calculated from the following:    Height as of 10/6/17: 5' 4\" (1.626 m).    Weight as of 10/6/17: 174 lb (78.9 kg).  Medication Reconciliation: complete     Keiko Crouch CMA (AAMA)      "

## 2017-11-13 NOTE — TELEPHONE ENCOUNTER
FMLA forms completed at patient's visit.    Copies made for patient's chart.    Jayde Falcon, Certified Medical Assistant (AAMA)

## 2017-11-13 NOTE — PROGRESS NOTES
"  SUBJECTIVE:   Carol Ann Anguiano is a 47 year old female who presents to clinic today for the following health issues:    Patient is here to follow up on CT scan results and another treatment of the wart on her abdomin.    Continued right sided abdominal wall pain. Achy, sore, dull. She had a small stone found on CT scan and some hematuria noted on UA. She did also have some possible mild hydronephrosis. She denies other symptoms.    Right abdominal lesion has mostly fallen off but some mild residual remains.    Patient Active Problem List   Diagnosis     Mild intermittent asthma     CARDIOVASCULAR SCREENING; LDL GOAL LESS THAN 160     Globus pharyngeus     Tobacco use disorder     Hypothyroidism, unspecified hypothyroidism type     Abnormal uterine bleeding     JONATHON I (cervical intraepithelial neoplasia I)      Current Outpatient Prescriptions   Medication     levothyroxine (SYNTHROID/LEVOTHROID) 200 MCG tablet     albuterol (PROAIR HFA/PROVENTIL HFA/VENTOLIN HFA) 108 (90 BASE) MCG/ACT Inhaler     omeprazole (PRILOSEC) 40 MG capsule     No current facility-administered medications for this visit.       Problem list and histories reviewed & adjusted, as indicated.  Additional history: as documented    Labs reviewed in EPIC    Reviewed and updated as needed this visit by clinical staff       Reviewed and updated as needed this visit by Provider         ROS:  Constitutional, HEENT, cardiovascular, pulmonary, gi and gu systems are negative, except as otherwise noted.      OBJECTIVE:   /74 (BP Location: Right arm, Cuff Size: Adult Regular)  Pulse 68  Temp 98.5  F (36.9  C) (Oral)  Ht 5' 4\" (1.626 m)  There is no height or weight on file to calculate BMI.  GENERAL: healthy, alert and no distress  ABDOMEN: soft, nontender, no hepatosplenomegaly, no masses and bowel sounds normal  MS: Mild right side chest wall discomfort to deep palpation, otherwise no gross musculoskeletal defects noted, no edema  SKIN: Right " anterior abdomen there is a keratotic erythematous post inflammatory papule about 6 mm in diameter     ASSESSMENT/PLAN:     (R31.9) Hematuria, unspecified type  (primary encounter diagnosis)  Comment:   Plan: UROLOGY ADULT REFERRAL        Reviewed - will refer to urology to review the CT findings and determine if stone retrieval is indicated and to review hydronephrosis    (N20.0) Right kidney stone  Comment:   Plan: UROLOGY ADULT REFERRAL        As noted     (M54.6) Right-sided thoracic back pain, unspecified chronicity  Comment:   Plan: UROLOGY ADULT REFERRAL, [facility] PT, HAND, AND         CHIROPRACTIC REFERRAL        Unclear - her exam is reassuring, her CT scan shows the stone, I'm not convinced this is related, I think more of a spasm / muscular issue. Refer to PT     (D23.9) Benign neoplasm of skin, unspecified location  Comment:   Plan: DESTRUCT BENIGN LESION, UP TO 14        Reviewed options. Is clearing. Applied freeze/thaw x 3 deep cycles again. Follow as needed    ADELAIDE ULLOA PA-C  [facility]

## 2017-11-14 ASSESSMENT — ASTHMA QUESTIONNAIRES: ACT_TOTALSCORE: 23

## 2017-11-30 ENCOUNTER — PRE VISIT (OUTPATIENT)
Dept: UROLOGY | Facility: CLINIC | Age: 47
End: 2017-11-30

## 2018-01-03 ENCOUNTER — TELEPHONE (OUTPATIENT)
Dept: UROLOGY | Facility: CLINIC | Age: 48
End: 2018-01-03

## 2018-01-04 ASSESSMENT — ENCOUNTER SYMPTOMS
STIFFNESS: 1
SHORTNESS OF BREATH: 1
SYNCOPE: 0
HYPOTENSION: 0
COUGH DISTURBING SLEEP: 1
SMELL DISTURBANCE: 0
LEG PAIN: 1
COUGH: 1
WEIGHT GAIN: 1
WHEEZING: 1
SLEEP DISTURBANCES DUE TO BREATHING: 0
JOINT SWELLING: 0
DEPRESSION: 0
DECREASED CONCENTRATION: 1
NECK PAIN: 1
NERVOUS/ANXIOUS: 1
DYSPNEA ON EXERTION: 0
DECREASED APPETITE: 1
MYALGIAS: 1
INCREASED ENERGY: 1
HOARSE VOICE: 1
BACK PAIN: 1
HALLUCINATIONS: 0
MUSCLE WEAKNESS: 1
POLYDIPSIA: 1
ORTHOPNEA: 0
LIGHT-HEADEDNESS: 0
NECK MASS: 0
PALPITATIONS: 0
POLYPHAGIA: 0
HEMOPTYSIS: 0
TROUBLE SWALLOWING: 0
ALTERED TEMPERATURE REGULATION: 0
FATIGUE: 1
MUSCLE CRAMPS: 0
CHILLS: 0
HYPERTENSION: 0
INSOMNIA: 0
SINUS CONGESTION: 1
PANIC: 0
SPUTUM PRODUCTION: 1
SINUS PAIN: 1
POSTURAL DYSPNEA: 1
TASTE DISTURBANCE: 0
EXERCISE INTOLERANCE: 0
SNORES LOUDLY: 0
FEVER: 0
ARTHRALGIAS: 1
SORE THROAT: 1
NIGHT SWEATS: 0

## 2018-01-05 ENCOUNTER — OFFICE VISIT (OUTPATIENT)
Dept: UROLOGY | Facility: CLINIC | Age: 48
End: 2018-01-05
Payer: COMMERCIAL

## 2018-01-05 VITALS
DIASTOLIC BLOOD PRESSURE: 77 MMHG | SYSTOLIC BLOOD PRESSURE: 120 MMHG | WEIGHT: 172.8 LBS | HEIGHT: 64 IN | HEART RATE: 60 BPM | BODY MASS INDEX: 29.5 KG/M2

## 2018-01-05 DIAGNOSIS — N20.0 NEPHROLITHIASIS: Primary | ICD-10-CM

## 2018-01-05 LAB
ALBUMIN UR-MCNC: 10 MG/DL
APPEARANCE UR: CLEAR
BILIRUB UR QL STRIP: NEGATIVE
COLOR UR AUTO: YELLOW
GLUCOSE UR STRIP-MCNC: NEGATIVE MG/DL
HGB UR QL STRIP: ABNORMAL
KETONES UR STRIP-MCNC: 5 MG/DL
LEUKOCYTE ESTERASE UR QL STRIP: NEGATIVE
NITRATE UR QL: NEGATIVE
PH UR STRIP: 6 PH (ref 5–7)
RBC #/AREA URNS AUTO: 0 /HPF (ref 0–2)
SOURCE: ABNORMAL
SP GR UR STRIP: 1.03 (ref 1–1.03)
UROBILINOGEN UR STRIP-MCNC: 0.2 MG/DL (ref 0–2)
WBC #/AREA URNS AUTO: 0 /HPF (ref 0–2)

## 2018-01-05 RX ORDER — OXYCODONE AND ACETAMINOPHEN 5; 325 MG/1; MG/1
1-2 TABLET ORAL
COMMUNITY
Start: 2017-04-21 | End: 2018-03-13

## 2018-01-05 RX ORDER — HYDROCORTISONE ACETATE 25 MG
SUPPOSITORY, RECTAL RECTAL DAILY PRN
Refills: 0 | COMMUNITY
Start: 2017-02-20 | End: 2020-02-26

## 2018-01-05 ASSESSMENT — PAIN SCALES - GENERAL: PAINLEVEL: NO PAIN (0)

## 2018-01-05 NOTE — LETTER
1/5/2018       RE: Carol Ann Anguiano  [address] [place] [address]     Dear Colleague,    Thank you for referring your patient, Carol Ann Anguiano, to the [facility] UROLOGY AND INST FOR PROSTATE AND UROLOGIC CANCERS at [facility]. Please see a copy of my visit note below.    ASSESSMENT and PLAN     1-2mm right UVJ Stone:    During counseling for this visit, we covered the natural history of kidney stones, the risk of progression to symptomatic pain/infection, and the possibility of renal failure/kidney damage.  We covered treatment options including observation, ureteroscopy, extracorporeal shock wave lithotripsy (ESWL), and percutaneous nephrolithotomy (PCNL).  We covered surgical risks which include but are not limited to heart attack, stroke, blood clot in the legs or lungs, death, injury to surrounding organs (intestine, liver, spleen, pancreas, lung, muscles, nerves), loss of sensation around incisions, decreased renal function, infection, injury to ureter, injury to bladder, and urethral strictures.  Additional procedures may be necessary in the perioperative period.    Patient feels she has not yet passed the stone and elected to proceed with ureteroscopy. We will update a CT Abd/Pelvis in the near future.    Hematuria:  Pt reports no recent episodes of hematuria, but we will obtain a urine culture and urine analysis  ____________________________________________________________________    CHIEF COMPLAINT  It was my pleasure to see Carol Ann Anguiano who is a 47 year old female here for evaluation of kidney stones.    HPI  Ms Anguiano is referred by Roby Charles for a 1-2 mm stone at the right UVJ with mild associated hydronephrosis. This was noted on CT done ion 10/27/2017 for workup of complaints of flank pain, abdominal pain, and gross hematuria.      Today she states she is not having pain. Her most recent pain episode was 2 weeks ago. When she has pain, it is  intermittent and lasts 1-2 days at a time. At onset, the pain is sharp, but gradually becomes a dull pain. The pain is localized in the right flank and does not radiate elsewhere.    She denies any personal history of kidney stones prior to this episode. She does report strong family history of kidney stone (multiple members of the family).     RADIOLOGIC IMAGING  CT ABDOMEN AND PELVIS WITH CONTRAST 10/27/2017 3:03 PM   IMPRESSION:    1. Possible tiny 1-2 mm stone near the right UVJ causing mild right  hydronephrosis. No other urinary tract calculi. Follow-up as  clinically warranted.  2. No renal cyst or mass. No left hydronephrosis or urinary tract  calculi. Bladder is unremarkable.  3. Cholecystectomy changes.    I reviewed the recent radiologic imaging and reports described above.    PMH  Noted for hypothyroidism, hyperlipidemia, asthma    PSH  Umbilical hernia repair April 2017, lap debbie 2008, D&C April 2017    Current meds  Levothyroxine, albuterol, Prilosec    Family history  Negative for urologic disorders    Social history  Current 1 pack per day smoker, minimal alcohol use    PHYSICAL EXAM  Constitutional: Alert, no acute distress  Psychiatric: Normal mood and affect  Head: Normocephalic.  Neck: Neck supple. No adenopathy. Thyroid symmetric, normal size  ENT: Oropharynx clear.  Back: No spinal tenderness.  No costovertebral angle tenderness  Cardiovascular: RRR. No murmurs, clicks gallops or rub  Respiratory: Good diaphragmatic excursion. Lungs clear  Gastrointestinal: Abdomen soft, non-tender. No masses, organomegaly. No hernia  Skin: no suspicious lesions or rashes on abdomen  Extremities: No lower extremity edema.  No clubbing or cyanosis.  Neurologic: Cranial nerves grossly intact.  Equal strength and sensation on bilateral extremities.   : Deferred     Recent labs  Hgb 15.8    Kendall GREENWOOD, reviewed these laboratory values.    Scribe Disclosure:   Ady GREENWOOD, am serving as a scribe;  to document services personally performed by Kendall Gracia MD -based on data collection and the provider's statements to me.     Provider Disclosure:  I agree with above History, Review of Systems, Physical exam and Plan.  I have reviewed the content of the documentation and have edited it as needed. I have personally performed the services documented here and the documentation accurately represents those services and the decisions I have made.      Electronically signed by:  Kendall Gracia MD      Patient Care Team:  Roby Charles PA-C as PCP - General (Family Practice)  Bro Gracia MD as MD (Urology)  Miriam Frazier, RN as Registered Nurse  ROBY CHARLES    Copy to patient  BOY SUAREZ  [address] [address]

## 2018-01-05 NOTE — PATIENT INSTRUCTIONS
Urine testing today.    Schedule appointment for CT scan and follow up with Dr. Gracia in about 4-6 weeks.    It was a pleasure meeting with you today.  Thank you for allowing me and my team the privilege of caring for you today.  YOU are the reason we are here, and I truly hope we provided you with the excellent service you deserve.  Please let us know if there is anything else we can do for you so that we can be sure you are leaving completely satisfied with your care experience.      OMARI Hernandez

## 2018-01-05 NOTE — PROGRESS NOTES
ASSESSMENT and PLAN     1-2mm right UVJ Stone:    During counseling for this visit, we covered the natural history of kidney stones, the risk of progression to symptomatic pain/infection, and the possibility of renal failure/kidney damage.  We covered treatment options including observation, ureteroscopy, extracorporeal shock wave lithotripsy (ESWL), and percutaneous nephrolithotomy (PCNL).  We covered surgical risks which include but are not limited to heart attack, stroke, blood clot in the legs or lungs, death, injury to surrounding organs (intestine, liver, spleen, pancreas, lung, muscles, nerves), loss of sensation around incisions, decreased renal function, infection, injury to ureter, injury to bladder, and urethral strictures.  Additional procedures may be necessary in the perioperative period.    Patient feels she has not yet passed the stone and elected to proceed with ureteroscopy. We will update a CT Abd/Pelvis in the near future.    Hematuria:  Pt reports no recent episodes of hematuria, but we will obtain a urine culture and urine analysis  ____________________________________________________________________    CHIEF COMPLAINT  It was my pleasure to see Carol Ann Anguiano who is a 47 year old female here for evaluation of kidney stones.    HPI  Ms Anguiano is referred by Roby Charles for a 1-2 mm stone at the right UVJ with mild associated hydronephrosis. This was noted on CT done ion 10/27/2017 for workup of complaints of flank pain, abdominal pain, and gross hematuria.      Today she states she is not having pain. Her most recent pain episode was 2 weeks ago. When she has pain, it is intermittent and lasts 1-2 days at a time. At onset, the pain is sharp, but gradually becomes a dull pain. The pain is localized in the right flank and does not radiate elsewhere.    She denies any personal history of kidney stones prior to this episode. She does report strong family history of kidney stone (multiple  members of the family).     RADIOLOGIC IMAGING  CT ABDOMEN AND PELVIS WITH CONTRAST 10/27/2017 3:03 PM   IMPRESSION:    1. Possible tiny 1-2 mm stone near the right UVJ causing mild right  hydronephrosis. No other urinary tract calculi. Follow-up as  clinically warranted.  2. No renal cyst or mass. No left hydronephrosis or urinary tract  calculi. Bladder is unremarkable.  3. Cholecystectomy changes.    I reviewed the recent radiologic imaging and reports described above.    PMH  Noted for hypothyroidism, hyperlipidemia, asthma    PSH  Umbilical hernia repair April 2017, lap debbie 2008, D&C April 2017    Current meds  Levothyroxine, albuterol, Prilosec    Family history  Negative for urologic disorders    Social history  Current 1 pack per day smoker, minimal alcohol use    PHYSICAL EXAM  Constitutional: Alert, no acute distress  Psychiatric: Normal mood and affect  Head: Normocephalic.  Neck: Neck supple. No adenopathy. Thyroid symmetric, normal size  ENT: Oropharynx clear.  Back: No spinal tenderness.  No costovertebral angle tenderness  Cardiovascular: RRR. No murmurs, clicks gallops or rub  Respiratory: Good diaphragmatic excursion. Lungs clear  Gastrointestinal: Abdomen soft, non-tender. No masses, organomegaly. No hernia  Skin: no suspicious lesions or rashes on abdomen  Extremities: No lower extremity edema.  No clubbing or cyanosis.  Neurologic: Cranial nerves grossly intact.  Equal strength and sensation on bilateral extremities.   : Deferred     Recent labs  Hgb 15.8    IKendall, reviewed these laboratory values.    Scribe Disclosure:   Ady GREENWOOD, am serving as a scribe; to document services personally performed by Kendall Gracia MD -based on data collection and the provider's statements to me.     Provider Disclosure:  I agree with above History, Review of Systems, Physical exam and Plan.  I have reviewed the content of the documentation and have edited it as needed. I have  personally performed the services documented here and the documentation accurately represents those services and the decisions I have made.      Electronically signed by:  Kendall Gracia MD    CC  Patient Care Team:  Roby Charles PA-C as PCP - General (Family Practice)  Bro Gracia MD as MD (Urology)  Miriam Frazier, RN as Registered Nurse  ROBY CHARLES    Copy to patient  BOY SUAREZ  [address] [facility] [address] [address]

## 2018-01-05 NOTE — MR AVS SNAPSHOT
After Visit Summary   1/5/2018    Carol Ann Anguiano    MRN: [id number]           Patient Information     Date Of Birth          1970        Visit Information        Provider Department      1/5/2018 9:20 AM Bro Gracia MD [facility] Urology and [facility] for Prostate and Urologic Cancers        Today's Diagnoses     Nephrolithiasis    -  1      Care Instructions    Urine testing today.    Schedule appointment for CT scan and follow up with Dr. Gracia in about 4-6 weeks.    It was a pleasure meeting with you today.  Thank you for allowing me and my team the privilege of caring for you today.  YOU are the reason we are here, and I truly hope we provided you with the excellent service you deserve.  Please let us know if there is anything else we can do for you so that we can be sure you are leaving completely satisfied with your care experience.      Lois La GORAN          Follow-ups after your visit        Your next 10 appointments already scheduled     Feb 02, 2018  2:20 PM CST   CT ABDOMEN PELVIS W/O CONTRAST with UCCT2   [facility] Imaging Center CT ([facility])    [address] [address] [phone]           Please bring any scans or X-rays taken at other hospitals, if similar tests were done. Also bring a list of your medicines, including vitamins, minerals and over-the-counter drugs. It is safest to leave personal items at home.  Be sure to tell your doctor:   If you have any allergies.   If there s any chance you are pregnant.   If you are breastfeeding.   If you have any special needs.  You may have contrast for this exam. To prepare:   Do not eat or drink for 2 hours before your exam. If you need to take medicine, you may take it with small sips of water. (We may ask you to take liquid medicine as well.)   The day before your exam, drink extra fluids at least six 8-ounce glasses (unless your doctor tells you to  restrict your fluids).  Patients over 70 or patients with diabetes or kidney problems:   If you haven t had a blood test (creatinine test) within the last 30 days, go to your clinic or Diagnostic Imaging Department for this test.  If you have diabetes:   If your kidney function is normal, continue taking your metformin (Avandamet, Glucophage, Glucovance, Metaglip) on the day of your exam.   If your kidney function is abnormal, wait 48 hours before restarting this medicine.  You will have oral contrast for this exam:   You will drink the contrast at home. Get this from your clinic or Diagnostic Imaging Department. Please follow the directions given.  Please wear loose clothing, such as a sweat suit or jogging clothes. Avoid snaps, zippers and other metal. We may ask you to undress and put on a hospital gown.  If you have any questions, please call the Imaging Department where you will have your exam.            Feb 02, 2018  3:00 PM CST   (Arrive by 2:45 PM)   Return Renal Calculi with Bro Gracia MD   [facility] Urology and [facility] for Prostate and Urologic Cancers ([email or web address] and Surgery Center)    [address] [place] [address] [phone]              Future tests that were ordered for you today     Open Future Orders        Priority Expected Expires Ordered    CT Abdomen Pelvis w/o Contrast Routine  1/4/2019 1/5/2018            Who to contact     Please call your clinic at [phone] to:    Ask questions about your health    Make or cancel appointments    Discuss your medicines    Learn about your test results    Speak to your doctor   If you have compliments or concerns about an experience at your clinic, or if you wish to file a complaint, please contact [facility] Patient Relations at [phone] or email us at [email or web address]         Additional Information About Your Visit        MyChart Information     [employer] gives you  "secure access to your electronic health record. If you see a primary care provider, you can also send messages to your care team and make appointments. If you have questions, please call your primary care clinic.  If you do not have a primary care provider, please call [phone] and they will assist you.      [employer] is an electronic gateway that provides easy, online access to your medical records. With [employer], you can request a clinic appointment, read your test results, renew a prescription or communicate with your care team.     To access your existing account, please contact your [facility] or call [phone] for assistance.        Care EveryWhere ID     This is your Care EveryWhere ID. This could be used by other organizations to access your [facility] medical records  [id number]        Your Vitals Were     Pulse Height BMI (Body Mass Index)             60 1.626 m (5' 4\") 29.66 kg/m2          Blood Pressure from Last 3 Encounters:   01/05/18 120/77   11/13/17 106/74   10/23/17 124/70    Weight from Last 3 Encounters:   01/05/18 78.4 kg (172 lb 12.8 oz)   10/06/17 78.9 kg (174 lb)   06/09/17 80.3 kg (177 lb)              We Performed the Following     Routine UA with micro reflex to culture        Primary Care Provider Office Phone # Fax #    Roby Charles PA-C [phone]       [facility] [address]        Equal Access to Services     CELESTE ALFORD : Hadii yasmine ku hadasho Soomaali, waaxda luqadaha, qaybta kaalmada adeegyada, marci hooper . So [facility] [phone].    ATENCIÓN: Si habla español, tiene a duran disposición servicios gratuitos de asistencia lingüística. Llmarlene al [phone].    We comply with applicable federal civil rights laws and [place] laws. We do not discriminate on the basis of race, color, national origin, age, disability, sex, sexual orientation, or gender identity.            Thank you!  "    Thank you for choosing [facility] UROLOGY AND [facility] FOR PROSTATE AND UROLOGIC CANCERS  for your care. Our goal is always to provide you with excellent care. Hearing back from our patients is one way we can continue to improve our services. Please take a few minutes to complete the written survey that you may receive in the mail after your visit with us. Thank you!             Your Updated Medication List - Protect others around you: Learn how to safely use, store and throw away your medicines at www.disposemymeds.org.          This list is accurate as of: 1/5/18 10:21 AM.  Always use your most recent med list.                   Brand Name Dispense Instructions for use Diagnosis    albuterol 108 (90 BASE) MCG/ACT Inhaler    PROAIR HFA/PROVENTIL HFA/VENTOLIN HFA    1 Inhaler    Inhale 2 puffs into the lungs every 6 hours as needed for shortness of breath / dyspnea or wheezing    Intermittent asthma, uncomplicated       ANUCORT-HC 25 MG Suppository   Generic drug:  hydrocortisone           levothyroxine 200 MCG tablet    SYNTHROID/LEVOTHROID    90 tablet    TAKE 1 TABLET(200 MCG) BY MOUTH DAILY    Hypothyroidism       omeprazole 40 MG capsule    priLOSEC    90 capsule    Take 1 capsule (40 mg) by mouth daily Take 30-60 minutes before a meal.    Nausea       oxyCODONE-acetaminophen 5-325 MG per tablet    PERCOCET     Take 1-2 tablets by mouth

## 2018-01-11 ENCOUNTER — THERAPY VISIT (OUTPATIENT)
Dept: CHIROPRACTIC MEDICINE | Facility: CLINIC | Age: 48
End: 2018-01-11
Payer: COMMERCIAL

## 2018-01-11 DIAGNOSIS — M99.03 SOMATIC DYSFUNCTION OF LUMBAR REGION: ICD-10-CM

## 2018-01-11 DIAGNOSIS — M99.05 SEGMENTAL DYSFUNCTION OF PELVIC REGION: Primary | ICD-10-CM

## 2018-01-11 DIAGNOSIS — M62.838 SPASM OF MUSCLE: ICD-10-CM

## 2018-01-11 DIAGNOSIS — M54.50 LUMBAGO: ICD-10-CM

## 2018-01-11 DIAGNOSIS — M99.03 SEGMENTAL DYSFUNCTION OF LUMBAR REGION: ICD-10-CM

## 2018-01-11 PROCEDURE — 99203 OFFICE O/P NEW LOW 30 MIN: CPT | Mod: 25 | Performed by: CHIROPRACTOR

## 2018-01-11 PROCEDURE — 98941 CHIROPRACT MANJ 3-4 REGIONS: CPT | Mod: AT | Performed by: CHIROPRACTOR

## 2018-01-11 NOTE — PROGRESS NOTES
Initial Chiropractic Clinic Visit    PCP: Roby Charles Sari Anguiano is a 47 year old female who is seen  in consultation at the request of  Roby Charles PA-C presenting with chonric low back pain . 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  The pain is located bilaterally across the tops of her hips.  She currently rates the pain at a 3 out of 10 and describes it as a dull achey pain.   When asked, patient denies:, falling, slipping, bending and reaching or sleeping awkwardly. She does not remember any trauma that would cause this pain.      Injury:     Location of Pain: bilaterally across the top of pelvis at the following level(s) L4  and L5   Duration of Pain: many year(s)  Rating of Pain at worst: 8/10  Rating of Pain Currently: 3/10  Symptoms are better with: Aleve and Rest  Symptoms are worse with: prolonged sitting on hard surface, prioonged standing and sleeping on her back  Additional Features:      Health History  as reported by the patient:    How does the patient rate their own health:   Fair    Current or past medical history:   Asthma, Chest pain, Numbness/tingling, Overweight, Pain at night/rest, Smoking and Thyroid problems    Medical allergies  Other: sulfa    Past Traumas/Surgeries  Other:  Hernia repari and polyps removed from uterus    Family History  The family history includes Asthma in an other family member; CANCER in her paternal grandfather; DIABETES in her father; Depression in her mother and another family member; Hyperlipidemia in her mother; Hypertension in her maternal grandmother and mother; Psychotic Disorder in her son; Thyroid Disease in an other family member.    Medications:  Thyroid    Occupation:      Primary job tasks:   Computer work, Driving, Lifting/carrying and Prolonged sitting    Barriers as home/work:   none    Additional health Issues:                 Carol Ann was asked to complete the  "Oswestry Low Back Disability Index and Damien Start Back screening tool, today in the office.  Disability score: 20%. Keel Start Total Score:6 Sub Score: 4     Review of Systems  Musculoskeletal: as above  Remainder of review of systems is negative including constitutional, CV, pulmonary, GI, Skin and Neurologic except as noted in HPI or medical history.    Past Medical History:   Diagnosis Date     Arthritis     great aunt     ASTHMA - MILD INTERMITTENT 6/11/2007     Cancer (H)     grandfather     Coronary artery disease     Grandfather and all 3 uncles have had heart attacks     Depressive disorder     myself and my mother     Family history of cancer     grandfather     Hypertension     Mother and grandmother     Unspecified hypothyroidism      Past Surgical History:   Procedure Laterality Date     ABDOMEN SURGERY  04/21/2017    hernia fixed     BIOPSY  05/01/2017    tested area of my cervix that was thought to be precancerous     CHOLECYSTECTOMY, LAPOROSCOPIC  2008    Cholecystectomy, Laparoscopic     DILATION AND CURETTAGE, HYSTEROSCOPY DIAGNOSTIC, COMBINED  04/21/2017    abnormal uterine bleeding     LASIK BILATERAL       Objective  There were no vitals taken for this visit.      GENERAL APPEARANCE: healthy, alert and no distress   GAIT: NORMAL  SKIN: no suspicious lesions or rashes  NEURO: Normal strength and tone, mentation intact and speech normal  PSYCH:  mentation appears normal and affect normal/bright    Low back exam:    Inspection:  \"     no visible deformity in the low back       normal skin\",    ROM:       full flexion       full extension    Tender:       paraspinal muscles    Non Tender:       remainder of lumbar spine    Strength:       hip flexion 5/5 Normal       knee extension 5/5 Normal       ankle dorsiflexion 5/5 Normal       ankle plantarflexion 5/5 Normal       dorsiflexion of the great toe 5/5 Normal    Reflexes:       patellar (L3, L4) 2 bilaterally    Sensation:      grossly intact " throughout lower extremities    Special tests:  SLR - Left positive, Fabere - Right positive, Yeoman's - Right negative and Left negative, Janelle - Right negative and Left negative and Ely's - Right negative and Left negative    Segmental spinal dysfunction/restrictions found at::  T10 Extension restriction  T11 Extension restriction  T12 Flexion restriction  L4 Right rotation restricted  L5 Right rotation restricted  PSIS Right Extension restriction.    The following soft tissue hypotonicities were observed:Piriformis: right, referred pain: no    Trigger points were found in:Piriformis    Muscle spasm found in:Piriformis      Radiology:      Assessment:    1. Segmental dysfunction of pelvic region    2. Lumbago    3. Segmental dysfunction of lumbar region    4. Spasm of muscle    5. Somatic dysfunction of lumbar region        RX ordered/plan of care  Anticipated outcomes  Possible risks and side effects    After discussing the risk and benefits of care, patient consented to treatment    Prognosis: Good      Patient's condition:  Patient had restrictions pre-manipulation    Treatment effectiveness:  Post manipulation there is better intersegmental movement and Patient claims to feel looser post manipulation      Plan:    Procedures:  Evaluation and Management:  99714 Moderate level exam 30 min    CMT:  64905 Chiropractic manipulative treatment 3-4 regions performed   Thoracic: Diversified, T10, T11, T12, Prone  Lumbar: Activator, L4, L5, Prone  Pelvis: Drop Table, PSIS Right , Prone    Modalities:  78823: Heat:   For 5 min to Piriformis  51547: MSTM:  To Piriformis  for 5 min    Therapeutic procedures:  None    Response to Treatment  Reduction in symptoms as reported by patient      Treatment plan and goals:  Goals:  PETER: To change PETER score from 20 to 5    Frequency of care  Duration of care is estimated to be 8 weeks, from the initial treatment.  It is estimated that the patient will need a total of 8 visits to  resolve this episode.  For the initial therapeutic trial of care, the frequency is recommended at 1 X week, once daily.  A reevaluation would be clinically appropriate in 8 visits, to determine progress and further course of care.    In-Office Treatment  Evaluation  Spinal Chiropractic Manipulative Therapy:    Modalities: Heat and MSTM        Recommendations:    Instructions:ice 20 minutes every other hour as needed    Follow-up:  Return to care in one week.       Discussed the assessment with the patient.      Disclaimer: This note consists of symbols derived from keyboarding, dictation and/or voice recognition software. As a result, there may be errors in the script that have gone undetected. Please consider this when interpreting information found in this chart.

## 2018-01-18 ENCOUNTER — THERAPY VISIT (OUTPATIENT)
Dept: CHIROPRACTIC MEDICINE | Facility: CLINIC | Age: 48
End: 2018-01-18
Payer: COMMERCIAL

## 2018-01-18 DIAGNOSIS — M99.03 SEGMENTAL DYSFUNCTION OF LUMBAR REGION: ICD-10-CM

## 2018-01-18 DIAGNOSIS — M99.01 CERVICAL SEGMENT DYSFUNCTION: ICD-10-CM

## 2018-01-18 DIAGNOSIS — M99.05 SEGMENTAL DYSFUNCTION OF PELVIC REGION: Primary | ICD-10-CM

## 2018-01-18 DIAGNOSIS — M62.838 SPASM OF MUSCLE: ICD-10-CM

## 2018-01-18 DIAGNOSIS — R51.9 HEAD ACHE: ICD-10-CM

## 2018-01-18 DIAGNOSIS — M99.02 THORACIC SEGMENT DYSFUNCTION: ICD-10-CM

## 2018-01-18 DIAGNOSIS — M54.50 LUMBAGO: ICD-10-CM

## 2018-01-18 PROCEDURE — 98941 CHIROPRACT MANJ 3-4 REGIONS: CPT | Mod: AT | Performed by: CHIROPRACTOR

## 2018-01-18 NOTE — PROGRESS NOTES
Initial Chiropractic Clinic Visit    PCP: Roby Charles Sari Anguiano is a 47 year old female who is seen  in consultation at the request of  Rboy Charles PA-C presenting with chonric low back pain . 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  The pain is located bilaterally across the tops of her hips.  She currently rates the pain at a 3 out of 10 and describes it as a dull achey pain.   When asked, patient denies:, falling, slipping, bending and reaching or sleeping awkwardly. She does not remember any trauma that would cause this pain.      Injury:     Location of Pain: bilaterally across the top of pelvis at the following level(s) L4  and L5   Duration of Pain: many year(s)  Rating of Pain at worst: 8/10  Rating of Pain Currently: 3/10  Symptoms are better with: Aleve and Rest  Symptoms are worse with: prolonged sitting on hard surface, prioonged standing and sleeping on her back  Additional Features:      Health History  as reported by the patient:    How does the patient rate their own health:   Fair    Current or past medical history:   Asthma, Chest pain, Numbness/tingling, Overweight, Pain at night/rest, Smoking and Thyroid problems    Medical allergies  Other: sulfa    Past Traumas/Surgeries  Other:  Hernia repari and polyps removed from uterus    Family History  The family history includes Asthma in an other family member; CANCER in her paternal grandfather; DIABETES in her father; Depression in her mother and another family member; Hyperlipidemia in her mother; Hypertension in her maternal grandmother and mother; Psychotic Disorder in her son; Thyroid Disease in an other family member.    Medications:  Thyroid    Occupation:      Primary job tasks:   Computer work, Driving, Lifting/carrying and Prolonged sitting    Barriers as home/work:   none    Additional health Issues:                 Carol Ann was asked to complete the  "Oswestry Low Back Disability Index and Damien Start Back screening tool, today in the office.  Disability score: 20%. Keel Start Total Score:6 Sub Score: 4     Review of Systems  Musculoskeletal: as above  Remainder of review of systems is negative including constitutional, CV, pulmonary, GI, Skin and Neurologic except as noted in HPI or medical history.    Past Medical History:   Diagnosis Date     Arthritis     great aunt     ASTHMA - MILD INTERMITTENT 6/11/2007     Cancer (H)     grandfather     Coronary artery disease     Grandfather and all 3 uncles have had heart attacks     Depressive disorder     myself and my mother     Family history of cancer     grandfather     Hypertension     Mother and grandmother     Unspecified hypothyroidism      Past Surgical History:   Procedure Laterality Date     ABDOMEN SURGERY  04/21/2017    hernia fixed     BIOPSY  05/01/2017    tested area of my cervix that was thought to be precancerous     CHOLECYSTECTOMY, LAPOROSCOPIC  2008    Cholecystectomy, Laparoscopic     DILATION AND CURETTAGE, HYSTEROSCOPY DIAGNOSTIC, COMBINED  04/21/2017    abnormal uterine bleeding     LASIK BILATERAL       Objective  There were no vitals taken for this visit.      GENERAL APPEARANCE: healthy, alert and no distress   GAIT: NORMAL  SKIN: no suspicious lesions or rashes  NEURO: Normal strength and tone, mentation intact and speech normal  PSYCH:  mentation appears normal and affect normal/bright    Low back exam:    Inspection:  \"     no visible deformity in the low back       normal skin\",    ROM:       full flexion       full extension    Tender:       paraspinal muscles    Non Tender:       remainder of lumbar spine    Strength:       hip flexion 5/5 Normal       knee extension 5/5 Normal       ankle dorsiflexion 5/5 Normal       ankle plantarflexion 5/5 Normal       dorsiflexion of the great toe 5/5 Normal    Reflexes:       patellar (L3, L4) 2 bilaterally    Sensation:      grossly intact " throughout lower extremities    Special tests:  SLR - Left positive, Fabere - Right positive, Yeoman's - Right negative and Left negative, Janelle - Right negative and Left negative and Ely's - Right negative and Left negative    Segmental spinal dysfunction/restrictions found at::  T10 Extension restriction  T11 Extension restriction  T12 Flexion restriction  L4 Right rotation restricted  L5 Right rotation restricted  PSIS Right Extension restriction.    The following soft tissue hypotonicities were observed:Piriformis: right, referred pain: no    Trigger points were found in:Piriformis    Muscle spasm found in:Piriformis      Radiology:      Assessment:    No diagnosis found.    RX ordered/plan of care  Anticipated outcomes  Possible risks and side effects    After discussing the risk and benefits of care, patient consented to treatment    Prognosis: Good      Patient's condition:  Patient had restrictions pre-manipulation    Treatment effectiveness:  Post manipulation there is better intersegmental movement and Patient claims to feel looser post manipulation      Plan:    Procedures:  Evaluation and Management:  95003 Moderate level exam 30 min    CMT:  29165 Chiropractic manipulative treatment 3-4 regions performed   Thoracic: Diversified, T10, T11, T12, Prone  Lumbar: Activator, L4, L5, Prone  Pelvis: Drop Table, PSIS Right , Prone    Modalities:  10967: Heat:   For 5 min to Piriformis  71732: MSTM:  To Piriformis  for 5 min    Therapeutic procedures:  None    Response to Treatment  Reduction in symptoms as reported by patient      Treatment plan and goals:  Goals:  PETER: To change PETER score from 20 to 5    Frequency of care  Duration of care is estimated to be 8 weeks, from the initial treatment.  It is estimated that the patient will need a total of 8 visits to resolve this episode.  For the initial therapeutic trial of care, the frequency is recommended at 1 X week, once daily.  A reevaluation would be  clinically appropriate in 8 visits, to determine progress and further course of care.    In-Office Treatment  Evaluation  Spinal Chiropractic Manipulative Therapy:    Modalities: Heat and MSTM        Recommendations:    Instructions:ice 20 minutes every other hour as needed    Follow-up:  Return to care in one week.       Discussed the assessment with the patient.      Disclaimer: This note consists of symbols derived from keyboarding, dictation and/or voice recognition software. As a result, there may be errors in the script that have gone undetected. Please consider this when interpreting information found in this chart.

## 2018-01-18 NOTE — PROGRESS NOTES
Visit #:  2 of 8, based on treatment plan    Subjective:  Carol Ann Anguiano is a 47 year old female who is seen in f/u up for:     1. Segmental dysfunction of pelvic region    2. Lumbago    3. Segmental dysfunction of lumbar region    4. Spasm of muscle    5. Somatic dysfunction of lumbar region      Since last visit on 1/11/2018,  Carol Ann Anguiano reports the following changes: Pain immediately after last treatment: 1/10 and their pain level today 0/10.     Area of chief complaint:  Lumbar :  Symptoms are graded at 0/10. The quality is described as stiff.  Motion has increased, but is still not normal. Patient feels that they are improved due to a reduction in symptoms. Wakes up with morning headaches.       Objective:  The following was observed:    P: palpatory tenderness, piriformis R>>L, sub occipital R    A: static palpation demonstrates intersegmental asymmetry , cervical, thoracic, lumbar, pelvis    R: motion palpation notes restricted motion, :  C1 Right rotation restricted  C6 Right rotation restricted  C7 Right rotation restricted  T1 Left rotation restricted  T2 Left rotation restricted  T6 Extension restriction  T7 Extension restriction  T8 Extension restriction  L4 Right rotation restricted  L5 Right rotation restricted  PSIS Right Extension restriction    T: hypertonicity at: Piriformis and Sub-occipital R>>L      Assessment:    Segmental spinal dysfunction/restrictions found at:  C1   C6   C7   T1   T2   T5  T6  T7  L4  L5  PSIS Right    Diagnoses:      1. Segmental dysfunction of pelvic region    2. Lumbago    3. Segmental dysfunction of lumbar region    4. Spasm of muscle    5. Thoracic segment dysfunction    6. Head ache    7. Cervical segment dysfunction        Patient's condition:  Patient had restrictions pre-manipulation    Treatment effectiveness:  Post manipulation there is better intersegmental movement and Patient claims to feel looser post manipulation      Procedures:  CMT:  35425  Chiropractic manipulative treatment 3-4 regions performed   Cervical: Diversified and Activator, C1 , C6, C7 , Prone  Thoracic: Diversified, T1, T2, T5, T6, T7, Prone  Lumbar: Activator, L4, L5, Prone  Pelvis: Drop Table, PSIS Right , Prone    Modalities:  37909: MSTM:  To Piriformis  for 5 min    Therapeutic procedures:  None      Prognosis: Good    Progress towards Goals: Patient is making progress towards the goal     Response to Treatment:   Reduction in symptoms as reported by patient      Recommendations:    Instructions:ice 20 minutes every other hour as needed    Follow-up:  Return to care in one week

## 2018-01-29 ENCOUNTER — PRE VISIT (OUTPATIENT)
Dept: UROLOGY | Facility: CLINIC | Age: 48
End: 2018-01-29

## 2018-03-13 ENCOUNTER — OFFICE VISIT (OUTPATIENT)
Dept: OBGYN | Facility: CLINIC | Age: 48
End: 2018-03-13
Payer: COMMERCIAL

## 2018-03-13 VITALS
DIASTOLIC BLOOD PRESSURE: 86 MMHG | BODY MASS INDEX: 29.11 KG/M2 | HEART RATE: 71 BPM | OXYGEN SATURATION: 96 % | WEIGHT: 169.6 LBS | SYSTOLIC BLOOD PRESSURE: 132 MMHG

## 2018-03-13 DIAGNOSIS — N87.0 CIN I (CERVICAL INTRAEPITHELIAL NEOPLASIA I): ICD-10-CM

## 2018-03-13 DIAGNOSIS — N93.9 ABNORMAL UTERINE BLEEDING: Primary | ICD-10-CM

## 2018-03-13 DIAGNOSIS — Z12.31 ENCOUNTER FOR SCREENING MAMMOGRAM FOR BREAST CANCER: ICD-10-CM

## 2018-03-13 PROCEDURE — 99214 OFFICE O/P EST MOD 30 MIN: CPT | Mod: 25 | Performed by: OBSTETRICS & GYNECOLOGY

## 2018-03-13 PROCEDURE — 58100 BIOPSY OF UTERUS LINING: CPT | Performed by: OBSTETRICS & GYNECOLOGY

## 2018-03-13 PROCEDURE — 88305 TISSUE EXAM BY PATHOLOGIST: CPT | Performed by: OBSTETRICS & GYNECOLOGY

## 2018-03-13 PROCEDURE — G0145 SCR C/V CYTO,THINLAYER,RESCR: HCPCS | Performed by: OBSTETRICS & GYNECOLOGY

## 2018-03-13 PROCEDURE — 87624 HPV HI-RISK TYP POOLED RSLT: CPT | Performed by: OBSTETRICS & GYNECOLOGY

## 2018-03-13 RX ORDER — ACETAMINOPHEN AND CODEINE PHOSPHATE 120; 12 MG/5ML; MG/5ML
1 SOLUTION ORAL DAILY
Qty: 84 TABLET | Refills: 0 | Status: SHIPPED | OUTPATIENT
Start: 2018-03-13 | End: 2018-05-15

## 2018-03-13 NOTE — MR AVS SNAPSHOT
After Visit Summary   3/13/2018    Carol Ann Anguiano    MRN: [id number]           Patient Information     Date Of Birth          1970        Visit Information        Provider Department      3/13/2018 9:30 AM Javier Huynh MD [facility]        Today's Diagnoses     Abnormal uterine bleeding    -  1    JONATHON I (cervical intraepithelial neoplasia I)        Encounter for screening mammogram for breast cancer           Follow-ups after your visit        Follow-up notes from your care team     Return in about 10 weeks (around 5/22/2018).      Future tests that were ordered for you today     Open Future Orders        Priority Expected Expires Ordered    *MA Screening Digital Bilateral Routine  3/13/2019 3/13/2018            Who to contact     If you have questions or need follow up information about today's clinic visit or your schedule please contact [facility] directly at [phone].  Normal or non-critical lab and imaging results will be communicated to you by MyChart, letter or phone within 4 business days after the clinic has received the results. If you do not hear from us within 7 days, please contact the clinic through [employer] or phone. If you have a critical or abnormal lab result, we will notify you by phone as soon as possible.  Submit refill requests through [employer] or call your pharmacy and they will forward the refill request to us. Please allow 3 business days for your refill to be completed.          Additional Information About Your Visit        MyChart Information     [employer] gives you secure access to your electronic health record. If you see a primary care provider, you can also send messages to your care team and make appointments. If you have questions, please call your primary care clinic.  If you do not have a primary care provider, please call [phone] and they will assist you.        Care EveryWhere ID     This is your Care EveryWhere  ID. This could be used by other organizations to access your [place] medical records  [id number]        Your Vitals Were     Pulse Last Period Pulse Oximetry Breastfeeding? BMI (Body Mass Index)       71 03/01/2018 96% No 29.11 kg/m2        Blood Pressure from Last 3 Encounters:   03/13/18 132/86   01/05/18 120/77   11/13/17 106/74    Weight from Last 3 Encounters:   03/13/18 169 lb 9.6 oz (76.9 kg)   01/05/18 172 lb 12.8 oz (78.4 kg)   10/06/17 174 lb (78.9 kg)              We Performed the Following     ENDOMETRIAL BIOPSY W/O CERVICAL DILATION     HPV High Risk Types DNA Cervical     Pap imaged thin layer screen with HPV - recommended age 30 - 65 years (select HPV order below)     Surgical pathology exam          Today's Medication Changes          These changes are accurate as of 3/13/18 10:26 AM.  If you have any questions, ask your nurse or doctor.               Start taking these medicines.        Dose/Directions    norethindrone 0.35 MG per tablet   Commonly known as:  MICRONOR   Used for:  Abnormal uterine bleeding   Started by:  Javier Huynh MD        Dose:  1 tablet   Take 1 tablet (0.35 mg) by mouth daily   Quantity:  84 tablet   Refills:  0            Where to get your medicines      These medications were sent to [facility] [address] [place] [address] [address] AT [address], [facility] [place] [address]    Hours:  Test fax successful 12/11/02   Phone:  [phone]     norethindrone 0.35 MG per tablet                Primary Care Provider Office Phone # Fax #    Roby Charles PA-C [phone]       [facility] [address]        Equal Access to Services     CELESTE ALFORD AH: Calin Somers, dari cr, sandie kaalmashawn cates, marci casas. So [facility] [phone].    ATENCIÓN: Si habla español, tiene a duran disposición servicios gratuitos de asistencia  lingüística. Esme al [phone].    We comply with applicable federal civil rights laws and [place] laws. We do not discriminate on the basis of race, color, national origin, age, disability, sex, sexual orientation, or gender identity.            Thank you!     Thank you for choosing [facility]  for your care. Our goal is always to provide you with excellent care. Hearing back from our patients is one way we can continue to improve our services. Please take a few minutes to complete the written survey that you may receive in the mail after your visit with us. Thank you!             Your Updated Medication List - Protect others around you: Learn how to safely use, store and throw away your medicines at www.disposemymeds.org.          This list is accurate as of 3/13/18 10:26 AM.  Always use your most recent med list.                   Brand Name Dispense Instructions for use Diagnosis    albuterol 108 (90 BASE) MCG/ACT Inhaler    PROAIR HFA/PROVENTIL HFA/VENTOLIN HFA    1 Inhaler    Inhale 2 puffs into the lungs every 6 hours as needed for shortness of breath / dyspnea or wheezing    Intermittent asthma, uncomplicated       ANUCORT-HC 25 MG Suppository   Generic drug:  hydrocortisone           levothyroxine 200 MCG tablet    SYNTHROID/LEVOTHROID    90 tablet    TAKE 1 TABLET(200 MCG) BY MOUTH DAILY    Hypothyroidism       norethindrone 0.35 MG per tablet    MICRONOR    84 tablet    Take 1 tablet (0.35 mg) by mouth daily    Abnormal uterine bleeding       omeprazole 40 MG capsule    priLOSEC    90 capsule    Take 1 capsule (40 mg) by mouth daily Take 30-60 minutes before a meal.    Nausea

## 2018-03-13 NOTE — PROGRESS NOTES
Carol Ann is a 47 year old  female .   She presents today to further discuss menses.  She has her menses twice a month.  The last cycle was 9 days in between.  She has had associated clots.  The size of the clots are about a quarter in size.  Previously about a dime size.    Her pad use is about 10 pads a day; she changes them frequently, but they are not dry pads.    She has cramps, back pain, PMS.    No aggravating or alleviating factors.   She has some anxiety, depression and anger associated with it.    She had a D&C last year with polyp removal.  She thought she would wait and see if she has any changes.        Component      Latest Ref Rng & Units 10/6/2017   WBC      4.0 - 11.0 10e9/L 9.0   RBC Count      3.8 - 5.2 10e12/L 5.17   Hemoglobin      11.7 - 15.7 g/dL 15.8 (H)   Hematocrit      35.0 - 47.0 % 46.2   Platelet Count      150 - 450 10e9/L 335   TSH      0.40 - 4.00 mU/L 0.13 (L)   T4 Free      0.76 - 1.46 ng/dL 2.02 (H)             ULTRASOUND PELVIC COMPLETE WITH TRANSVAGINAL IMAGING 2017 9:30 AM  HISTORY: Frequent, heavy bleeding. Abnormal uterine and vaginal bleeding, unspecified.     FINDINGS:  Transvaginal images were performed to better evaluate the patient's uterus, ovaries and endometrial stripe.     The uterus is normal in size measuring 7.1 x 4.2 x 4.2 cm. There is a myometrial cyst measuring 0.6 cm in maximal diameter in the anterior uterine body. Endometrial stripe measures 11 mm and is mildly thickened for patient's age. A slight hypoechoic area in the fundal myometrium could represent a small submucosal fibroid, but more likely  a polyp measuring 0.7 x 0.5 x 0.7 cm. The right ovary is normal  measuring 3.0 x 2.9 x 1.6 cm. The left ovary is normal measuring 1.8 x 1.8 x 1.0 cm. The ovaries demonstrate normal color Doppler flow bilaterally. No adnexal masses are present. No free pelvic fluid is present.  IMPRESSION: Suspected endometrial polyp in the region of the fundal endometrium.  Suspected polyp measures up to 0.7 cm in size. No endometrial fluid is evident. Follow-up hysterosonogram could be performed for further assessment if clinically warranted. Small submucosal fibroid remains in the differential. Ovaries are unremarkable. Small cyst anterior uterine body is of doubtful clinical significance.        CT ABDOMEN AND PELVIS WITH CONTRAST 10/27/2017 3:03 PM      HISTORY: Evaluate hematuria and right mid back pain - evaluate possible renal cause of pain. History of tobacco use. Pain in thoracic spine. Hematuria, unspecified. Nicotine dependence, unspecified, uncomplicated.     FINDINGS:   The lung bases are clear.     Abdomen: Small cyst is noted in the anterior left hepatic lobe of  doubtful clinical significance. This measures less than 1 cm on series  5, image 17. The liver is otherwise unremarkable. Prior  cholecystectomy. The spleen, pancreas and adrenal glands are  unremarkable. No enlarged lymph nodes. The bowel is normal in caliber  without obstruction, diverticulitis or appendicitis.     Right urinary tract:  No renal masses or cysts are present. There is a  questionable distal right ureteral stone on series 2, image 75 and  series 5, image 75 adjacent to the UVJ. Mild right hydronephrosis is  noted. Delayed imaging demonstrates a normal right renal collecting  system and ureter. Ureter is normal in caliber and course.     Left urinary tract:  No renal masses or cysts are present. No  collecting system or ureteral stones are present. There is no  hydronephrosis. Delayed imaging demonstrates a normal left renal  collecting system and ureter. Ureter is normal in caliber and course.     Bladder:  No bladder calculi are appreciated. No bladder wall  thickening or nodularity is appreciated. Delayed images show no evidence of filling defects.     Pelvis:  Tampon is present in the vaginal vault. The uterus, ovaries and rectum are unremarkable. No enlarged pelvic lymph nodes or free fluid.  Bone window examination is within normal limits.     IMPRESSION:    1. Possible tiny 1-2 mm stone near the right UVJ causing mild right hydronephrosis. No other urinary tract calculi. Follow-up as clinically warranted.  2. No renal cyst or mass. No left hydronephrosis or urinary tract calculi. Bladder is unremarkable.  3. Cholecystectomy changes.     Surgical Pathology                                Case: [id number]                                   Authorizing Provider:  Galileo Vogt MD      Collected:           04/21/2017 01:54 PM          Ordering Location:     Intra-Op                   Received:            04/22/2017 08:23 AM          Pathologist:           Keiko Higginbotham MD                                                        Specimens:   A) - Endocervical, curettings                                                                       B) - Endometrium, endometrial curettings                                                     Final Diagnosis A: Endocervical curettings  -Ectocervix and endocervix with focal low-grade squamous intraepithelial lesion (JONATHON-1/HPV effect).  B: Endometrial curettings  -Benign proliferating endometrium and benign endometrial polyp.       Clinical Information Pre-op diagnosis:UMBILICAL HERNIA REPAIR, ABNORMAL BLEEDING AND ENDOMETRIAL POLYP  N93.9M, N84.0, K42.9     Gross Description A: Endocervical curettings: Multiple tan-pink soft tissue fragment is admixed with mucus, 0.5 g and 0.8 x 0.3 x 0.2 cm in aggregate.  Filtered.  IT-1.    B: Endometrial curettings: Multiple red-brown soft tissue fragments admixed with hemorrhage, 1 g and 2 x 2 x 0.3 cm in aggregate.  Filtered.  IT-1.  (JR)     Microscopic Description A. Sections show endocervix and endocervix. Focally, there is a low-grade squamous intraepithelial lesion (JONATHON-1/HPV effect). A immunoperoxidase stain reactive against P16 is applied with an appropriate positive control and shows focal increased staining.    B.  Sections show benign proliferating endometrium and fragments of benign endometrial polyp. There is no hyperplasia or malignancy.             Past Medical History:   Diagnosis Date     Arthritis     great aunt     ASTHMA - MILD INTERMITTENT 2007     Cancer (H)     grandfather     Coronary artery disease     Grandfather and all 3 uncles have had heart attacks     Depressive disorder     myself and my mother     Family history of cancer     grandfather     Hypertension     Mother and grandmother     Unspecified hypothyroidism        Past Surgical History:   Procedure Laterality Date     ABDOMEN SURGERY  2017    hernia fixed     BIOPSY  2017    tested area of my cervix that was thought to be precancerous     CHOLECYSTECTOMY, LAPOROSCOPIC      Cholecystectomy, Laparoscopic     DILATION AND CURETTAGE, HYSTEROSCOPY DIAGNOSTIC, COMBINED  2017    abnormal uterine bleeding     LASIK BILATERAL         Obstetric History       T1      L2     SAB0   TAB0   Ectopic0   Multiple0   Live Births0       # Outcome Date GA Lbr Fabricio/2nd Weight Sex Delivery Anes PTL Lv   2 Term            1                    Gynecological History         Patient's last menstrual period was 2018.      see above HPI        Allergies   Allergen Reactions     Chantix [Varenicline]      Stomach cramps  Stomach cramps      Eryc [Erythromycin]      dry heaves  nausea     Erythromycin      N/V       Current Outpatient Prescriptions   Medication Sig Dispense Refill     levothyroxine (SYNTHROID/LEVOTHROID) 200 MCG tablet TAKE 1 TABLET(200 MCG) BY MOUTH DAILY 90 tablet 1     omeprazole (PRILOSEC) 40 MG capsule Take 1 capsule (40 mg) by mouth daily Take 30-60 minutes before a meal. 90 capsule 3     ANUCORT-HC 25 MG Suppository   0     albuterol (PROAIR HFA/PROVENTIL HFA/VENTOLIN HFA) 108 (90 BASE) MCG/ACT Inhaler Inhale 2 puffs into the lungs every 6 hours as needed for shortness of breath / dyspnea or wheezing 1  Inhaler 1       Social History     Social History     Marital status:      Spouse name: Saul     Number of children: 2     Years of education: 14     Occupational History      [employer]     Social History Main Topics     Smoking status: Light Tobacco Smoker     Packs/day: 1.00     Years: 25.00     Types: Cigarettes     Smokeless tobacco: Never Used      Comment: We are attempting again to quit 3/2017     Alcohol use 0.0 oz/week      Comment: occasionally     Drug use: No     Sexual activity: Yes     Partners: Male     Birth control/ protection: None      Comment: my  has a vasectomy     Other Topics Concern     Parent/Sibling W/ Cabg, Mi Or Angioplasty Before 65f 55m? No     Social History Narrative    Dairy/d 3-4 servings/d.     Caffeine 1 servings/d    Exercise 0 x week very active    Sunscreen used - Yes    Seatbelts used - Yes    Working smoke/CO detectors in the home - Yes    Guns stored in the home - No    Self Breast Exams - Yes    Self Testicular Exam - NA    Eye Exam up to date - Due for an appointment.     Dental Exam up to date - Due for an appointment.     Pap Smear up to date - Yes    Mammogram up to date - n/a    PSA up to date - NA    Dexa Scan up to date - NA    Flex Sig / Colonoscopy up to date - NA    Immunizations up to date - Yes    Abuse: Current or Past(Physical, Sexual or Emotional)- No    Do you feel safe in your environment - Yes    Saul Blackman MA    9/21/07           Family History   Problem Relation Age of Onset     Hypertension Mother      Depression Mother      Hyperlipidemia Mother      DIABETES Father      never met that man     Hypertension Maternal Grandmother      CANCER Paternal Grandfather      lungs/throat     Psychotic Disorder Son      adhd/ocd     Asthma Other      Thyroid Disease Other      Depression Other        Review of Systems:  10 point ROS of systems including Constitutional, Eyes, Respiratory, Cardiovascular,  Gastroenterology, Genitourinary, Integumentary, Muscularskeletal, Psychiatric were all negative except for pertinent positives noted in my HPI and in the PMH.        EXAM:  /86 (BP Location: Right arm, Cuff Size: Adult Regular)  Pulse 71  Wt 169 lb 9.6 oz (76.9 kg)  LMP 03/01/2018  SpO2 96%  Breastfeeding? No  BMI 29.11 kg/m2  Body mass index is 29.11 kg/(m^2).  General:  WNWD female, NAD  Alert  Oriented x 3  Gait:  Normal  Skin:  Normal skin turgor  HEENT:  NC/AT, EOMI  Neck:  No masses noted, symmetrical  Lungs:  Good respiratory effort   Abdomen:  Non-tender, non-distended.  Vulva: No external lesions, normal hair distribution, no adenopathy  BUS:  Normal, no masses noted  Urethra:  No hypermobility noted   Urethral meatus:  No masses or lesions seen.  Vagina: Moist, pink, no abnormal discharge, well rugated, no lesions  Cervix: Smooth, pink, no visible lesions  Uterus: Normal size, anteverted, non-tender, mobile  Ovaries: No mass, non-tender, mobile  Perianal: no masses or lesions seen.  Extremities:  No clubbing, cyanosis or edema.       ASSESSMENT:  Abnormal perimenopausal bleeding       PLAN:  We discussed the bleeding profiles as people age and approach menopause.  Even though menstrual changes and irregularities are common and expected, they may also indicate or precipitate endometrial abnormalities.  The patient and I discussed the options for evaluation.  The EMB, SIS and the D&C were reviewed with her.  The EMB will not remove a polyp, but will give a tissue sample.  The SIS will further evaluate the lining, but no tissue sample, and should she have a suspected polyp, it would not be removed.  The D&C is more expensive but is currently the gold standard.  She is ok with proceeding with the EMB.   Together we reviewed the risks and benefits of medical versus surgical therapy.  Medical therapy reviewed included hormonal manipulation with OCP's, Patch, Ring, Depo, or IUD.   We reviewed the  aspects of fibroid embolization, however, the ultrasound shows a very small fibroid, so this would be of little benefit.  We reviewed endometrial ablation versus hysterectomy.  Discussed that endometrial ablation is minimally invasive compared to hysterectomy but may not be definitive.   She is ok with a trial of progestin only OCPs.  She will follow up in a couple of months.  Pap smear is performed today  Schedule for the mammogram.        Javier Huynh MD      PROCEDURE NOTE:  The procedure was reviewed with patient.  After consenting to the procedure she was placed into the dorsal lithotomy position.  The examination was performed.  The speculum was placed into the vagina.  The cervix was prepped with Betadine.  The anterior lip of the cervix was grasped with the Allis tenaculum.  The uterus was sounded to 7 cm.  The Pipelle was used to sample the endometrium.  3 passes made and little tissue was obtained.    Instruments were removed and the specimen was sent to pathology.  Results to the patient in 1-2 weeks when they are returned.    Javier Huynh MD

## 2018-03-13 NOTE — NURSING NOTE
"Chief Complaint   Patient presents with     Abnormal Uterine Bleeding     Discuss options irregular and heavy periods       Initial /86 (BP Location: Right arm, Cuff Size: Adult Regular)  Pulse 71  Wt 169 lb 9.6 oz (76.9 kg)  LMP 03/01/2018  SpO2 96%  Breastfeeding? No  BMI 29.11 kg/m2 Estimated body mass index is 29.11 kg/(m^2) as calculated from the following:    Height as of 1/5/18: 5' 4\" (1.626 m).    Weight as of this encounter: 169 lb 9.6 oz (76.9 kg).  Medication Reconciliation: complete   DREA Mason 3/13/2018         "

## 2018-03-15 LAB
COPATH REPORT: NORMAL
PAP: NORMAL

## 2018-03-16 LAB
COPATH REPORT: NORMAL
FINAL DIAGNOSIS: NORMAL
HPV HR 12 DNA CVX QL NAA+PROBE: NEGATIVE
HPV16 DNA SPEC QL NAA+PROBE: NEGATIVE
HPV18 DNA SPEC QL NAA+PROBE: NEGATIVE
SPECIMEN DESCRIPTION: NORMAL
SPECIMEN SOURCE CVX/VAG CYTO: NORMAL

## 2018-03-19 ENCOUNTER — MYC MEDICAL ADVICE (OUTPATIENT)
Dept: OBGYN | Facility: CLINIC | Age: 48
End: 2018-03-19

## 2018-03-19 NOTE — TELEPHONE ENCOUNTER
norethindrone (MICRONOR) 0.35 MG per tablet 84 tablet 0 3/13/2018  --   Sig: Take 1 tablet (0.35 mg) by mouth daily     Reply sent to patient.  Will route to Dr. Huynh for review & orders. Annel Zamudio RN, BAN

## 2018-03-28 ENCOUNTER — THERAPY VISIT (OUTPATIENT)
Dept: CHIROPRACTIC MEDICINE | Facility: CLINIC | Age: 48
End: 2018-03-28
Payer: COMMERCIAL

## 2018-03-28 ENCOUNTER — MYC MEDICAL ADVICE (OUTPATIENT)
Dept: FAMILY MEDICINE | Facility: CLINIC | Age: 48
End: 2018-03-28

## 2018-03-28 DIAGNOSIS — M54.2 CERVICALGIA: ICD-10-CM

## 2018-03-28 DIAGNOSIS — M62.838 SPASM OF MUSCLE: ICD-10-CM

## 2018-03-28 DIAGNOSIS — M54.50 LUMBAGO: ICD-10-CM

## 2018-03-28 DIAGNOSIS — M99.01 CERVICAL SEGMENT DYSFUNCTION: ICD-10-CM

## 2018-03-28 DIAGNOSIS — M99.02 THORACIC SEGMENT DYSFUNCTION: ICD-10-CM

## 2018-03-28 DIAGNOSIS — M99.05 SEGMENTAL DYSFUNCTION OF PELVIC REGION: Primary | ICD-10-CM

## 2018-03-28 DIAGNOSIS — M99.03 SEGMENTAL DYSFUNCTION OF LUMBAR REGION: ICD-10-CM

## 2018-03-28 PROCEDURE — 98941 CHIROPRACT MANJ 3-4 REGIONS: CPT | Mod: AT | Performed by: CHIROPRACTOR

## 2018-03-28 NOTE — PROGRESS NOTES
Visit #:  3 of 8, based on treatment plan    Subjective:  Carol Ann Anguiano is a 47 year old female who is seen in f/u up for:     1. Segmental dysfunction of pelvic region    2. Lumbago    3. Segmental dysfunction of lumbar region    4. Spasm of muscle    5. Somatic dysfunction of lumbar region      Since last visit on 1/18/2018,  Carol Ann Anguiano reports the following changes: Pain immediately after last treatment: 1/10 and their pain level today 4/10.   Carol Ann reports that she has been doing well but her back has been a little bit more stiff and sore because she has been working out a lot more.  She has been walking about 3 miles a day,  Headaches have improved    Area of chief complaint:  Lumbar :  Symptoms are graded at 4/10. The quality is described as stiff.  Motion has increased, but is still not normal. Patient feels that they are improved due to a reduction in symptoms overall.      Objective:  The following was observed:    P: palpatory tenderness, piriformis R>>L, sub occipital R    A: static palpation demonstrates intersegmental asymmetry , cervical, thoracic, lumbar, pelvis    R: motion palpation notes restricted motion, :  C6 Right rotation restricted  C7 Right rotation restricted  T1 Left rotation restricted  T2 Left rotation restricted  T6 Extension restriction  T7 Extension restriction  T8 Extension restriction  L4 Right rotation restricted  L5 Right rotation restricted  PSIS Right Extension restriction    T: hypertonicity at: Piriformis and Sub-occipital R>>L      Assessment:    Segmental spinal dysfunction/restrictions found at:  C6   C7   T1   T2   T5  T6  T7  L4  L5  PSIS Right    Diagnoses:      1. Segmental dysfunction of pelvic region    2. Lumbago    3. Segmental dysfunction of lumbar region    4. Spasm of muscle    5. Thoracic segment dysfunction    6. Head ache    7. Cervical segment dysfunction        Patient's condition:  Patient had restrictions pre-manipulation    Treatment  effectiveness:  Post manipulation there is better intersegmental movement and Patient claims to feel looser post manipulation      Procedures:  CMT:  33622 Chiropractic manipulative treatment 3-4 regions performed   Cervical: Diversified and Activator,C6, C7 , Prone  Thoracic: Diversified, T1, T2, T5, T6, T7, Prone  Lumbar: Activator, L4, L5, Prone  Pelvis: Drop Table, PSIS Right , Prone    Modalities:  65345: MSTM:  To Piriformis  for 5 min    Therapeutic procedures:  None      Prognosis: Good    Progress towards Goals: Patient is making progress towards the goal     Response to Treatment:   Reduction in symptoms as reported by patient      Recommendations:    Instructions:ice 20 minutes every other hour as needed    Follow-up:  Return to care in one week

## 2018-04-11 ENCOUNTER — THERAPY VISIT (OUTPATIENT)
Dept: CHIROPRACTIC MEDICINE | Facility: CLINIC | Age: 48
End: 2018-04-11
Payer: COMMERCIAL

## 2018-04-11 DIAGNOSIS — M54.2 CERVICALGIA: ICD-10-CM

## 2018-04-11 DIAGNOSIS — M99.03 SEGMENTAL DYSFUNCTION OF LUMBAR REGION: ICD-10-CM

## 2018-04-11 DIAGNOSIS — M54.50 LUMBAGO: ICD-10-CM

## 2018-04-11 DIAGNOSIS — M99.02 THORACIC SEGMENT DYSFUNCTION: ICD-10-CM

## 2018-04-11 DIAGNOSIS — M62.838 SPASM OF MUSCLE: ICD-10-CM

## 2018-04-11 DIAGNOSIS — M99.01 CERVICAL SEGMENT DYSFUNCTION: ICD-10-CM

## 2018-04-11 DIAGNOSIS — M99.05 SEGMENTAL DYSFUNCTION OF PELVIC REGION: Primary | ICD-10-CM

## 2018-04-11 PROCEDURE — 98941 CHIROPRACT MANJ 3-4 REGIONS: CPT | Mod: AT | Performed by: CHIROPRACTOR

## 2018-04-11 NOTE — PROGRESS NOTES
Visit #:  4 of 8, based on treatment plan    Subjective:  Carol Ann Anguiano is a 47 year old female who is seen in f/u up for:     1. Segmental dysfunction of pelvic region    2. Lumbago    3. Segmental dysfunction of lumbar region    4. Spasm of muscle    5. Somatic dysfunction of lumbar region      Since last visit on 3/28/2018,  Carol Ann Anguiano reports the following changes: Pain immediately after last treatment: 1/10 and their pain level today 4/10.   Carol Ann reports that she has been doing well but her back has been a little bit more stiff and sore because she has been walking a lot more.  She has been walking about 4000 steps a day.  She has also been looking after her  who is at home recovering from a shoulder labrum repair.  Her neck seems a little stiff and sore it was better for a few weeks after last visit and then the stiffness returns.    Area of chief complaint:  Lumbar :  Symptoms are graded at 4/10. The quality is described as stiff.  Motion has increased, but is still not normal. Patient feels that they are improved due to a reduction in symptoms overall.      Objective:  The following was observed:    P: palpatory tenderness, piriformis R>>L, sub occipital R    A: static palpation demonstrates intersegmental asymmetry , cervical, thoracic, lumbar, pelvis    R: motion palpation notes restricted motion, :  C6 Right rotation restricted  C7 Right rotation restricted  T1 Left rotation restricted  T2 Left rotation restricted  T6 Extension restriction  T7 Extension restriction  T8 Extension restriction  L4 Right rotation restricted  L5 Right rotation restricted  PSIS Right Extension restriction    T: hypertonicity at: Piriformis and Sub-occipital R>>L      Assessment:    Segmental spinal dysfunction/restrictions found at:  C6   C7   T1   T2   T5  T6  T7  L4  L5  PSIS Right    Diagnoses:      1. Segmental dysfunction of pelvic region    2. Lumbago    3. Segmental dysfunction of lumbar region    4.  Spasm of muscle    5. Thoracic segment dysfunction    6. Head ache    7. Cervical segment dysfunction        Patient's condition:  Patient had restrictions pre-manipulation    Treatment effectiveness:  Post manipulation there is better intersegmental movement and Patient claims to feel looser post manipulation      Procedures:  CMT:  67258 Chiropractic manipulative treatment 3-4 regions performed   Cervical: Diversified and Activator,C6, C7 , Prone  Thoracic: Diversified, T1, T2, T5, T6, T7, Prone  Lumbar: Activator, L4, L5, Prone  Pelvis: Drop Table, PSIS Right , Prone    Modalities:  92262: MSTM:  To Piriformis  for 5 min    Therapeutic procedures:  None      Prognosis: Good    Progress towards Goals: Patient is making progress towards the goal     Response to Treatment:   Reduction in symptoms as reported by patient      Recommendations:    Instructions:ice 20 minutes every other hour as needed    Follow-up:  Return to care in one week

## 2018-04-18 ENCOUNTER — THERAPY VISIT (OUTPATIENT)
Dept: CHIROPRACTIC MEDICINE | Facility: CLINIC | Age: 48
End: 2018-04-18
Payer: COMMERCIAL

## 2018-04-18 DIAGNOSIS — M54.2 CERVICALGIA: ICD-10-CM

## 2018-04-18 DIAGNOSIS — M99.05 SEGMENTAL DYSFUNCTION OF PELVIC REGION: Primary | ICD-10-CM

## 2018-04-18 DIAGNOSIS — M99.03 SEGMENTAL DYSFUNCTION OF LUMBAR REGION: ICD-10-CM

## 2018-04-18 DIAGNOSIS — M99.02 THORACIC SEGMENT DYSFUNCTION: ICD-10-CM

## 2018-04-18 DIAGNOSIS — M54.50 LUMBAGO: ICD-10-CM

## 2018-04-18 DIAGNOSIS — M99.01 CERVICAL SEGMENT DYSFUNCTION: ICD-10-CM

## 2018-04-18 DIAGNOSIS — M62.838 SPASM OF MUSCLE: ICD-10-CM

## 2018-04-18 PROCEDURE — 98941 CHIROPRACT MANJ 3-4 REGIONS: CPT | Mod: AT | Performed by: CHIROPRACTOR

## 2018-04-18 NOTE — PROGRESS NOTES
Visit #:  5 of 8, based on treatment plan    Subjective:  Carol Ann Anguiano is a 47 year old female who is seen in f/u up for:     1. Segmental dysfunction of pelvic region    2. Lumbago    3. Segmental dysfunction of lumbar region    4. Spasm of muscle    5. Somatic dysfunction of lumbar region      Since last visit on 4/11/2018,  Carol Ann Anguiano reports the following changes: Pain immediately after last treatment: 1/10 and their pain level today 4/10.   Carol Ann reports that she has been doing well for a bout 2 days and then the pain returns.  She is still working hard help her  rehabilitate from shoulder surgery.  In addition, this past week she had to shovel snow which was wet and heavy.    Area of chief complaint:  Lumbar :  Symptoms are graded at 4/10. The quality is described as stiff.  Motion has increased, but is still not normal. Patient feels that they are improved due to a reduction in symptoms overall.      Objective:  The following was observed:    P: palpatory tenderness, piriformis R>>L, sub occipital R    A: static palpation demonstrates intersegmental asymmetry , cervical, thoracic, lumbar, pelvis    R: motion palpation notes restricted motion, :  C6 Right rotation restricted  C7 Right rotation restricted  T1 Left rotation restricted  T2 Left rotation restricted  T6 Extension restriction  T7 Extension restriction  T8 Extension restriction  L4 Right rotation restricted  L5 Right rotation restricted  PSIS Right Extension restriction    T: hypertonicity at: Piriformis and Sub-occipital R>>L      Assessment:    Segmental spinal dysfunction/restrictions found at:  C6   C7   T1   T2   T5  T6  T7  L4  L5  PSIS Right    Diagnoses:      1. Segmental dysfunction of pelvic region    2. Lumbago    3. Segmental dysfunction of lumbar region    4. Spasm of muscle    5. Thoracic segment dysfunction    6. Head ache    7. Cervical segment dysfunction        Patient's condition:  Patient had restrictions  pre-manipulation    Treatment effectiveness:  Post manipulation there is better intersegmental movement and Patient claims to feel looser post manipulation      Procedures:  CMT:  40302 Chiropractic manipulative treatment 3-4 regions performed   Cervical: Diversified and Activator,C6, C7 , Prone  Thoracic: Diversified, T1, T2, T5, T6, T7, Prone  Lumbar: Activator, L4, L5, Prone  Pelvis: Drop Table, PSIS Right , Prone    Modalities:  07087: MSTM:  To Piriformis  for 5 min    Therapeutic procedures:  None      Prognosis: Good    Progress towards Goals: Patient is making progress towards the goal     Response to Treatment:   Reduction in symptoms as reported by patient      Recommendations:    Instructions:ice 20 minutes every other hour as needed    Follow-up:  Return to care in one week

## 2018-04-24 ENCOUNTER — RADIANT APPOINTMENT (OUTPATIENT)
Dept: MAMMOGRAPHY | Facility: CLINIC | Age: 48
End: 2018-04-24
Attending: OBSTETRICS & GYNECOLOGY
Payer: COMMERCIAL

## 2018-04-24 DIAGNOSIS — Z12.31 VISIT FOR SCREENING MAMMOGRAM: ICD-10-CM

## 2018-04-24 PROCEDURE — 77067 SCR MAMMO BI INCL CAD: CPT | Mod: TC

## 2018-04-25 ENCOUNTER — THERAPY VISIT (OUTPATIENT)
Dept: PHYSICAL THERAPY | Facility: CLINIC | Age: 48
End: 2018-04-25
Payer: COMMERCIAL

## 2018-04-25 ENCOUNTER — THERAPY VISIT (OUTPATIENT)
Dept: CHIROPRACTIC MEDICINE | Facility: CLINIC | Age: 48
End: 2018-04-25
Payer: COMMERCIAL

## 2018-04-25 DIAGNOSIS — M99.01 CERVICAL SEGMENT DYSFUNCTION: ICD-10-CM

## 2018-04-25 DIAGNOSIS — M62.838 SPASM OF MUSCLE: ICD-10-CM

## 2018-04-25 DIAGNOSIS — M99.05 SEGMENTAL DYSFUNCTION OF PELVIC REGION: Primary | ICD-10-CM

## 2018-04-25 DIAGNOSIS — M54.50 CHRONIC BILATERAL LOW BACK PAIN WITHOUT SCIATICA: Primary | ICD-10-CM

## 2018-04-25 DIAGNOSIS — G89.29 CHRONIC BILATERAL LOW BACK PAIN WITHOUT SCIATICA: Primary | ICD-10-CM

## 2018-04-25 DIAGNOSIS — M99.03 SEGMENTAL DYSFUNCTION OF LUMBAR REGION: ICD-10-CM

## 2018-04-25 DIAGNOSIS — M54.2 CERVICALGIA: ICD-10-CM

## 2018-04-25 DIAGNOSIS — M54.50 LUMBAGO: ICD-10-CM

## 2018-04-25 DIAGNOSIS — M99.02 SEGMENTAL DYSFUNCTION OF THORACIC REGION: ICD-10-CM

## 2018-04-25 PROCEDURE — 98941 CHIROPRACT MANJ 3-4 REGIONS: CPT | Mod: AT | Performed by: CHIROPRACTOR

## 2018-04-25 PROCEDURE — 97110 THERAPEUTIC EXERCISES: CPT | Mod: GP | Performed by: PHYSICAL THERAPIST

## 2018-04-25 PROCEDURE — 97161 PT EVAL LOW COMPLEX 20 MIN: CPT | Mod: GP | Performed by: PHYSICAL THERAPIST

## 2018-04-25 NOTE — PROGRESS NOTES
Visit #:  6 of 8, based on treatment plan    Subjective:  Carol Ann Anguiano is a 47 year old female who is seen in f/u up for:     1. Segmental dysfunction of pelvic region    2. Lumbago    3. Segmental dysfunction of lumbar region    4. Spasm of muscle    5. Somatic dysfunction of lumbar region      Since last visit on 4/18/2018,  Carol Ann Anguiano reports the following changes: Pain immediately after last treatment: 1/10 and their pain level today 4/10.   Carol Ann reports that she has been doing well for about 2 days and then the pain returns.  She is still working hard help her  rehabilitate from shoulder surgery.  She started pt today and was given some stretches.  She is also starting to plan and organize a graduation party for her daughter.    Area of chief complaint:  Lumbar :  Symptoms are graded at 4/10. The quality is described as stiff.  Motion has increased, but is still not normal. Patient feels that they are improved due to a reduction in symptoms overall.      Objective:  The following was observed:    P: palpatory tenderness, piriformis R>>L, sub occipital R    A: static palpation demonstrates intersegmental asymmetry , cervical, thoracic, lumbar, pelvis    R: motion palpation notes restricted motion, :  C6 Right rotation restricted  C7 Right rotation restricted  T1 Left rotation restricted  T2 Left rotation restricted  T6 Extension restriction  T7 Extension restriction  T8 Extension restriction  L4 Right rotation restricted  L5 Right rotation restricted  PSIS Right Extension restriction    T: hypertonicity at: Piriformis and Sub-occipital R>>L      Assessment:    Segmental spinal dysfunction/restrictions found at:  C6   C7   T1   T2   T5  T6  T7  L4  L5  PSIS Right    Diagnoses:      1. Segmental dysfunction of pelvic region    2. Lumbago    3. Segmental dysfunction of lumbar region    4. Spasm of muscle    5. Thoracic segment dysfunction    6. Head ache    7. Cervical segment dysfunction         Patient's condition:  Patient had restrictions pre-manipulation    Treatment effectiveness:  Post manipulation there is better intersegmental movement and Patient claims to feel looser post manipulation      Procedures:  CMT:  74374 Chiropractic manipulative treatment 3-4 regions performed   Cervical: Diversified and Activator,C6, C7 , Prone  Thoracic: Diversified, T1, T2, T5, T6, T7, Prone  Lumbar: Activator, L4, L5, Prone  Pelvis: Drop Table, PSIS Right , Prone    Modalities:  43445: MSTM:  To Piriformis  for 5 min    Therapeutic procedures:  None      Prognosis: Good    Progress towards Goals: Patient is making progress towards the goal     Response to Treatment:   Reduction in symptoms as reported by patient      Recommendations:    Instructions:ice 20 minutes every other hour as needed    Follow-up:  Return to care in one week

## 2018-04-25 NOTE — PROGRESS NOTES
"[facility] Initial Evaluation -- Lumbar    Date: April 25, 2018  Carol Ann Anguiano is a 47 year old female with a lumbar condition.   Referral: Gurjit Charles PA-C  Work mechanical stresses:  sitting  Employment status:    Leisure mechanical stresses: housework, yardwork  Functional disability score (PETER/STarT Back):  30%; high Damien  VAS score (0-10): 4/10 current; up to 8/10 at worst  Patient goals/expectations:  Be less painful or have less constant pain; must be able to sit for work    HISTORY:    Present symptoms: bilateral low back/upper buttock pain  Pain quality (sharp/shooting/stabbing/aching/burning/cramping):  aching   Paresthesia (yes/no):  no    Present since (onset date): chronic since age 14; date of MD order for PT 11/13/16.     Symptoms (improving/unchanging/worsening):  worsening.     Symptoms commenced as a result of: initially from spinal tap/prolonged flexion at age 14; no other injury that she recalls   Condition occurred in the following environment:   community     Symptoms at onset (back/thigh/leg): back  Constant symptoms (back/thigh/leg): back  Intermittent symptoms (back/thigh/leg): bilateral buttocks; bilateral anterior thigh    Symptoms are made worse with the following: Always Sitting, Always Standing, Sometimes Walking and Always When still - worse with doing \"anything for too long\"  Symptoms are made better with the following: Sometimes Walking, Time of day - Always AM and Always PM and Other - ice, stretching hips (only short-lived relief)    Disturbed sleep (yes/no):  no Sleeping postures (prone/sup/side R/L): varies    Previous episodes (0/1-5/6-10/11+): 10+ Year of first episode: age 14    Previous history: see above  Previous treatments: chiropractic - short lived relief      Specific Questions:  Cough/Sneeze/Strain (pos/neg): neg  Bowel/Bladder (normal/abnormal): normal  Gait (normal/abnormal): normal  Medications " (nil/NSAIDS/analg/steroids/anticoag/other):  Other - Thyroid  Medical allergies:  See chart  General health (excellent/good/fair/poor):  good  Pertinent medical history:  Overweight, Asthma, Menopausal and past smoker, thyroid problems  Imaging (None/Xray/MRI/Other):  none  Recent or major surgery (yes/no):  no  Night pain (yes/no): no  Accidents (yes/no): no  Unexplained weight loss (yes/no): no  Barriers at home: none  Other red flags: none    EXAMINATION    Posture:   Sitting (good/fair/poor): fair  Standing (good/fair/poor):fair  Lordosis (red/acc/normal): red  Correction of posture (better/worse/no effect): worse    Lateral Shift (right/left/nil): nil  Relevant (yes/no):  NA  Other Observations: decreased trunk/pelvis dissociation    Neurological:    Motor deficit:  none  Reflexes:  NT  Sensory deficit:  NT  Dural signs:  NT    Movement Loss:   Gerard Mod Min Nil Pain   Flexion    X ERP and PDM   Extension  X   ERP   Side Gliding R  X   PDM   Side Gliding L   X  ERP     Test Movements:   During: produces, abolishes, increases, decreases, no effect, centralizing, peripheralizing   After: better, worse, no better, no worse, no effect, centralized, peripheralized    Pretest symptoms standing: pain across low back 2/10   Symptoms During Symptoms After ROM increased ROM decreased No Effect   FIS Increases No Worse      Rep FIS Increases    No Worse  X - decreased extension    EIS Increases No Worse      Rep EIS Increases No Worse X - increased extension     Pretest symptoms lying: central low back pain    Symptoms During Symptoms After ROM increased ROM decreased No Effect   SMITHA          Rep SMITHA          EIL Increases No Worse      Rep EIL Increases No Worse X - increased extension         Static Tests:  Sitting slouched:  NT  Sitting erect:  NT  Standing slouched NT  Standing erect:  NT  Lying prone in extension:  Better, NB, NE ROM Long sitting:  NT    Other Tests: Poor TA setting - test formally next    Provisional  Classification:  Derangement - Bilateral, symmetrical, symptoms above knee    Principle of Management:  Education:  Upright sitting, posture correction as able  Equipment provided:  none  Mechanical therapy (Y/N):  Y   Extension principle:  REIL with standing extension as alternate X 10 every 2-3 hours Lateral Principle:  no  Flexion principle:  no  Other:  no    ASSESSMENT/PLAN:    Patient is a 47 year old female with lumbar complaints.    Patient has the following significant findings with corresponding treatment plan.                Diagnosis 1:  Chronic low back pain with underlying hypomobility    Pain -  manual therapy, self management, education, directional preference exercise and home program  Decreased ROM/flexibility - manual therapy, therapeutic exercise and home program  Decreased joint mobility - manual therapy, therapeutic exercise and home program  Decreased strength - therapeutic exercise, therapeutic activities and home program  Impaired muscle performance - neuro re-education and home program  Impaired posture - neuro re-education and home program    Therapy Evaluation Codes:   1) History comprised of:   Personal factors that impact the plan of care:      Coping style, Overall behavior pattern, Past/current experiences and Time since onset of symptoms.    Comorbidity factors that impact the plan of care are:      None.     Medications impacting care: thyroid.  2) Examination of Body Systems comprised of:   Body structures and functions that impact the plan of care:      Lumbar spine.   Activity limitations that impact the plan of care are:      Bending, Driving, Sitting, Walking and Working.  3) Clinical presentation characteristics are:   Stable/Uncomplicated.  4) Decision-Making    Low complexity using standardized patient assessment instrument and/or measureable assessment of functional outcome.  Cumulative Therapy Evaluation is: Low complexity.    Previous and current functional limitations:   (See Goal Flow Sheet for this information)    Short term and Long term goals: (See Goal Flow Sheet for this information)     Communication ability:  Patient appears to be able to clearly communicate and understand verbal and written communication and follow directions correctly.  Treatment Explanation - The following has been discussed with the patient:   RX ordered/plan of care  Anticipated outcomes  Possible risks and side effects  This patient would benefit from PT intervention to resume normal activities.   Rehab potential is good.    Frequency:  1 X week, once daily  Duration:  for 6 weeks  Discharge Plan:  Achieve all LTG.  Independent in home treatment program.  Reach maximal therapeutic benefit.    Please refer to the daily flowsheet for treatment today, total treatment time and time spent performing 1:1 timed codes.

## 2018-04-25 NOTE — MR AVS SNAPSHOT
After Visit Summary   4/25/2018    Carol Ann Anguiano    MRN: [id number]           Patient Information     Date Of Birth          1970        Visit Information        Provider Department      4/25/2018 3:20 PM Carlos Urban, PT [facility]        Today's Diagnoses     Chronic bilateral low back pain without sciatica    -  1       Follow-ups after your visit        Your next 10 appointments already scheduled     May 03, 2018  4:00 PM CDT   [facility] Chiropractor with Gamal Belcher DC   [facility] ([facility])    [address]  [place] [address]   [phone]            May 03, 2018  4:30 PM CDT   СЕРГЕЙ Spine with Carlos Urban, PT   [facility] ([facility])    [address]  [address]  [place] [address]   [phone]            May 09, 2018  3:45 PM CDT   СЕ[facility]ГЕЙ Chiropractor with Gamal Belcher DC   [facility] ([facility])    [address]  [place] [address]   [phone]            May 09, 2018  4:00 PM CDT   СЕРГЕЙ Spine with Carlos Urban PT   [facility] ([facility] [place])    [address]  [address]  [place] [address]   [phone]            May 15, 2018  7:30 AM CDT   Office Visit with Javier Huynh MD   [facility] [address]   [phone]           Bring a current list of meds and any records pertaining to this visit. For Physicals, please bring immunization records and any forms needing to be filled out. Please arrive 10 minutes early to complete paperwork.            May 16, 2018  4:00 PM CDT   [facility] Chiropractor with Gamal Belcher DC   [facility] ([facility] [place])    [address]  [place] [address]   [phone]            May 23, 2018  4:00 PM MAIDAT   СЕРГЕЙ  Spine with Carlos Urban PT   [facility] [place] [facility] ([facility] [place])    [address]  [address]  [place] [address] [phone]            May 23, 2018  4:45 PM CDT   СЕРГЕЙ Chiropractor with Gamal Belcher DC   [facility] ([facility] [place])    [address]  [place] [address] [phone]            May 30, 2018  4:00 PM CDT   СЕРГЕЙ Spine with Carlos Urban PT   [facility] [place] [facility] ([facility] [place])    [address]  [address]  [place] [address] [phone]              Who to contact     If you have questions or need follow up information about today's clinic visit or your schedule please contact [facility] directly at [phone].  Normal or non-critical lab and imaging results will be communicated to you by [employer], letter or phone within 4 business days after the clinic has received the results. If you do not hear from us within 7 days, please contact the clinic through [employer] or phone. If you have a critical or abnormal lab result, we will notify you by phone as soon as possible.  Submit refill requests through [employer] or call your pharmacy and they will forward the refill request to us. Please allow 3 business days for your refill to be completed.          Additional Information About Your Visit        [employer] Information     [employer] gives you secure access to your electronic health record. If you see a primary care provider, you can also send messages to your care team and make appointments. If you have questions, please call your primary care clinic.  If you do not have a primary care provider, please call [phone] and they will assist you.        Care EveryWhere ID     This is your Care EveryWhere ID. This could be used by other organizations to access your [facility] medical records  [id number]         Blood Pressure from Last 3 Encounters:   03/13/18  132/86   01/05/18 120/77   11/13/17 106/74    Weight from Last 3 Encounters:   03/13/18 76.9 kg (169 lb 9.6 oz)   01/05/18 78.4 kg (172 lb 12.8 oz)   10/06/17 78.9 kg (174 lb)              We Performed the Following     HC PT EVAL, LOW COMPLEXITY     СЕРГЕЙ INITIAL EVAL REPORT     THERAPEUTIC EXERCISES        Primary Care Provider Office Phone # Fax #    Roby Charles PA-C [phone]       [facility] [address]        Equal Access to Services     [facility]: Hadii aad ku hadasho Soomaali, waaxda luqadaha, qaybta kaalmada adeegyada, marci winston hayvenus hooper . So [facility] [phone].    ATENCIÓN: Si habla español, tiene a duran disposición servicios gratuitos de asistencia lingüística. [facility] [phone].    We comply with applicable federal civil rights laws and [place] laws. We do not discriminate on the basis of race, color, national origin, age, disability, sex, sexual orientation, or gender identity.            Thank you!     Thank you for choosing [facility]  for your care. Our goal is always to provide you with excellent care. Hearing back from our patients is one way we can continue to improve our services. Please take a few minutes to complete the written survey that you may receive in the mail after your visit with us. Thank you!             Your Updated Medication List - Protect others around you: Learn how to safely use, store and throw away your medicines at www.disposemymeds.org.          This list is accurate as of 4/25/18 11:59 PM.  Always use your most recent med list.                   Brand Name Dispense Instructions for use Diagnosis    albuterol 108 (90 Base) MCG/ACT Inhaler    PROAIR HFA/PROVENTIL HFA/VENTOLIN HFA    1 Inhaler    Inhale 2 puffs into the lungs every 6 hours as needed for shortness of breath / dyspnea or wheezing    Intermittent asthma, uncomplicated       ANUCORT-HC 25 MG Suppository   Generic  drug:  hydrocortisone           levothyroxine 200 MCG tablet    SYNTHROID/LEVOTHROID    90 tablet    TAKE 1 TABLET(200 MCG) BY MOUTH DAILY    Hypothyroidism       norethindrone 0.35 MG per tablet    MICRONOR    84 tablet    Take 1 tablet (0.35 mg) by mouth daily    Abnormal uterine bleeding       omeprazole 40 MG capsule    priLOSEC    90 capsule    Take 1 capsule (40 mg) by mouth daily Take 30-60 minutes before a meal.    Nausea

## 2018-05-09 ENCOUNTER — THERAPY VISIT (OUTPATIENT)
Dept: PHYSICAL THERAPY | Facility: CLINIC | Age: 48
End: 2018-05-09
Payer: COMMERCIAL

## 2018-05-09 ENCOUNTER — THERAPY VISIT (OUTPATIENT)
Dept: CHIROPRACTIC MEDICINE | Facility: CLINIC | Age: 48
End: 2018-05-09
Payer: COMMERCIAL

## 2018-05-09 DIAGNOSIS — M54.50 CHRONIC BILATERAL LOW BACK PAIN WITHOUT SCIATICA: ICD-10-CM

## 2018-05-09 DIAGNOSIS — M99.01 CERVICAL SEGMENT DYSFUNCTION: ICD-10-CM

## 2018-05-09 DIAGNOSIS — M99.05 SEGMENTAL DYSFUNCTION OF PELVIC REGION: Primary | ICD-10-CM

## 2018-05-09 DIAGNOSIS — M99.02 THORACIC SEGMENT DYSFUNCTION: ICD-10-CM

## 2018-05-09 DIAGNOSIS — M54.2 CERVICALGIA: ICD-10-CM

## 2018-05-09 DIAGNOSIS — M99.03 SEGMENTAL DYSFUNCTION OF LUMBAR REGION: ICD-10-CM

## 2018-05-09 DIAGNOSIS — G89.29 CHRONIC BILATERAL LOW BACK PAIN WITHOUT SCIATICA: ICD-10-CM

## 2018-05-09 DIAGNOSIS — M62.838 SPASM OF MUSCLE: ICD-10-CM

## 2018-05-09 PROCEDURE — 98941 CHIROPRACT MANJ 3-4 REGIONS: CPT | Mod: AT | Performed by: CHIROPRACTOR

## 2018-05-09 PROCEDURE — 97530 THERAPEUTIC ACTIVITIES: CPT | Mod: GP | Performed by: PHYSICAL THERAPIST

## 2018-05-09 PROCEDURE — 97112 NEUROMUSCULAR REEDUCATION: CPT | Mod: GP | Performed by: PHYSICAL THERAPIST

## 2018-05-09 PROCEDURE — 97110 THERAPEUTIC EXERCISES: CPT | Mod: GP | Performed by: PHYSICAL THERAPIST

## 2018-05-09 NOTE — PROGRESS NOTES
Visit #:  7 of 8, based on treatment plan    Subjective:  Carol Ann Anguiano is a 47 year old female who is seen in f/u up for:     1. Segmental dysfunction of pelvic region    2. Lumbago    3. Segmental dysfunction of lumbar region    4. Spasm of muscle    5. Somatic dysfunction of lumbar region      Since last visit on 4/25/2018,  Carol Ann Anguiano reports the following changes: Pain immediately after last treatment: 1/10 and their pain level today 2/10.   Carol Ann reports that she has been doing well.  It has been 2 weeks since her last visit and she is feeling pretty good.  She manage to spread @100 bags of mulch last weekend and she is not feeling too bad.  Overall she is feeling improved.    Area of chief complaint:  Lumbar :  Symptoms are graded at 2/10. The quality is described as stiff.  Motion has increased, but is still not normal. Patient feels that they are improved due to a reduction in symptoms overall.      Objective:  The following was observed:    P: palpatory tenderness, piriformis R>>L, sub occipital R    A: static palpation demonstrates intersegmental asymmetry , cervical, thoracic, lumbar, pelvis    R: motion palpation notes restricted motion, :  C6 Right rotation restricted  C7 Right rotation restricted  T1 Left rotation restricted  T2 Left rotation restricted  T6 Extension restriction  T7 Extension restriction  T8 Extension restriction  L4 Right rotation restricted  L5 Right rotation restricted  PSIS Right Extension restriction    T: hypertonicity at: Piriformis and Sub-occipital R>>L      Assessment:    Segmental spinal dysfunction/restrictions found at:  C6   C7   T1   T2   T5  T6  T7  L4  L5  PSIS Right    Diagnoses:      1. Segmental dysfunction of pelvic region    2. Lumbago    3. Segmental dysfunction of lumbar region    4. Spasm of muscle    5. Thoracic segment dysfunction    6. Head ache    7. Cervical segment dysfunction        Patient's condition:  Patient had restrictions  pre-manipulation    Treatment effectiveness:  Post manipulation there is better intersegmental movement and Patient claims to feel looser post manipulation      Procedures:  CMT:  09627 Chiropractic manipulative treatment 3-4 regions performed   Cervical: Diversified and Activator,C6, C7 , Prone  Thoracic: Diversified, T1, T2, T5, T6, T7, Prone  Lumbar: Activator, L4, L5, Prone  Pelvis: Drop Table, PSIS Right , Prone    Modalities:  51973: MSTM:  To Piriformis  for 5 min    Therapeutic procedures:  None      Prognosis: Good    Progress towards Goals: Patient is making progress towards the goal     Response to Treatment:   Reduction in symptoms as reported by patient      Recommendations:    Instructions:ice 20 minutes every other hour as needed    Follow-up:  Return to care in one week

## 2018-05-09 NOTE — MR AVS SNAPSHOT
After Visit Summary   5/9/2018    Carol Ann Anguiano    MRN: [id number]           Patient Information     Date Of Birth          1970        Visit Information        Provider Department      5/9/2018 4:00 PM Carlos Urban, PT [facility]        Today's Diagnoses     Chronic bilateral low back pain without sciatica           Follow-ups after your visit        Your next 10 appointments already scheduled     May 15, 2018  7:30 AM CDT   Office Visit with Javier Huynh MD   [facility] ([address] [address] [phone]           Bring a current list of meds and any records pertaining to this visit. For Physicals, please bring immunization records and any forms needing to be filled out. Please arrive 10 minutes early to complete paperwork.            May 16, 2018  4:00 PM CDT   [facility] Chiropractor with Gamal Belcher DC   [facility] ([facility])    [address]  [place] [address]   [phone]            May 23, 2018  4:00 PM CDT   СЕ[facility]ГЕЙ Spine with Carlos Urban PT   [facility] ([facility])    [address]  [address]  [place] [address]   [phone]            May 23, 2018  4:45 PM CDT   СЕРГЕЙ Chiropractor with Gamal Belcher DC   [facility] ([facility])    [address]  [place] [address]   [phone]            May 30, 2018  4:00 PM CDT   СЕРГЕЙ Spine with Carlos Urban PT   [facility] ([facility])    [address]  [address]  [place] [address]   [phone]              Who to contact     If you have questions or need follow up information about today's clinic visit or your schedule please contact [facility] directly at [phone].  Normal or non-critical lab and imaging  results will be communicated to you by MyChart, letter or phone within 4 business days after the clinic has received the results. If you do not hear from us within 7 days, please contact the clinic through [employer] or phone. If you have a critical or abnormal lab result, we will notify you by phone as soon as possible.  Submit refill requests through [employer] or call your pharmacy and they will forward the refill request to us. Please allow 3 business days for your refill to be completed.          Additional Information About Your Visit        [employer] Information     [employer] gives you secure access to your electronic health record. If you see a primary care provider, you can also send messages to your care team and make appointments. If you have questions, please call your primary care clinic.  If you do not have a primary care provider, please call [phone] and they will assist you.        Care EveryWhere ID     This is your Care EveryWhere ID. This could be used by other organizations to access your [facility] medical records  [id number]         Blood Pressure from Last 3 Encounters:   03/13/18 132/86   01/05/18 120/77   11/13/17 106/74    Weight from Last 3 Encounters:   03/13/18 76.9 kg (169 lb 9.6 oz)   01/05/18 78.4 kg (172 lb 12.8 oz)   10/06/17 78.9 kg (174 lb)              We Performed the Following     NEUROMUSCULAR RE-EDUCATION     THERAPEUTIC ACTIVITIES     THERAPEUTIC EXERCISES        Primary Care Provider Office Phone # Fax #    Roby Charles PA-C [phone]       [facility] [address]        Equal Access to Services     CELESTE ALFORD : Hadii yasmine porter hadasho Soeshaali, waaxda luqadaha, qaybta kaalmada adeegyashawn, marci casas. So [facility] [phone].    ATENCIÓN: Si habla español, tiene a duran disposición servicios gratuitos de asistencia lingüística. Llame al [phone].    We comply with applicable federal civil rights laws and  [place] laws. We do not discriminate on the basis of race, color, national origin, age, disability, sex, sexual orientation, or gender identity.            Thank you!     Thank you for choosing [facility]  for your care. Our goal is always to provide you with excellent care. Hearing back from our patients is one way we can continue to improve our services. Please take a few minutes to complete the written survey that you may receive in the mail after your visit with us. Thank you!             Your Updated Medication List - Protect others around you: Learn how to safely use, store and throw away your medicines at www.disposemymeds.org.          This list is accurate as of 5/9/18  4:39 PM.  Always use your most recent med list.                   Brand Name Dispense Instructions for use Diagnosis    albuterol 108 (90 Base) MCG/ACT Inhaler    PROAIR HFA/PROVENTIL HFA/VENTOLIN HFA    1 Inhaler    Inhale 2 puffs into the lungs every 6 hours as needed for shortness of breath / dyspnea or wheezing    Intermittent asthma, uncomplicated       ANUCORT-HC 25 MG Suppository   Generic drug:  hydrocortisone           levothyroxine 200 MCG tablet    SYNTHROID/LEVOTHROID    90 tablet    TAKE 1 TABLET(200 MCG) BY MOUTH DAILY    Hypothyroidism       norethindrone 0.35 MG per tablet    MICRONOR    84 tablet    Take 1 tablet (0.35 mg) by mouth daily    Abnormal uterine bleeding       omeprazole 40 MG capsule    priLOSEC    90 capsule    Take 1 capsule (40 mg) by mouth daily Take 30-60 minutes before a meal.    Nausea

## 2018-05-09 NOTE — PROGRESS NOTES
"Subjective:  HPI                    Objective:  System    Physical Exam    General     ROS    Assessment/Plan:    SUBJECTIVE  Subjective changes as noted by pt: Patient returns after a 2 week hiatus from PT. She reports that she feels less sore. She did a lot of lawn work/laying mulch and didn't have as much pain as she expected. Generally feeling better. Doing exercises \"when she can\" - about 2 times per day at a minimum, some days more often. Posture correction at work helps. Not as much pain in the anterior thighs in the last 2 weeks.    Current pain level: 1/10   Changes in function:  Yes (See Goal flowsheet attached for changes in current functional level)     Adverse reaction to treatment or activity:  None    OBJECTIVE  Changes in objective findings: Lumbar AROM flexion to ankles, extension min loss; SG min loss bilat. All motions without reproduction of pain today. MMT hip abd 4/5 bilaterally.     ASSESSMENT  Carol Ann continues to require intervention to meet STG and LTG's: PT  Patient is progressing as expected.  Response to therapy has shown an improvement in  pain level and ROM   Progress made towards STG/LTG?  Yes (See Goal flowsheet attached for updates on achievement of STG and LTG)    PLAN  Current treatment program is being advanced to more complex exercises.    PTA/ATC plan:  N/A    Please refer to the daily flowsheet for treatment today, total treatment time and time spent performing 1:1 timed codes.          "

## 2018-05-15 ENCOUNTER — OFFICE VISIT (OUTPATIENT)
Dept: OBGYN | Facility: CLINIC | Age: 48
End: 2018-05-15
Payer: COMMERCIAL

## 2018-05-15 VITALS
DIASTOLIC BLOOD PRESSURE: 78 MMHG | OXYGEN SATURATION: 97 % | HEART RATE: 63 BPM | SYSTOLIC BLOOD PRESSURE: 114 MMHG | BODY MASS INDEX: 30.55 KG/M2 | WEIGHT: 178 LBS

## 2018-05-15 DIAGNOSIS — N93.9 ABNORMAL UTERINE BLEEDING: Primary | ICD-10-CM

## 2018-05-15 PROCEDURE — 99214 OFFICE O/P EST MOD 30 MIN: CPT | Performed by: OBSTETRICS & GYNECOLOGY

## 2018-05-15 NOTE — MR AVS SNAPSHOT
After Visit Summary   5/15/2018    Carol Ann Anguiano    MRN: [id number]           Patient Information     Date Of Birth          1970        Visit Information        Provider Department      5/15/2018 7:30 AM Javier Huynh MD [facility]        Today's Diagnoses     Abnormal uterine bleeding    -  1       Follow-ups after your visit        Your next 10 appointments already scheduled     May 16, 2018  4:00 PM CDT   СЕ[facility]ГЕ[facility] Chiropractor with Gamal Belcher DC   [facility] ([facility])    [address]  [place] [address]   [phone]            May 23, 2018  4:00 PM CDT   СЕРГЕЙ Spine with Carlos Urban, PT   [facility] ([facility])    [address]  [address]  [place] [address]   [phone]            May 23, 2018  4:45 PM CDT   СЕРГЕЙ Chiropractor with Gamal Belcher DC   [facility] ([facility])    [address]  [place] [address]   [phone]            May 30, 2018  4:00 PM CDT   СЕРГЕЙ Spine with Carlos Urban PT   [facility] ([facility])    [address]  [address]  [place] [address]   [phone]              Who to contact     If you have questions or need follow up information about today's clinic visit or your schedule please contact [facility] directly at [phone].  Normal or non-critical lab and imaging results will be communicated to you by MyChart, letter or phone within 4 business days after the clinic has received the results. If you do not hear from us within 7 days, please contact the clinic through MyChart or phone. If you have a critical or abnormal lab result, we will notify you by phone as soon as possible.  Submit refill requests through [employer] or call your pharmacy and they will forward the refill request to us. Please allow 3 business days for your  refill to be completed.          Additional Information About Your Visit        MyChart Information     [employer] gives you secure access to your electronic health record. If you see a primary care provider, you can also send messages to your care team and make appointments. If you have questions, please call your primary care clinic.  If you do not have a primary care provider, please call [phone] and they will assist you.        Care EveryWhere ID     This is your Care EveryWhere ID. This could be used by other organizations to access your [facility] medical records  [id number]        Your Vitals Were     Pulse Last Period Pulse Oximetry BMI (Body Mass Index)          63 04/24/2018 97% 30.55 kg/m2         Blood Pressure from Last 3 Encounters:   05/15/18 114/78   03/13/18 132/86   01/05/18 120/77    Weight from Last 3 Encounters:   05/15/18 178 lb (80.7 kg)   03/13/18 169 lb 9.6 oz (76.9 kg)   01/05/18 172 lb 12.8 oz (78.4 kg)              Today, you had the following     No orders found for display       Primary Care Provider Office Phone # Fax #    Roby Charles PA-C [phone]       [address] [address]        Equal Access to Services     CELESTE ALFORD : Hadii aad ku hadasho Soomaali, waaxda luqadaha, qaybta kaalmada adeegyada, waxay idiin haysergion raisa casas. So [facility] [phone].    ATENCIÓN: Si habla español, tiene a duran disposición servicios gratuitos de asistencia lingüística. Llame al [phone].    We comply with applicable federal civil rights laws and [place] laws. We do not discriminate on the basis of race, color, national origin, age, disability, sex, sexual orientation, or gender identity.            Thank you!     Thank you for choosing [facility]  for your care. Our goal is always to provide you with excellent care. Hearing back from our patients is one way we can continue to improve our services. Please take a few minutes to  complete the written survey that you may receive in the mail after your visit with us. Thank you!             Your Updated Medication List - Protect others around you: Learn how to safely use, store and throw away your medicines at www.disposemymeds.org.          This list is accurate as of 5/15/18  7:32 PM.  Always use your most recent med list.                   Brand Name Dispense Instructions for use Diagnosis    albuterol 108 (90 Base) MCG/ACT Inhaler    PROAIR HFA/PROVENTIL HFA/VENTOLIN HFA    1 Inhaler    Inhale 2 puffs into the lungs every 6 hours as needed for shortness of breath / dyspnea or wheezing    Intermittent asthma, uncomplicated       ANUCORT-HC 25 MG Suppository   Generic drug:  hydrocortisone           levothyroxine 200 MCG tablet    SYNTHROID/LEVOTHROID    90 tablet    TAKE 1 TABLET(200 MCG) BY MOUTH DAILY    Hypothyroidism       omeprazole 40 MG capsule    priLOSEC    90 capsule    Take 1 capsule (40 mg) by mouth daily Take 30-60 minutes before a meal.    Nausea

## 2018-05-15 NOTE — PROGRESS NOTES
Carol Ann is a 47 year old female, , who presents today for further discussion of abnormal uterine bleeding.  She has had bleeding twice a month with 9 days between bleeding.  She has had associated quarter size clots.  Previously the clots were dime size.  She has used 10 pads a day; she frequently changes the pads, but they are not dry pads.  She has also had associated cramping, back pain and PMS.  The bleeding and cramping has created some anxiety, depression and anger.  Last year, she had a D&C with polyp removal.    At her past visit in , she had an endometrial biopsy and she left here with some bleeding after her appointment.   Due to her tobacco use, she was given Micronor.  She did not take the pills until after her bleeding stopped.  The first month she had no bleeding problems.  She then developed bleeding and cramping pain on the second month of pills and it was lasting 14 days and she did not have any changes in the bleeding, so she stopped the pills and the bleeding resolved on May 7.  She has not been on them since.  She isn't sure what approach she would like to proceed with.      Past Medical History:   Diagnosis Date     Arthritis     great aunt     ASTHMA - MILD INTERMITTENT 2007     Cancer (H)     grandfather     Coronary artery disease     Grandfather and all 3 uncles have had heart attacks     Depressive disorder     myself and my mother     Family history of cancer     grandfather     Hypertension     Mother and grandmother     Unspecified hypothyroidism        Past Surgical History:   Procedure Laterality Date     ABDOMEN SURGERY  2017    hernia fixed     BIOPSY  2017    tested area of my cervix that was thought to be precancerous     CHOLECYSTECTOMY, LAPOROSCOPIC      Cholecystectomy, Laparoscopic     DILATION AND CURETTAGE, HYSTEROSCOPY DIAGNOSTIC, COMBINED  2017    abnormal uterine bleeding     LASIK BILATERAL            Allergies   Allergen Reactions      Chantix [Varenicline]      Stomach cramps  Stomach cramps      Eryc [Erythromycin]      dry heaves  nausea     Erythromycin      N/V       Current Outpatient Prescriptions   Medication Sig Dispense Refill     levothyroxine (SYNTHROID/LEVOTHROID) 200 MCG tablet TAKE 1 TABLET(200 MCG) BY MOUTH DAILY 90 tablet 1     albuterol (PROAIR HFA/PROVENTIL HFA/VENTOLIN HFA) 108 (90 BASE) MCG/ACT Inhaler Inhale 2 puffs into the lungs every 6 hours as needed for shortness of breath / dyspnea or wheezing 1 Inhaler 1     ANUCORT-HC 25 MG Suppository   0     omeprazole (PRILOSEC) 40 MG capsule Take 1 capsule (40 mg) by mouth daily Take 30-60 minutes before a meal. 90 capsule 3       Social History     Social History     Marital status:      Spouse name: Saul     Number of children: 2     Years of education: 14     Occupational History      [employer]     Social History Main Topics     Smoking status: Former Smoker     Packs/day: 1.00     Years: 25.00     Types: Cigarettes     Quit date: 4/3/2018     Smokeless tobacco: Never Used     Alcohol use 0.0 oz/week      Comment: occasionally     Drug use: No     Sexual activity: Yes     Partners: Male     Birth control/ protection: None      Comment: my  has a vasectomy     Other Topics Concern     Parent/Sibling W/ Cabg, Mi Or Angioplasty Before 65f 55m? No     Social History Narrative    Dairy/d 3-4 servings/d.     Caffeine 1 servings/d    Exercise 0 x week very active    Sunscreen used - Yes    Seatbelts used - Yes    Working smoke/CO detectors in the home - Yes    Guns stored in the home - No    Self Breast Exams - Yes    Self Testicular Exam - NA    Eye Exam up to date - Due for an appointment.     Dental Exam up to date - Due for an appointment.     Pap Smear up to date - Yes    Mammogram up to date - n/a    PSA up to date - NA    Dexa Scan up to date - NA    Flex Sig / Colonoscopy up to date - NA    Immunizations up to date - Yes     Abuse: Current or Past(Physical, Sexual or Emotional)- No    Do you feel safe in your environment - Yes    Saul DREA Blackman    9/21/07           Family History   Problem Relation Age of Onset     Hypertension Mother      Depression Mother      Hyperlipidemia Mother      DIABETES Father      never met that man     Hypertension Maternal Grandmother      CANCER Paternal Grandfather      lungs/throat     Psychotic Disorder Son      adhd/ocd     Asthma Other      Thyroid Disease Other      Depression Other        Review of Systems:  10 point ROS of systems including Constitutional, Eyes, Respiratory, Cardiovascular, Gastroenterology, Genitourinary, Integumentary, Muscularskeletal, Psychiatric were all negative except for pertinent positives noted in my HPI and in the PMH.      Exam:  /78 (BP Location: Right arm, Cuff Size: Adult Regular)  Pulse 63  Wt 178 lb (80.7 kg)  LMP 04/24/2018  SpO2 97%  BMI 30.55 kg/m2  General:  WNWD female, NAD  Alert  Oriented x 3  Gait:  Normal  Skin:  Normal skin turgor  HEENT:  NC/AT, EOMI  Lungs:  Good respiratory effort  Abdomen:  Non-tender, non-distended.  Pelvic exam:  Not performed  Extremities:  No clubbing, no cyanosis and no edema.      Assessment  Abnormal uterine bleeding       Plan  Together we reviewed the risks and benefits of medical versus surgical therapy.  Medical therapy reviewed included hormonal manipulation with OCP's, Patch, Ring, Depo, or IUD.   We reviewed the aspects of fibroid embolization, however, the ultrasound shows a very small fibroid, so this would be of little benefit.  We reviewed endometrial ablation versus hysterectomy.  Discussed that endometrial ablation is minimally invasive compared to hysterectomy but may not be definitive.  In addition, should she had the ablation, the endometrial evaluation might be indicated again, should bleeding irregularity occur; only about 34% of individuals might be amenorrheic after the ablation.    She has  stopped smoking, but only for 6 weeks so far.  Should cessation continue, then she might be able to use a combination hormone product.    In regards to the possibility of a hysterectomy as definitive therapy, she might consider it should the hysterectomy be performed as a TVH or LAVH approach so she might be able to return to daily activities and work sooner.    She will discuss with her  and decide.      TT 30 min   CT greater than 80%    Javier Huynh MD

## 2018-05-16 ENCOUNTER — THERAPY VISIT (OUTPATIENT)
Dept: CHIROPRACTIC MEDICINE | Facility: CLINIC | Age: 48
End: 2018-05-16
Payer: COMMERCIAL

## 2018-05-16 DIAGNOSIS — M99.02 THORACIC SEGMENT DYSFUNCTION: ICD-10-CM

## 2018-05-16 DIAGNOSIS — M99.05 SEGMENTAL DYSFUNCTION OF PELVIC REGION: Primary | ICD-10-CM

## 2018-05-16 DIAGNOSIS — M54.50 LUMBAGO: ICD-10-CM

## 2018-05-16 DIAGNOSIS — M62.838 SPASM OF MUSCLE: ICD-10-CM

## 2018-05-16 DIAGNOSIS — M99.03 SEGMENTAL DYSFUNCTION OF LUMBAR REGION: ICD-10-CM

## 2018-05-16 DIAGNOSIS — M99.01 CERVICAL SEGMENT DYSFUNCTION: ICD-10-CM

## 2018-05-16 DIAGNOSIS — M54.2 CERVICALGIA: ICD-10-CM

## 2018-05-16 PROCEDURE — 98941 CHIROPRACT MANJ 3-4 REGIONS: CPT | Mod: AT | Performed by: CHIROPRACTOR

## 2018-05-16 NOTE — PROGRESS NOTES
Visit #:  8 of 8, based on treatment plan    Subjective:  Carol Ann Anguiano is a 47 year old female who is seen in f/u up for:     1. Segmental dysfunction of pelvic region    2. Lumbago    3. Segmental dysfunction of lumbar region    4. Spasm of muscle    5. Somatic dysfunction of lumbar region      Since last visit on 5/9/2018,  Carol Ann Anguiano reports the following changes: Pain immediately after last treatment: 1/10 and their pain level today 2/10.   Carol Ann reports that she has been doing well until this past Monday when she woke up and her low back was stiff and sore.  She continues to perform the exercises as prescribed by pt. Neck and upper yin are feeling a bit stif and sore but overall she is feeling improved.    Area of chief complaint:  Lumbar :  Symptoms are graded at 2/10. The quality is described as stiff.  Motion has increased, but is still not normal. Patient feels that they are improved due to a reduction in symptoms overall.      Objective:  The following was observed:    P: palpatory tenderness, piriformis R>>L, sub occipital R    A: static palpation demonstrates intersegmental asymmetry , cervical, thoracic, lumbar, pelvis    R: motion palpation notes restricted motion, :  C6 Right rotation restricted  C7 Right rotation restricted  T1 Left rotation restricted  T2 Left rotation restricted  T6 Extension restriction  T7 Extension restriction  T8 Extension restriction  L4 Right rotation restricted  L5 Right rotation restricted  PSIS Right Extension restriction    T: hypertonicity at: Piriformis and Sub-occipital R>>L      Assessment:    Segmental spinal dysfunction/restrictions found at:  C6   C7   T1   T2   T5  T6  T7  L4  L5  PSIS Right    Diagnoses:      1. Segmental dysfunction of pelvic region    2. Lumbago    3. Segmental dysfunction of lumbar region    4. Spasm of muscle    5. Thoracic segment dysfunction    6. Head ache    7. Cervical segment dysfunction        Patient's  condition:  Patient had restrictions pre-manipulation    Treatment effectiveness:  Post manipulation there is better intersegmental movement and Patient claims to feel looser post manipulation      Procedures:  CMT:  00917 Chiropractic manipulative treatment 3-4 regions performed   Cervical: Diversified and Activator,C6, C7 , Prone  Thoracic: Diversified, T1, T2, T5, T6, T7, Prone  Lumbar: Activator, L4, L5, Prone  Pelvis: Drop Table, PSIS Right , Prone    Modalities:  78106: MSTM:  To Piriformis  for 5 min    Therapeutic procedures:  None      Prognosis: Good    Progress towards Goals: Patient is making progress towards the goal     Response to Treatment:   Reduction in symptoms as reported by patient      Recommendations:    Instructions:ice 20 minutes every other hour as needed    Follow-up:  Return to care in one week

## 2018-07-23 ENCOUNTER — THERAPY VISIT (OUTPATIENT)
Dept: CHIROPRACTIC MEDICINE | Facility: CLINIC | Age: 48
End: 2018-07-23
Payer: COMMERCIAL

## 2018-07-23 DIAGNOSIS — M99.01 CERVICAL SEGMENT DYSFUNCTION: ICD-10-CM

## 2018-07-23 DIAGNOSIS — M99.05 SEGMENTAL DYSFUNCTION OF PELVIC REGION: Primary | ICD-10-CM

## 2018-07-23 DIAGNOSIS — M62.838 SPASM OF MUSCLE: ICD-10-CM

## 2018-07-23 DIAGNOSIS — M54.50 LUMBAGO: ICD-10-CM

## 2018-07-23 DIAGNOSIS — M54.2 CERVICALGIA: ICD-10-CM

## 2018-07-23 DIAGNOSIS — M99.03 SEGMENTAL DYSFUNCTION OF LUMBAR REGION: ICD-10-CM

## 2018-07-23 DIAGNOSIS — M99.02 THORACIC SEGMENT DYSFUNCTION: ICD-10-CM

## 2018-07-23 PROCEDURE — 98941 CHIROPRACT MANJ 3-4 REGIONS: CPT | Mod: AT | Performed by: CHIROPRACTOR

## 2018-07-23 NOTE — PROGRESS NOTES
Visit #:  9    Subjective:  Carol Ann Anguiano is a 47 year old female who is seen in f/u up for:     1. Segmental dysfunction of pelvic region    2. Lumbago    3. Segmental dysfunction of lumbar region    4. Spasm of muscle    5. Somatic dysfunction of lumbar region      Since last visit on 5/16/2018,  Carol Ann Anguiano reports the following changes: Pain immediately after last treatment: 2/10 and their pain level today 4/10.   Carol Ann reports that she had been doing well but spent a week in a cabin camping and this really flared her back up.  She is having low back and neck pain.    Area of chief complaint:  Lumbar and cervical :  Symptoms are graded at 4/10. The quality is described as stiff.  Motion has increased, but is still not normal. Patient feels that they are improved due to a reduction in symptoms overall.      Objective:  The following was observed:    P: palpatory tenderness, piriformis R>>L, sub occipital R    A: static palpation demonstrates intersegmental asymmetry , cervical, thoracic, lumbar, pelvis    R: motion palpation notes restricted motion, :  C6 Right rotation restricted  C7 Right rotation restricted  T1 Left rotation restricted  T2 Left rotation restricted  T6 Extension restriction  T7 Extension restriction  T8 Extension restriction  L4 Right rotation restricted  L5 Right rotation restricted  PSIS Right Extension restriction    T: hypertonicity at: Piriformis and Sub-occipital R>>L      Assessment:    Segmental spinal dysfunction/restrictions found at:  C6   C7   T1   T2   T5  T6  T7  L4  L5  PSIS Right    Diagnoses:      1. Segmental dysfunction of pelvic region    2. Lumbago    3. Segmental dysfunction of lumbar region    4. Spasm of muscle    5. Thoracic segment dysfunction    6. Head ache    7. Cervical segment dysfunction        Patient's condition:  Patient had restrictions pre-manipulation    Treatment effectiveness:  Post manipulation there is better intersegmental movement and  Patient claims to feel looser post manipulation      Procedures:  CMT:  02567 Chiropractic manipulative treatment 3-4 regions performed   Cervical: Diversified and Activator,C6, C7 , Prone  Thoracic: Diversified, T1, T2, T5, T6, T7, Prone  Lumbar: Activator, L4, L5, Prone  Pelvis: Drop Table, PSIS Right , Prone    Modalities:  38274: MSTM:  To Piriformis  for 5 min    Therapeutic procedures:  None      Prognosis: Good    Progress towards Goals: Patient is making progress towards the goal     Response to Treatment:   Reduction in symptoms as reported by patient      Recommendations:    Instructions:ice 20 minutes every other hour as needed    Follow-up:  Return to care in one week

## 2018-08-13 ENCOUNTER — THERAPY VISIT (OUTPATIENT)
Dept: CHIROPRACTIC MEDICINE | Facility: CLINIC | Age: 48
End: 2018-08-13
Payer: COMMERCIAL

## 2018-08-13 DIAGNOSIS — M99.03 SEGMENTAL DYSFUNCTION OF LUMBAR REGION: ICD-10-CM

## 2018-08-13 DIAGNOSIS — M54.50 LUMBAGO: ICD-10-CM

## 2018-08-13 DIAGNOSIS — M62.838 SPASM OF MUSCLE: ICD-10-CM

## 2018-08-13 DIAGNOSIS — M99.05 SEGMENTAL DYSFUNCTION OF PELVIC REGION: Primary | ICD-10-CM

## 2018-08-13 DIAGNOSIS — M99.02 THORACIC SEGMENT DYSFUNCTION: ICD-10-CM

## 2018-08-13 PROCEDURE — 98941 CHIROPRACT MANJ 3-4 REGIONS: CPT | Mod: AT | Performed by: CHIROPRACTOR

## 2018-08-13 NOTE — PROGRESS NOTES
Visit #:  9    Subjective:  Carol Ann Anguiano is a 47 year old female who is seen in f/u up for:     1. Segmental dysfunction of pelvic region    2. Lumbago    3. Segmental dysfunction of lumbar region    4. Spasm of muscle    5. Somatic dysfunction of thoracic region      Since last visit on 7/23/2018,  Carol Ann Anguiano reports the following changes: Pain immediately after last treatment: 2/10 and their pain level today 4/10.   Carol Ann reports that she had been doing well but her low back seemed flare up this past weekend she was sitting in a lawn chair and the crossed her legs and felt pina in her low back.     Area of chief complaint:  Lumbar and cervical :  Symptoms are graded at 4/10. The quality is described as stiff.  Motion has increased, but is still not normal. Patient feels that they are improved due to a reduction in symptoms overall.      Objective:  The following was observed:    P: palpatory tenderness, piriformis R>>L, sub occipital R    A: static palpation demonstrates intersegmental asymmetry , cervical, thoracic, lumbar, pelvis    R: motion palpation notes restricted motion, :  C6 Right rotation restricted  C7 Right rotation restricted  T1 Left rotation restricted  T2 Left rotation restricted  T6 Extension restriction  T7 Extension restriction  T8 Extension restriction  L4 Right rotation restricted  L5 Right rotation restricted  PSIS Right Extension restriction    T: hypertonicity at: Piriformis and Sub-occipital R>>L      Assessment:    Segmental spinal dysfunction/restrictions found at:  C6   C7   T1   T2   T5  T6  T7  L4  L5  PSIS Right    Diagnoses:      1. Segmental dysfunction of pelvic region    2. Lumbago    3. Segmental dysfunction of lumbar region    4. Spasm of muscle    5. Thoracic segment dysfunction    6. Head ache    7. Cervical segment dysfunction        Patient's condition:  Patient had restrictions pre-manipulation    Treatment effectiveness:  Post manipulation there is better  intersegmental movement and Patient claims to feel looser post manipulation      Procedures:  CMT:  55256 Chiropractic manipulative treatment 3-4 regions performed   Cervical: Diversified and Activator,C6, C7 , Prone  Thoracic: Diversified, T1, T2, T5, T6, T7, Prone  Lumbar: Activator, L4, L5, Prone  Pelvis: Drop Table, PSIS Right , Prone    Modalities:  80099: MSTM:  To Piriformis  for 5 min    Therapeutic procedures:  None      Prognosis: Good    Progress towards Goals: Patient is making progress towards the goal     Response to Treatment:   Reduction in symptoms as reported by patient      Recommendations:    Instructions:ice 20 minutes every other hour as needed    Follow-up:  Return to care in one week

## 2019-02-04 ENCOUNTER — MYC REFILL (OUTPATIENT)
Dept: FAMILY MEDICINE | Facility: CLINIC | Age: 49
End: 2019-02-04

## 2019-02-04 DIAGNOSIS — J45.20 INTERMITTENT ASTHMA, UNCOMPLICATED: ICD-10-CM

## 2019-02-04 NOTE — TELEPHONE ENCOUNTER
"Requested Prescriptions   Pending Prescriptions Disp Refills     albuterol (PROAIR HFA/PROVENTIL HFA/VENTOLIN HFA) 108 (90 Base) MCG/ACT inhaler  Last Written Prescription Date:  4/20/2017  Last Fill Quantity: 1 inhaler,  # refills: 1   Last office visit: 11/13/2017 with prescribing provider:  RADHA Charles   Future Office Visit:   Next 5 appointments (look out 90 days)    Feb 26, 2019  4:20 PM CST  Gumaro Scherer with Roby Charles PA-C  [facility] [address] [place] [id number] [phone]   Mar 13, 2019  3:00 PM CDT  [facility] [address] [place] [id number] [phone]       1 Inhaler 1     Sig: Inhale 2 puffs into the lungs every 6 hours as needed for shortness of breath / dyspnea or wheezing    Asthma Maintenance Inhalers - Anticholinergics Failed - 2/4/2019  8:52 AM       Failed - Asthma control assessment score within normal limits in last 6 months    Please review ACT score.     ACT Total Scores 10/7/2016 3/28/2017 11/13/2017   ACT TOTAL SCORE - - -   ASTHMA ER VISITS - - -   ASTHMA HOSPITALIZATIONS - - -   ACT TOTAL SCORE (Goal Greater than or Equal to 20) 22 18 23   In the past 12 months, how many times did you visit the emergency room for your asthma without being admitted to the hospital? 0 0 0   In the past 12 months, how many times were you hospitalized overnight because of your asthma? 0 0 0          Passed - Patient is age 12 years or older       Passed - Medication is active on med list       Passed - Recent (6 mo) or future (30 days) visit within the authorizing provider's specialty    Patient had office visit in the last 6 months or has a visit in the next 30 days with authorizing provider or within the authorizing provider's specialty.  See \"Patient Info\" tab in inbasket, or \"Choose Columns\" in Meds & Orders section of the " refill encounter.

## 2019-02-06 RX ORDER — ALBUTEROL SULFATE 90 UG/1
2 AEROSOL, METERED RESPIRATORY (INHALATION) EVERY 6 HOURS PRN
Qty: 1 INHALER | Refills: 1 | Status: SHIPPED | OUTPATIENT
Start: 2019-02-06 | End: 2019-06-05

## 2019-02-06 NOTE — TELEPHONE ENCOUNTER
Patient hasn't been seen here since 11/13/17.  Okay to fill inhaler x 1? MyChart visit scheduled 2/26 (thought I'm not sure what this is for as there are no notes). Prefer to schedule in-office visit?    Notes indicate she is leaving for [place] on 2/9/19 and needs refill.    Aldair Patel RN

## 2019-03-13 ENCOUNTER — OFFICE VISIT (OUTPATIENT)
Dept: AUDIOLOGY | Facility: CLINIC | Age: 49
End: 2019-03-13
Payer: COMMERCIAL

## 2019-03-13 ENCOUNTER — OFFICE VISIT (OUTPATIENT)
Dept: OTOLARYNGOLOGY | Facility: CLINIC | Age: 49
End: 2019-03-13
Payer: COMMERCIAL

## 2019-03-13 VITALS
OXYGEN SATURATION: 99 % | BODY MASS INDEX: 30.9 KG/M2 | RESPIRATION RATE: 16 BRPM | WEIGHT: 180 LBS | SYSTOLIC BLOOD PRESSURE: 132 MMHG | DIASTOLIC BLOOD PRESSURE: 84 MMHG | HEART RATE: 79 BPM

## 2019-03-13 DIAGNOSIS — Z53.9 ERRONEOUS ENCOUNTER--DISREGARD: Primary | ICD-10-CM

## 2019-03-13 DIAGNOSIS — H61.23 BILATERAL IMPACTED CERUMEN: ICD-10-CM

## 2019-03-13 DIAGNOSIS — H60.8X3 CHRONIC ECZEMATOUS OTITIS EXTERNA OF BOTH EARS: Primary | ICD-10-CM

## 2019-03-13 PROCEDURE — 99213 OFFICE O/P EST LOW 20 MIN: CPT | Mod: 25 | Performed by: OTOLARYNGOLOGY

## 2019-03-13 PROCEDURE — 69210 REMOVE IMPACTED EAR WAX UNI: CPT | Performed by: OTOLARYNGOLOGY

## 2019-03-13 RX ORDER — FLUOCINOLONE ACETONIDE 0.11 MG/ML
OIL AURICULAR (OTIC)
Qty: 20 ML | Refills: 1 | Status: SHIPPED | OUTPATIENT
Start: 2019-03-13 | End: 2020-10-31

## 2019-03-13 NOTE — LETTER
3/13/2019         RE: Carol Ann Anguiano  [id number]   [place] [address]        Dear Colleague,    Thank you for referring your patient, Carol Ann Anguiano, to the [facility]. Please see a copy of my visit note below.    Chief Complaint - bilateral ear itching    History of Present Illness - Carol Ann Anguiano is a 48 year old female who presents to me today with ear itching, both sides, left is worse. Some pain now on the left.  It has been present and noticeable for approximately years, but worsening. dermotic helped some, but seems less effective with use. Ketoconazole didn't help. The patient has noted some otorrhea. No hearing loss. She itches them a lot. No bleeding. She uses q-tips sometimes. No other skin lesions or psoriasis.    Past Medical History -   Patient Active Problem List   Diagnosis     Mild intermittent asthma     CARDIOVASCULAR SCREENING; LDL GOAL LESS THAN 160     Globus pharyngeus     Tobacco use disorder     Hypothyroidism, unspecified hypothyroidism type     Abnormal uterine bleeding     JONATHON I (cervical intraepithelial neoplasia I)     Nephrolithiasis     Low back pain       Current Medications -   Current Outpatient Medications:      albuterol (PROAIR HFA/PROVENTIL HFA/VENTOLIN HFA) 108 (90 Base) MCG/ACT inhaler, Inhale 2 puffs into the lungs every 6 hours as needed for shortness of breath / dyspnea or wheezing, Disp: 1 Inhaler, Rfl: 1     ANUCORT-HC 25 MG Suppository, , Disp: , Rfl: 0     levothyroxine (SYNTHROID/LEVOTHROID) 200 MCG tablet, Take 1 tablet (200 mcg) by mouth daily, Disp: 90 tablet, Rfl: 0     omeprazole (PRILOSEC) 40 MG capsule, Take 1 capsule (40 mg) by mouth daily Take 30-60 minutes before a meal., Disp: 90 capsule, Rfl: 3    Allergies -   Allergies   Allergen Reactions     Chantix [Varenicline]      Stomach cramps  Stomach cramps      Eryc [Erythromycin]      dry heaves  nausea     Erythromycin      N/V       Social History -   Social History      Socioeconomic History     Marital status:      Spouse name: Saul     Number of children: 2     Years of education: 14     Highest education level: None   Occupational History     Occupation:      Employer: [employer]   Social Needs     Financial resource strain: None     Food insecurity:     Worry: None     Inability: None     Transportation needs:     Medical: None     Non-medical: None   Tobacco Use     Smoking status: Former Smoker     Packs/day: 1.00     Years: 25.00     Pack years: 25.00     Types: Cigarettes     Last attempt to quit: 4/3/2018     Years since quittin.9     Smokeless tobacco: Never Used   Substance and Sexual Activity     Alcohol use: Yes     Alcohol/week: 0.0 oz     Comment: occasionally     Drug use: No     Types: Marijuana     Sexual activity: Yes     Partners: Male     Birth control/protection: None     Comment: my  has a vasectomy   Lifestyle     Physical activity:     Days per week: None     Minutes per session: None     Stress: None   Relationships     Social connections:     Talks on phone: None     Gets together: None     Attends [employer] service: None     Active member of club or organization: None     Attends meetings of clubs or organizations: None     Relationship status: None     Intimate partner violence:     Fear of current or ex partner: None     Emotionally abused: None     Physically abused: None     Forced sexual activity: None   Other Topics Concern     Parent/sibling w/ CABG, MI or angioplasty before 65F 55M? No   Social History Narrative    Dairy/d 3-4 servings/d.     Caffeine 1 servings/d    Exercise 0 x week very active    Sunscreen used - Yes    Seatbelts used - Yes    Working smoke/CO detectors in the home - Yes    Guns stored in the home - No    Self Breast Exams - Yes    Self Testicular Exam - NA    Eye Exam up to date - Due for an appointment.     Dental Exam up to date - Due for an appointment.     Pap Smear up  to date - Yes    Mammogram up to date - n/a    PSA up to date - NA    Dexa Scan up to date - NA    Flex Sig / Colonoscopy up to date - NA    Immunizations up to date - Yes    Abuse: Current or Past(Physical, Sexual or Emotional)- No    Do you feel safe in your environment - Yes    Saul PriceDREA santos    9/[birth year]/07           Family History -   Family History   Problem Relation Age of Onset     Hypertension Mother      Depression Mother      Hyperlipidemia Mother      Diabetes Father         never met that man     Hypertension Maternal Grandmother      Cancer Paternal Grandfather         lungs/throat     Psychotic Disorder Son         adhd/ocd     Asthma Other      Thyroid Disease Other      Depression Other        Review of Systems - As per HPI and PMHx, otherwise 7 system review of the head and neck negative.    Physical Exam  /84   Pulse 79   Resp 16   Wt 81.6 kg (180 lb)   SpO2 99%   BMI 30.90 kg/m     General - The patient is in no distress.  Alert and oriented to person and place, answers questions and cooperates with examination appropriately.   Voice and Breathing - The patient was breathing comfortably without the use of accessory muscles. There was no wheezing, stridor, or stertor.  The patients voice was clear and strong.  Ears -both ear canals were impacted with cerumen see below.  On the left side the ear canal showed erythema and edema of the lateral canal.  However there is no pus or purulence to suggest infection but rather this seems to be inflamed eczema.  After cleaning the left tympanic membrane is intact.  No medial canal fluid or edema.  No middle ear effusion.  The right ear canal was impacted and I cleaned this as well.  I do not see the same edema or erythema on the right as I did the left.  Tympanic membrane on the right and intact no middle ear effusion.  Eyes - Extraocular movements intact.  Sclera were not icteric or injected.  Neck - Palpation of the occipital, submental,  submandibular, internal jugular chain, and supraclavicular nodes did not demonstrate any abnormal lymph nodes or masses. No parotid masses. Palpation of the thyroid was soft and smooth, with no nodules or goiter appreciated.  The trachea was mobile and midline.  Neurological - Cranial nerves 2 through 12 were grossly intact. House-Brackmann grade 1 out of 6 bilaterally.    Cerumen Removal - Patient here for ear cleaning. Denies ear pain, drainage, and infection.     Physical Exam and Procedure  Ears - On examination of the ears, I found that both ears were impacted with cerumen.  Therefore, I positioned the patient in the examination chair in a semi-supine position. I used the binocular surgical microscope to perform cerumen removal.  On the right side, I began by using a cerumen loop to gently lift the edges of the cerumen mass away from the walls of the external canal.  Once I did this, I was able to pull away fragments of wax and debris. I removed all the wax and debri.  The tympanic membrane was intact, no sign of perforation or middle ear effusion.    I turned my attention to the left side once again using the binocular surgical microscope to perform cerumen removal.  I began by using a cerumen loop to gently lift the edges of the cerumen mass away from the walls of the external canal.  Once I did this, I was able to pull away fragments of wax and debris. I removed all the wax and debri.  There is left lateral ear canal edema and erythema but no purulence.  The tympanic membrane was intact, no sign of perforation or middle ear effusion.    Assessment and Plan - Carol Ann Anguiano is a 48 year old female has bilateral chronic eczematous otitis externa.  She also had cerumen impaction I cleaned this today.  Her left ear is worse than the right.  I do not think she has an acute infection.  I think she should try hydrocortisone ointment 1% intermittently as needed or she could also try the Dermotic again.  I  provided a new prescription with refills.    Jasper Tsang MD  Otolaryngology  [facility]      Again, thank you for allowing me to participate in the care of your patient.        Sincerely,        Jasper Tsang MD

## 2019-03-13 NOTE — PROGRESS NOTES
Chief Complaint - bilateral ear itching    History of Present Illness - Carol Ann Anguiano is a 48 year old female who presents to me today with ear itching, both sides, left is worse. Some pain now on the left.  It has been present and noticeable for approximately years, but worsening. dermotic helped some, but seems less effective with use. Ketoconazole didn't help. The patient has noted some otorrhea. No hearing loss. She itches them a lot. No bleeding. She uses q-tips sometimes. No other skin lesions or psoriasis.    Past Medical History -   Patient Active Problem List   Diagnosis     Mild intermittent asthma     CARDIOVASCULAR SCREENING; LDL GOAL LESS THAN 160     Globus pharyngeus     Tobacco use disorder     Hypothyroidism, unspecified hypothyroidism type     Abnormal uterine bleeding     JONATHON I (cervical intraepithelial neoplasia I)     Nephrolithiasis     Low back pain       Current Medications -   Current Outpatient Medications:      albuterol (PROAIR HFA/PROVENTIL HFA/VENTOLIN HFA) 108 (90 Base) MCG/ACT inhaler, Inhale 2 puffs into the lungs every 6 hours as needed for shortness of breath / dyspnea or wheezing, Disp: 1 Inhaler, Rfl: 1     ANUCORT-HC 25 MG Suppository, , Disp: , Rfl: 0     levothyroxine (SYNTHROID/LEVOTHROID) 200 MCG tablet, Take 1 tablet (200 mcg) by mouth daily, Disp: 90 tablet, Rfl: 0     omeprazole (PRILOSEC) 40 MG capsule, Take 1 capsule (40 mg) by mouth daily Take 30-60 minutes before a meal., Disp: 90 capsule, Rfl: 3    Allergies -   Allergies   Allergen Reactions     Chantix [Varenicline]      Stomach cramps  Stomach cramps      Eryc [Erythromycin]      dry heaves  nausea     Erythromycin      N/V       Social History -   Social History     Socioeconomic History     Marital status:      Spouse name: Saul     Number of children: 2     Years of education: 14     Highest education level: None   Occupational History     Occupation:      Employer: [employer]   Social Needs     Financial resource strain: None     Food insecurity:     Worry: None     Inability: None     Transportation needs:     Medical: None     Non-medical: None   Tobacco Use     Smoking status: Former Smoker     Packs/day: 1.00     Years: 25.00     Pack years: 25.00     Types: Cigarettes     Last attempt to quit: 4/3/2018     Years since quittin.9     Smokeless tobacco: Never Used   Substance and Sexual Activity     Alcohol use: Yes     Alcohol/week: 0.0 oz     Comment: occasionally     Drug use: No     Types: Marijuana     Sexual activity: Yes     Partners: Male     Birth control/protection: None     Comment: my  has a vasectomy   Lifestyle     Physical activity:     Days per week: None     Minutes per session: None     Stress: None   Relationships     Social connections:     Talks on phone: None     Gets together: None     Attends Yazidism service: None     Active member of club or organization: None     Attends meetings of clubs or organizations: None     Relationship status: None     Intimate partner violence:     Fear of current or ex partner: None     Emotionally abused: None     Physically abused: None     Forced sexual activity: None   Other Topics Concern     Parent/sibling w/ CABG, MI or angioplasty before 65F 55M? No   Social History Narrative    Dairy/d 3-4 servings/d.     Caffeine 1 servings/d    Exercise 0 x week very active    Sunscreen used - Yes    Seatbelts used - Yes    Working smoke/CO detectors in the home - Yes    Guns stored in the home - No    Self Breast Exams - Yes    Self Testicular Exam - NA    Eye Exam up to date - Due for an appointment.     Dental Exam up to date - Due for an appointment.     Pap Smear up to date - Yes    Mammogram up to date - n/a    PSA up to date - NA    Dexa Scan up to date - NA    Flex Sig / Colonoscopy up to date - NA    Immunizations up to date - Yes    Abuse: Current or Past(Physical, Sexual or Emotional)- No    Do you feel  safe in your environment - Yes    Saul Blackman, MA    9/21/07           Family History -   Family History   Problem Relation Age of Onset     Hypertension Mother      Depression Mother      Hyperlipidemia Mother      Diabetes Father         never met that man     Hypertension Maternal Grandmother      Cancer Paternal Grandfather         lungs/throat     Psychotic Disorder Son         adhd/ocd     Asthma Other      Thyroid Disease Other      Depression Other        Review of Systems - As per HPI and PMHx, otherwise 7 system review of the head and neck negative.    Physical Exam  /84   Pulse 79   Resp 16   Wt 81.6 kg (180 lb)   SpO2 99%   BMI 30.90 kg/m    General - The patient is in no distress.  Alert and oriented to person and place, answers questions and cooperates with examination appropriately.   Voice and Breathing - The patient was breathing comfortably without the use of accessory muscles. There was no wheezing, stridor, or stertor.  The patients voice was clear and strong.  Ears -both ear canals were impacted with cerumen see below.  On the left side the ear canal showed erythema and edema of the lateral canal.  However there is no pus or purulence to suggest infection but rather this seems to be inflamed eczema.  After cleaning the left tympanic membrane is intact.  No medial canal fluid or edema.  No middle ear effusion.  The right ear canal was impacted and I cleaned this as well.  I do not see the same edema or erythema on the right as I did the left.  Tympanic membrane on the right and intact no middle ear effusion.  Eyes - Extraocular movements intact.  Sclera were not icteric or injected.  Neck - Palpation of the occipital, submental, submandibular, internal jugular chain, and supraclavicular nodes did not demonstrate any abnormal lymph nodes or masses. No parotid masses. Palpation of the thyroid was soft and smooth, with no nodules or goiter appreciated.  The trachea was mobile and  midline.  Neurological - Cranial nerves 2 through 12 were grossly intact. House-Brackmann grade 1 out of 6 bilaterally.    Cerumen Removal - Patient here for ear cleaning. Denies ear pain, drainage, and infection.     Physical Exam and Procedure  Ears - On examination of the ears, I found that both ears were impacted with cerumen.  Therefore, I positioned the patient in the examination chair in a semi-supine position. I used the binocular surgical microscope to perform cerumen removal.  On the right side, I began by using a cerumen loop to gently lift the edges of the cerumen mass away from the walls of the external canal.  Once I did this, I was able to pull away fragments of wax and debris. I removed all the wax and debri.  The tympanic membrane was intact, no sign of perforation or middle ear effusion.    I turned my attention to the left side once again using the binocular surgical microscope to perform cerumen removal.  I began by using a cerumen loop to gently lift the edges of the cerumen mass away from the walls of the external canal.  Once I did this, I was able to pull away fragments of wax and debris. I removed all the wax and debri.  There is left lateral ear canal edema and erythema but no purulence.  The tympanic membrane was intact, no sign of perforation or middle ear effusion.    Assessment and Plan - Carol Ann Anguiano is a 48 year old female has bilateral chronic eczematous otitis externa.  She also had cerumen impaction I cleaned this today.  Her left ear is worse than the right.  I do not think she has an acute infection.  I think she should try hydrocortisone ointment 1% intermittently as needed or she could also try the Dermotic again.  I provided a new prescription with refills.    Jasper Tsang MD  Otolaryngology  [facility]

## 2019-03-13 NOTE — PATIENT INSTRUCTIONS
General Scheduling Information  To schedule your CT/MRI scan, please contact Christiano Morgan at [phone]   [address] [place] [address]    To schedule your Surgery, please contact our Specialty Schedulers at [phone]    ENT Clinic Locations Clinic Hours Telephone Number     Pepe Chavarria  [address] [place] [address]   Tuesday:       8:00am -- 4:00pm    Wednesday:  8:00am - 4:00pm   To schedule an appointment with   Dr. Tsang,   please contact our   Specialty Scheduling Department at:     [phone]       Pepe Red  [address] [place] [address]   Friday:          8:00am - 4:00pm         Urgent Care Locations Clinic Hours Telephone Numbers     Pepe Gonzalez  [address] [place] [address]     Monday-Friday:     11:00pm - 9:00pm    Saturday-Sunday:  9:00am - 5:00pm   [phone]     Pepe Red  [address] [place] [address]     Monday-Friday:      5:00pm - 9:00pm     Saturday-Sunday:  9:00am - 5:00pm   [phone]

## 2019-03-25 ENCOUNTER — MYC REFILL (OUTPATIENT)
Dept: FAMILY MEDICINE | Facility: CLINIC | Age: 49
End: 2019-03-25

## 2019-03-25 DIAGNOSIS — E03.9 HYPOTHYROIDISM, UNSPECIFIED TYPE: ICD-10-CM

## 2019-03-25 NOTE — TELEPHONE ENCOUNTER
"Requested Prescriptions   Pending Prescriptions Disp Refills     levothyroxine (SYNTHROID/LEVOTHROID) 200 MCG tablet  Last Written Prescription Date:  11/29/2018  Last Fill Quantity: 90 tabs,  # refills: 0   Last office visit: 11/13/2017 with prescribing provider:  Melvin   Future Office Visit:   Next 5 appointments (look out 90 days)    Mar 29, 2019  2:00 PM CDT  Gumaro Scherer with Roby Charles PA-C  [facility] ([address] [address] [phone]          90 tablet 0     Sig: Take 1 tablet (200 mcg) by mouth daily    Thyroid Protocol Failed - 3/25/2019  2:39 PM       Failed - Normal TSH on file in past 12 months    Recent Labs   Lab Test 10/06/17  1541   TSH 0.13*             Passed - Patient is 12 years or older       Passed - Recent (12 mo) or future (30 days) visit within the authorizing provider's specialty    Patient had office visit in the last 12 months or has a visit in the next 30 days with authorizing provider or within the authorizing provider's specialty.  See \"Patient Info\" tab in inbasket, or \"Choose Columns\" in Meds & Orders section of the refill encounter.             Passed - Medication is active on med list       Passed - No active pregnancy on record    If patient is pregnant or has had a positive pregnancy test, please check TSH.         Passed - No positive pregnancy test in past 12 months    If patient is pregnant or has had a positive pregnancy test, please check TSH.            "

## 2019-03-28 RX ORDER — LEVOTHYROXINE SODIUM 200 UG/1
200 TABLET ORAL DAILY
Qty: 90 TABLET | Refills: 0 | Status: SHIPPED | OUTPATIENT
Start: 2019-03-28 | End: 2019-07-04

## 2019-03-29 ENCOUNTER — OFFICE VISIT (OUTPATIENT)
Dept: FAMILY MEDICINE | Facility: CLINIC | Age: 49
End: 2019-03-29
Payer: COMMERCIAL

## 2019-03-29 VITALS
DIASTOLIC BLOOD PRESSURE: 80 MMHG | WEIGHT: 173 LBS | HEART RATE: 72 BPM | HEIGHT: 64 IN | TEMPERATURE: 98.2 F | BODY MASS INDEX: 29.53 KG/M2 | SYSTOLIC BLOOD PRESSURE: 114 MMHG

## 2019-03-29 DIAGNOSIS — R11.0 NAUSEA: ICD-10-CM

## 2019-03-29 DIAGNOSIS — L82.0 INFLAMED SEBORRHEIC KERATOSIS: ICD-10-CM

## 2019-03-29 DIAGNOSIS — Z12.31 VISIT FOR SCREENING MAMMOGRAM: ICD-10-CM

## 2019-03-29 DIAGNOSIS — B07.8 COMMON WART: ICD-10-CM

## 2019-03-29 DIAGNOSIS — J06.9 VIRAL URI: ICD-10-CM

## 2019-03-29 DIAGNOSIS — R05.9 COUGH: ICD-10-CM

## 2019-03-29 DIAGNOSIS — E03.9 HYPOTHYROIDISM, UNSPECIFIED TYPE: Primary | ICD-10-CM

## 2019-03-29 LAB — T3FREE SERPL-MCNC: 1.6 PG/ML (ref 2.3–4.2)

## 2019-03-29 PROCEDURE — 84443 ASSAY THYROID STIM HORMONE: CPT | Performed by: PHYSICIAN ASSISTANT

## 2019-03-29 PROCEDURE — 84481 FREE ASSAY (FT-3): CPT | Performed by: PHYSICIAN ASSISTANT

## 2019-03-29 PROCEDURE — 84439 ASSAY OF FREE THYROXINE: CPT | Performed by: PHYSICIAN ASSISTANT

## 2019-03-29 PROCEDURE — 99214 OFFICE O/P EST MOD 30 MIN: CPT | Mod: 25 | Performed by: PHYSICIAN ASSISTANT

## 2019-03-29 PROCEDURE — 17110 DESTRUCTION B9 LES UP TO 14: CPT | Performed by: PHYSICIAN ASSISTANT

## 2019-03-29 PROCEDURE — 36415 COLL VENOUS BLD VENIPUNCTURE: CPT | Performed by: PHYSICIAN ASSISTANT

## 2019-03-29 RX ORDER — CODEINE PHOSPHATE AND GUAIFENESIN 10; 100 MG/5ML; MG/5ML
1-2 SOLUTION ORAL EVERY 4 HOURS PRN
Qty: 120 ML | Refills: 0 | Status: SHIPPED | OUTPATIENT
Start: 2019-03-29 | End: 2020-02-26

## 2019-03-29 RX ORDER — OMEPRAZOLE 40 MG/1
40 CAPSULE, DELAYED RELEASE ORAL DAILY
Qty: 90 CAPSULE | Refills: 3 | Status: SHIPPED | OUTPATIENT
Start: 2019-03-29 | End: 2022-04-07

## 2019-03-29 ASSESSMENT — MIFFLIN-ST. JEOR: SCORE: 1404.72

## 2019-03-29 NOTE — PROGRESS NOTES
SUBJECTIVE:   Carol Ann Anguiano is a 48 year old female who presents to clinic today for the following health issues:    Cold symptoms - 4th day of symptoms.  Patient thinks she is getting over the flu.  Very settled in the chest, lots of coughing.  Fever the first day with body aches through yesterday morning.  She feels the best today that she has felt in 5 days. No fevers today - feels a good deal better. Cough is keeping her awake.     Hypothyroidism Follow-up      Since last visit, patient describes the following symptoms: Weight stable, no hair loss, no skin changes, no constipation, no loose stools and weight gain of several lbs      Amount of exercise or physical activity: 4-5 days/week for an average of 10,000 steps    Problems taking medications regularly: Yes,  Struggles sometimes because she doesn't get it refilled before she runs out, then ends up not getting it refilled right away and going a few weeks without    Medication side effects: none    Diet: regular (no restrictions)    GERD/Heartburn  Onset: 1-2 months     Description:     Burning in chest: YES    Intensity: moderate    Progression of Symptoms: same    Accompanying Signs & Symptoms:  Does it feel like food gets stuck: no  Nausea: YES  Vomiting (bloody?): no  Abdominal Pain: YES  Black-Tarry stools: no:  Bloody stools: no    History:   Previous ulcers: YES- Gurjit thinks she has an ulcer    Precipitating factors:   Caffeine use: YES- 1 cup of coffee daily  Alcohol use: YES- occasional, about 4 times per year  NSAID/Aspirin use: YES- aleve  Tobacco use: YES  Worse with tomato based foods.    Alleviating factors:  She was doing well on Omeprazole but stopped this a few months ago. Symptoms started after stopping omeprazole.     Therapies Tried and outcome:chewable antacid help relieve some of it    WART(S)  Onset: 2 weeks    Description:   Location: right index finger  Number of warts: 1  Painful: YES- if pushed on too hard    Accompanying  "Signs & Symptoms:  Signs of infection: it's really red around area    History:   History of trauma: no  Prior warts: YES- when she was a kid    Therapies Tried and outcome: OTC meds    Has a spot on her left calf that she would like removed. Present for a few years. Not changing. Just irritated, gets caught on clothes.     Problem list and histories reviewed & adjusted, as indicated.  Additional history: as documented    Labs reviewed in EPIC    Reviewed and updated as needed this visit by clinical staff  Tobacco  Allergies  Meds  Med Hx  Surg Hx  Fam Hx  Soc Hx      Reviewed and updated as needed this visit by Provider         ROS:  Constitutional, HEENT, cardiovascular, pulmonary, gi and gu systems are negative, except as otherwise noted.    OBJECTIVE:     /80 (Cuff Size: Adult Regular)   Pulse 72   Temp 98.2  F (36.8  C) (Oral)   Ht 1.626 m (5' 4\")   Wt 78.5 kg (173 lb)   BMI 29.70 kg/m     Body mass index is 29.7 kg/m .  GENERAL: healthy, alert and no distress  HENT: ear canals and TM's normal, nose and mouth without ulcers or lesions  NECK: no adenopathy, no asymmetry, masses, or scars and thyroid normal to palpation  RESP: lungs clear to auscultation - no rales, rhonchi or wheezes  CV: regular rate and rhythm, normal S1 S2, no S3 or S4, no murmur, click or rub, no peripheral edema and peripheral pulses strong  SKIN: Left medial calf there is an oily keratotic papule, 6 mm diameter, stuck on appearance. The right hand pointer finger shows a typical viral wart along the lateral edge - 5-6 mm     ASSESSMENT/PLAN:     (E03.9) Hypothyroidism, unspecified type  (primary encounter diagnosis)  Comment:   Plan: TSH WITH FREE T4 REFLEX, T4, free, T3, Free        Reviewed - recheck labs. Refills given.    (R11.0) Nausea  Comment:   Plan: omeprazole (PRILOSEC) 40 MG DR capsule        Chronic. GERD symptoms. No red flags. Will refill. This prescription is given with a discussion of side effects, risks and " proper use.  Instructions are given to follow up if not improving or symptoms change or worsen as discussed.     (Z12.31) Visit for screening mammogram  Comment:   Plan: MA SCREENING DIGITAL BILAT - Future  (s+30)        Reminder     (R05) Cough  Comment:   Plan: guaiFENesin-codeine (ROBITUSSIN AC) 100-10         MG/5ML solution        Lungs clear today. Likely viral. Symptoms are improving. Symptomatic measures and suggestions for self care given.  Follow up if not improving or symptoms change as discussed.  All questions were answered.     (J06.9) Viral URI  Comment:   Plan: guaiFENesin-codeine (ROBITUSSIN AC) 100-10         MG/5ML solution              (L82.0) Inflamed seborrheic keratosis  Comment:   Plan: Reviewed options. Requests cryotherapy. This lesion was freeze/thaw x 2 deep cycles. Follow up if needed.    (B07.8) Common wart  Comment:   Plan: Reviewed options. Requests cryotherapy. Lesion was freeze/thaw x 2 deep cycles           ADELAIDE ULLOA PA-C  [facility]

## 2019-03-30 LAB
T4 FREE SERPL-MCNC: 0.72 NG/DL (ref 0.76–1.46)
TSH SERPL DL<=0.005 MIU/L-ACNC: 0.05 MU/L (ref 0.4–4)

## 2019-03-30 ASSESSMENT — ASTHMA QUESTIONNAIRES: ACT_TOTALSCORE: 17

## 2019-05-09 ENCOUNTER — TRANSFERRED RECORDS (OUTPATIENT)
Dept: HEALTH INFORMATION MANAGEMENT | Facility: CLINIC | Age: 49
End: 2019-05-09

## 2019-06-05 ENCOUNTER — MYC REFILL (OUTPATIENT)
Dept: FAMILY MEDICINE | Facility: CLINIC | Age: 49
End: 2019-06-05

## 2019-06-05 DIAGNOSIS — J45.20 INTERMITTENT ASTHMA, UNCOMPLICATED: ICD-10-CM

## 2019-06-06 NOTE — TELEPHONE ENCOUNTER
"Requested Prescriptions   Pending Prescriptions Disp Refills     albuterol (PROAIR HFA/PROVENTIL HFA/VENTOLIN HFA) 108 (90 Base) MCG/ACT inhaler  Last Written Prescription Date:  2/6/2019  Last Fill Quantity: 1 inhaler,  # refills: 1   Last office visit: 3/29/2019 with prescribing provider:  RADHA Charles   Future Office Visit:           Sig: Inhale 2 puffs into the lungs every 6 hours as needed for shortness of breath / dyspnea or wheezing       Asthma Maintenance Inhalers - Anticholinergics Failed - 6/6/2019  7:44 AM        Failed - Asthma control assessment score within normal limits in last 6 months     Please review ACT score.   ACT Total Scores 3/28/2017 11/13/2017 3/29/2019   ACT TOTAL SCORE - - -   ASTHMA ER VISITS - - -   ASTHMA HOSPITALIZATIONS - - -   ACT TOTAL SCORE (Goal Greater than or Equal to 20) 18 23 17   In the past 12 months, how many times did you visit the emergency room for your asthma without being admitted to the hospital? 0 0 0   In the past 12 months, how many times were you hospitalized overnight because of your asthma? 0 0 0               Passed - Patient is age 12 years or older        Passed - Medication is active on med list        Passed - Recent (6 mo) or future (30 days) visit within the authorizing provider's specialty     Patient had office visit in the last 6 months or has a visit in the next 30 days with authorizing provider or within the authorizing provider's specialty.  See \"Patient Info\" tab in inbasket, or \"Choose Columns\" in Meds & Orders section of the refill encounter.              "

## 2019-06-07 RX ORDER — ALBUTEROL SULFATE 90 UG/1
2 AEROSOL, METERED RESPIRATORY (INHALATION) EVERY 6 HOURS PRN
Qty: 18 G | Refills: 1 | Status: SHIPPED | OUTPATIENT
Start: 2019-06-07 | End: 2019-11-05

## 2019-07-04 ENCOUNTER — TELEPHONE (OUTPATIENT)
Dept: FAMILY MEDICINE | Facility: CLINIC | Age: 49
End: 2019-07-04

## 2019-07-04 DIAGNOSIS — E03.9 HYPOTHYROIDISM, UNSPECIFIED TYPE: ICD-10-CM

## 2019-07-05 NOTE — TELEPHONE ENCOUNTER
"Requested Prescriptions   Pending Prescriptions Disp Refills     levothyroxine (SYNTHROID/LEVOTHROID) 200 MCG tablet [Pharmacy Med Name: LEVOTHYROXINE SOD 0.2MG(200MCG) TAB]  Last Written Prescription Date:  3/28/2019  Last Fill Quantity: 90 tablet,  # refills: 0   Last office visit: 3/29/2019 with prescribing provider:  RADHA Charles   Future Office Visit:     90 tablet 0     Sig: TAKE 1 TABLET(200 MCG) BY MOUTH DAILY       Thyroid Protocol Failed - 7/4/2019 11:36 AM        Failed - Normal TSH on file in past 12 months     Recent Labs   Lab Test 03/29/19  1449   TSH 0.05*              Passed - Patient is 12 years or older        Passed - Recent (12 mo) or future (30 days) visit within the authorizing provider's specialty     Patient had office visit in the last 12 months or has a visit in the next 30 days with authorizing provider or within the authorizing provider's specialty.  See \"Patient Info\" tab in inbasket, or \"Choose Columns\" in Meds & Orders section of the refill encounter.              Passed - Medication is active on med list        Passed - No active pregnancy on record     If patient is pregnant or has had a positive pregnancy test, please check TSH.          Passed - No positive pregnancy test in past 12 months     If patient is pregnant or has had a positive pregnancy test, please check TSH.            "

## 2019-07-09 RX ORDER — LEVOTHYROXINE SODIUM 200 UG/1
200 TABLET ORAL DAILY
Qty: 90 TABLET | Refills: 0 | Status: SHIPPED | OUTPATIENT
Start: 2019-07-09 | End: 2019-11-05

## 2019-07-09 NOTE — TELEPHONE ENCOUNTER
Patient/family was instructed to return call to [facility] RN directly on the RN Call back line at [phone].     Nory Duran RN

## 2019-07-09 NOTE — TELEPHONE ENCOUNTER
Spoke with patient to assist with scheduling f/u thyroid labs. Patient is not very agreeable, is wondering if she really needs to have labs checked. She states every time she requests a refill, lab is recommended, but then talking with PCP the decision is usually made to forego, as anytime she has a recent med change, labs are off. She states it is expensive for her every time she has labs done. She states she is going to send a [employer] message to PCP as well.    Nory Duran RN

## 2019-07-09 NOTE — TELEPHONE ENCOUNTER
Routing refill request to provider for review/approval because:  Labs out of range:  TSH  Elenita Christopher, RN

## 2019-07-09 NOTE — TELEPHONE ENCOUNTER
Refill sent. Please let patient know to schedule a lab only to recheck as the labs were off in March.  Thanks.  WILLIAM Rahman, PA-C

## 2019-07-09 NOTE — TELEPHONE ENCOUNTER
I understand the frustration. This time - all 3 labs were abnormal and didn't fit a pattern that makes sense which is why I do want to retest if possible.    Thanks.  Gurjit

## 2019-07-09 NOTE — TELEPHONE ENCOUNTER
Patient/family was instructed to return call to [facility] RN directly on the RN Call back line at [phone].     Isaura Curiel RN

## 2019-07-11 ENCOUNTER — TELEPHONE (OUTPATIENT)
Dept: FAMILY MEDICINE | Facility: CLINIC | Age: 49
End: 2019-07-11

## 2019-07-11 ENCOUNTER — MYC MEDICAL ADVICE (OUTPATIENT)
Dept: FAMILY MEDICINE | Facility: CLINIC | Age: 49
End: 2019-07-11

## 2019-07-11 NOTE — TELEPHONE ENCOUNTER
Panel Management Review      Patient has the following on her problem list:     Asthma review     ACT Total Scores 3/29/2019   ACT TOTAL SCORE -   ASTHMA ER VISITS -   ASTHMA HOSPITALIZATIONS -   ACT TOTAL SCORE (Goal Greater than or Equal to 20) 17   In the past 12 months, how many times did you visit the emergency room for your asthma without being admitted to the hospital? 0   In the past 12 months, how many times were you hospitalized overnight because of your asthma? 0      1. Is Asthma diagnosis on the Problem List? Yes    2. Is Asthma listed on Health Maintenance? Yes    3. Patient is due for:  AAP and recheck of ACT, last one was scored at 17    Composite cancer screening  Chart review shows that this patient is due/due soon for the following None  Summary:    Patient is due/failing the following:   AAP, ACT and PHYSICAL    Action needed:   Patient needs office visit for Physical. and Patient needs to do ACT.    Type of outreach:    Sent [employer] message. and Sent letter.    Questions for provider review:    None                                                                                                                                    Maegan Sage MA       Chart routed to Care Team .

## 2019-07-11 NOTE — LETTER
[address] [address]  [phone]                                                                                                July 11, 2019    Carol Ann [place]iano  [address]  [place] [address]        Dear Ms. Anguiano,    At [facility] we care about your health and well-being. A review of your chart has indicated that you are due for a(n) complete physical exam, fasting labs and Asthma recheck. Please contact us at [phone] to schedule your appointment.   If you have already had one or all of the above screening tests at another facility, please call us to update your chart.     We have included an Asthma Control Test with this letter. Please complete it and send it back in the provided envelope. Thank you.     Sincerely,      Your Care Team

## 2019-07-11 NOTE — TELEPHONE ENCOUNTER
Routing [employer] message to PCP to please advise.     See TE from 7/4/19. Patient is still resistant to a lab only appointment unless absolutely needed.     Isaura Curiel RN

## 2019-07-18 NOTE — TELEPHONE ENCOUNTER
Called patient and patient said she will mail back ACT when she can. Encounter closed.     Robyn Castro MA

## 2019-09-17 ENCOUNTER — OFFICE VISIT (OUTPATIENT)
Dept: FAMILY MEDICINE | Facility: CLINIC | Age: 49
End: 2019-09-17
Payer: COMMERCIAL

## 2019-09-17 VITALS
SYSTOLIC BLOOD PRESSURE: 118 MMHG | DIASTOLIC BLOOD PRESSURE: 62 MMHG | OXYGEN SATURATION: 98 % | RESPIRATION RATE: 16 BRPM | HEART RATE: 67 BPM | BODY MASS INDEX: 29.02 KG/M2 | WEIGHT: 170 LBS | HEIGHT: 64 IN | TEMPERATURE: 98.7 F

## 2019-09-17 DIAGNOSIS — J45.20 MILD INTERMITTENT ASTHMA WITHOUT COMPLICATION: ICD-10-CM

## 2019-09-17 DIAGNOSIS — R07.0 THROAT PAIN: Primary | ICD-10-CM

## 2019-09-17 DIAGNOSIS — J01.90 ACUTE SINUSITIS, RECURRENCE NOT SPECIFIED, UNSPECIFIED LOCATION: ICD-10-CM

## 2019-09-17 LAB
DEPRECATED S PYO AG THROAT QL EIA: NORMAL
SPECIMEN SOURCE: NORMAL

## 2019-09-17 PROCEDURE — 99214 OFFICE O/P EST MOD 30 MIN: CPT | Performed by: FAMILY MEDICINE

## 2019-09-17 PROCEDURE — 87081 CULTURE SCREEN ONLY: CPT | Performed by: FAMILY MEDICINE

## 2019-09-17 PROCEDURE — 87880 STREP A ASSAY W/OPTIC: CPT | Performed by: FAMILY MEDICINE

## 2019-09-17 ASSESSMENT — MIFFLIN-ST. JEOR: SCORE: 1391.11

## 2019-09-17 NOTE — LETTER
My Asthma Action Plan    Name: Carol Ann Anguiano   YOB: 1970  Date: 9/17/2019   My doctor: Bryanna Marshall MD   My clinic: [facility]        My Rescue Medicine:   Albuterol inhaler (Proair/Ventolin/Proventil HFA)  2-4 puffs EVERY 4 HOURS as needed. Use a spacer if recommended by your provider.   My Asthma Severity:   Intermittent / Exercise Induced  Know your asthma triggers: upper respiratory infections and Dogs             GREEN ZONE   Good Control    I feel good    No cough or wheeze    Can work, sleep and play without asthma symptoms       Take your asthma control medicine every day.     1. If exercise triggers your asthma, take your rescue medication    15 minutes before exercise or sports, and    During exercise if you have asthma symptoms  2. Spacer to use with inhaler: If you have a spacer, make sure to use it with your inhaler             YELLOW ZONE Getting Worse  I have ANY of these:    I do not feel good    Cough or wheeze    Chest feels tight    Wake up at night   1. Keep taking your Green Zone medications  2. Start taking your rescue medicine:    every 20 minutes for up to 1 hour. Then every 4 hours for 24-48 hours.  3. If you stay in the Yellow Zone for more than 12-24 hours, contact your doctor.  4. If you do not return to the Green Zone in 12-24 hours or you get worse, start taking your oral steroid medicine if prescribed by your provider.           RED ZONE Medical Alert - Get Help  I have ANY of these:    I feel awful    Medicine is not helping    Breathing getting harder    Trouble walking or talking    Nose opens wide to breathe       1. Take your rescue medicine NOW  2. If your provider has prescribed an oral steroid medicine, start taking it NOW  3. Call your doctor NOW  4. If you are still in the Red Zone after 20 minutes and you have not reached your doctor:    Take your rescue medicine again and    Call 911 or go to the emergency room right away    See your  regular doctor within 2 weeks of an Emergency Room or Urgent Care visit for follow-up treatment.          Annual Reminders:  Meet with Asthma Educator,  Flu Shot in the Fall, consider Pneumonia Vaccination for patients with asthma (aged 19 and older).    Pharmacy:    [id number] [place] [place] - [address] [id number] - [place] - [address] AT [facility] [address]                        Asthma Triggers  How To Control Things That Make Your Asthma Worse    Triggers are things that make your asthma worse.  Look at the list below to help you find your triggers and   what you can do about them. You can help prevent asthma flare-ups by staying away from your triggers.      Trigger                                                          What you can do   Cigarette Smoke  Tobacco smoke can make asthma worse. Do not allow smoking in your home, car or around you.  Be sure no one smokes at a child s day care or school.  If you smoke, ask your health care provider for ways to help you quit.  Ask family members to quit too.  Ask your health care provider for a referral to Quit Plan to help you quit smoking, or call 7-323-239-PLAN.     Colds, Flu, Bronchitis  These are common triggers of asthma. Wash your hands often.  Don t touch your eyes, nose or mouth.  Get a flu shot every year.     Dust Mites  These are tiny bugs that live in cloth or carpet. They are too small to see. Wash sheets and blankets in hot water every week.   Encase pillows and mattress in dust mite proof covers.  Avoid having carpet if you can. If you have carpet, vacuum weekly.   Use a dust mask and HEPA vacuum.   Pollen and Outdoor Mold  Some people are allergic to trees, grass, or weed pollen, or molds. Try to keep your windows closed.  Limit time out doors when pollen count is high.   Ask you health care provider about taking  medicine during allergy season.     Animal Dander  Some people are allergic to skin flakes, urine or saliva from pets with fur or feathers. Keep pets with fur or feathers out of your home.    If you can t keep the pet outdoors, then keep the pet out of your bedroom.  Keep the bedroom door closed.  Keep pets off cloth furniture and away from stuffed toys.     Mice, Rats, and Cockroaches  Some people are allergic to the waste from these pests.   Cover food and garbage.  Clean up spills and food crumbs.  Store grease in the refrigerator.   Keep food out of the bedroom.   Indoor Mold  This can be a trigger if your home has high moisture. Fix leaking faucets, pipes, or other sources of water.   Clean moldy surfaces.  Dehumidify basement if it is damp and smelly.   Smoke, Strong Odors, and Sprays  These can reduce air quality. Stay away from strong odors and sprays, such as perfume, powder, hair spray, paints, smoke incense, paint, cleaning products, candles and new carpet.   Exercise or Sports  Some people with asthma have this trigger. Be active!  Ask your doctor about taking medicine before sports or exercise to prevent symptoms.    Warm up for 5-10 minutes before and after sports or exercise.     Other Triggers of Asthma  Cold air:  Cover your nose and mouth with a scarf.  Sometimes laughing or crying can be a trigger.  Some medicines and food can trigger asthma.

## 2019-09-18 LAB
BACTERIA SPEC CULT: NORMAL
SPECIMEN SOURCE: NORMAL

## 2019-09-18 ASSESSMENT — ASTHMA QUESTIONNAIRES: ACT_TOTALSCORE: 21

## 2019-09-25 ENCOUNTER — TELEPHONE (OUTPATIENT)
Dept: FAMILY MEDICINE | Facility: CLINIC | Age: 49
End: 2019-09-25

## 2019-09-25 NOTE — TELEPHONE ENCOUNTER
So i am at my wits ends with this itching on my scalp, its painful and getting worse, it is effecting my everyday life at this point and my appointment is not until a week from Friday.  If you think you will recommend a dermatologist can we just skip this appointment and can you provide a referral now?  I am worried that ifwait another week to see you then get the referral just to wait some more to see another dr. i will go crazy.  If you still want to see me, then i may cancel my appointment and try to get in to see someone else at the clinic, waiting for over another week will not be possible for me.      Thank you and i appreciate your response   Carol Ann     Copied this message from a [employer] appointment request.    Maritza Samuels

## 2019-09-25 NOTE — TELEPHONE ENCOUNTER
Patient/family was instructed to return call to [facility] RN directly on the RN Call back line at [phone].     Aldair Patel RN

## 2019-09-26 NOTE — TELEPHONE ENCOUNTER
Patient/family was instructed to return call to [facility] RN directly on the RN Call back line at [phone].     Isaura Curiel RN

## 2019-09-29 ENCOUNTER — HEALTH MAINTENANCE LETTER (OUTPATIENT)
Age: 49
End: 2019-09-29

## 2019-10-03 NOTE — TELEPHONE ENCOUNTER
Closing encounter as Patient has not returned calls, she has an appointment 10/4/19.    Romi Quintanilla RN

## 2019-10-04 ENCOUNTER — OFFICE VISIT (OUTPATIENT)
Dept: FAMILY MEDICINE | Facility: CLINIC | Age: 49
End: 2019-10-04
Payer: COMMERCIAL

## 2019-10-04 VITALS
HEART RATE: 84 BPM | HEIGHT: 64 IN | WEIGHT: 172 LBS | BODY MASS INDEX: 29.37 KG/M2 | DIASTOLIC BLOOD PRESSURE: 85 MMHG | SYSTOLIC BLOOD PRESSURE: 126 MMHG | OXYGEN SATURATION: 97 % | TEMPERATURE: 98.1 F

## 2019-10-04 DIAGNOSIS — L98.9 FINGER LESION: ICD-10-CM

## 2019-10-04 DIAGNOSIS — R21 RASH: ICD-10-CM

## 2019-10-04 DIAGNOSIS — E03.9 HYPOTHYROIDISM, UNSPECIFIED TYPE: Primary | ICD-10-CM

## 2019-10-04 LAB — T3FREE SERPL-MCNC: 2.7 PG/ML (ref 2.3–4.2)

## 2019-10-04 PROCEDURE — 84439 ASSAY OF FREE THYROXINE: CPT | Performed by: PHYSICIAN ASSISTANT

## 2019-10-04 PROCEDURE — 84481 FREE ASSAY (FT-3): CPT | Performed by: PHYSICIAN ASSISTANT

## 2019-10-04 PROCEDURE — 99214 OFFICE O/P EST MOD 30 MIN: CPT | Performed by: PHYSICIAN ASSISTANT

## 2019-10-04 PROCEDURE — 36415 COLL VENOUS BLD VENIPUNCTURE: CPT | Performed by: PHYSICIAN ASSISTANT

## 2019-10-04 PROCEDURE — 84443 ASSAY THYROID STIM HORMONE: CPT | Performed by: PHYSICIAN ASSISTANT

## 2019-10-04 RX ORDER — LEVOTHYROXINE SODIUM 200 UG/1
200 TABLET ORAL DAILY
Qty: 90 TABLET | Refills: 0 | Status: CANCELLED | OUTPATIENT
Start: 2019-10-04

## 2019-10-04 RX ORDER — HYDROXYZINE HYDROCHLORIDE 25 MG/1
25-50 TABLET, FILM COATED ORAL EVERY 6 HOURS PRN
Qty: 30 TABLET | Refills: 2 | Status: SHIPPED | OUTPATIENT
Start: 2019-10-04 | End: 2020-07-27

## 2019-10-04 ASSESSMENT — MIFFLIN-ST. JEOR: SCORE: 1395.19

## 2019-10-04 NOTE — PROGRESS NOTES
Subjective     Carol Ann Anguiano is a 49 year old female who presents to clinic today for the following health issues:    HPI   Hypothyroidism Follow-up      Since last visit, patient describes the following symptoms: Weight stable, no hair loss, no skin changes, no constipation, no loose stools    How many servings of fruits and vegetables do you eat daily?  2-3    On average, how many sweetened beverages do you drink each day (soda, juice, sweet tea, etc)?   0  How many days per week do you miss taking your medication? 2    What makes it hard for you to take your medications?  remembering to take    Rash  Onset: 3 weeks    Description:   Location: all over the body   Character: raised bumps, red  Itching (Pruritis): YES    Progression of Symptoms:  worsening    Accompanying Signs & Symptoms:  Fever: no   Body aches or joint pain: no   Sore throat symptoms: YES  Recent cold symptoms: YES    History:   Previous similar rash: no     Precipitating factors:   Exposure to similar rash: no   New exposures: None   Recent travel: no     Alleviating factors:  none    Therapies Tried and outcome: Benadryl, Cortisone     WART(S)? Vs some other?   Onset: a long time    Description:   Location: right index finger   Number of warts: 1  Painful: YES- tender to the touch    Accompanying Signs & Symptoms:  Signs of infection: no    History:   History of trauma: no  Prior warts: YES    Therapies Tried and outcome: liquid nitrogen, OTC meds     Patient Active Problem List   Diagnosis     Mild intermittent asthma     CARDIOVASCULAR SCREENING; LDL GOAL LESS THAN 160     Globus pharyngeus     Tobacco use disorder     Hypothyroidism, unspecified hypothyroidism type     Abnormal uterine bleeding     JONATHON I (cervical intraepithelial neoplasia I)     Nephrolithiasis     Low back pain     Past Surgical History:   Procedure Laterality Date     ABDOMEN SURGERY  04/21/2017    hernia fixed     BIOPSY  05/01/2017    tested area of my cervix that  "was thought to be precancerous     CHOLECYSTECTOMY, LAPOROSCOPIC      Cholecystectomy, Laparoscopic     DILATION AND CURETTAGE, HYSTEROSCOPY DIAGNOSTIC, COMBINED  2017    abnormal uterine bleeding     HERNIA REPAIR  2017     LASIK BILATERAL         Social History     Tobacco Use     Smoking status: Light Tobacco Smoker     Packs/day: 1.00     Years: 25.00     Pack years: 25.00     Types: Cigarettes     Last attempt to quit: 4/3/2018     Years since quittin.5     Smokeless tobacco: Never Used   Substance Use Topics     Alcohol use: Yes     Alcohol/week: 0.0 standard drinks     Comment: occasionally     Family History   Problem Relation Age of Onset     Hypertension Mother      Depression Mother      Hyperlipidemia Mother      Diabetes Father         never met that man     Hypertension Maternal Grandmother      Cancer Paternal Grandfather         lungs/throat     Psychotic Disorder Son         adhd/ocd     Asthma Other      Thyroid Disease Other      Depression Other            Reviewed and updated as needed this visit by Provider         Review of Systems   ROS COMP: Constitutional, HEENT, cardiovascular, pulmonary, gi and gu systems are negative, except as otherwise noted.      Objective    /85   Pulse 84   Temp 98.1  F (36.7  C) (Oral)   Ht 1.626 m (5' 4\")   Wt 78 kg (172 lb)   LMP 2019 (Exact Date)   SpO2 97%   BMI 29.52 kg/m    Body mass index is 29.52 kg/m .  Physical Exam   GENERAL: healthy, alert and no distress  SKIN: Right hand, middle finger there is a 1.5 cm pink flat plaque, well defined.     Back of both knees, in axilla, upper neck there are brown papules, 1 to 3 mm and scattered, multiple - 20-30 lesions.         Assessment & Plan     (E03.9) Hypothyroidism, unspecified type  (primary encounter diagnosis)  Comment:   Plan: TSH, T4, free, T3, Free        Recheck Thyroid. Refills given when labs are back.    (R21) Rash  Comment:   Plan: DERMATOLOGY REFERRAL, " hydrOXYzine (ATARAX) 25         MG tablet        Unclear cause. Recommend derm referral because this is persisting, cause not clear. Rash / itch management.     (L98.9) Finger lesion  Comment:   Plan: Unclear - recommend that she discuss with derm.      WILLIAM Rahman, PA-C

## 2019-10-05 LAB
T4 FREE SERPL-MCNC: 1.46 NG/DL (ref 0.76–1.46)
TSH SERPL DL<=0.005 MIU/L-ACNC: <0.01 MU/L (ref 0.4–4)

## 2019-11-05 ENCOUNTER — MYC REFILL (OUTPATIENT)
Dept: FAMILY MEDICINE | Facility: CLINIC | Age: 49
End: 2019-11-05

## 2019-11-05 DIAGNOSIS — J45.20 INTERMITTENT ASTHMA, UNCOMPLICATED: ICD-10-CM

## 2019-11-05 DIAGNOSIS — E03.9 HYPOTHYROIDISM, UNSPECIFIED TYPE: ICD-10-CM

## 2019-11-06 NOTE — TELEPHONE ENCOUNTER
"Requested Prescriptions   Pending Prescriptions Disp Refills     albuterol (PROAIR HFA/PROVENTIL HFA/VENTOLIN HFA) 108 (90 Base) MCG/ACT inhaler  Last Written Prescription Date:  6/7/2019  Last Fill Quantity: 18 g,  # refills: 1   Last office visit: 10/4/2019 with prescribing provider:  Melvin   Future Office Visit:   18 g 1     Sig: Inhale 2 puffs into the lungs every 6 hours as needed for shortness of breath / dyspnea or wheezing       Asthma Maintenance Inhalers - Anticholinergics Passed - 11/6/2019  7:19 AM        Passed - Patient is age 12 years or older        Passed - Asthma control assessment score within normal limits in last 6 months     Please review ACT score.           Passed - Medication is active on med list        Passed - Recent (6 mo) or future (30 days) visit within the authorizing provider's specialty     Patient had office visit in the last 6 months or has a visit in the next 30 days with authorizing provider or within the authorizing provider's specialty.  See \"Patient Info\" tab in inbasket, or \"Choose Columns\" in Meds & Orders section of the refill encounter.            levothyroxine (SYNTHROID/LEVOTHROID) 200 MCG tablet  Last Written Prescription Date:  7/9/2019  Last Fill Quantity: 90 tabs,  # refills: 0   Last office visit: 10/4/2019 with prescribing provider:  Melvin   Future Office Visit:   90 tablet 0     Sig: Take 1 tablet (200 mcg) by mouth daily       Thyroid Protocol Failed - 11/6/2019  7:19 AM        Failed - Normal TSH on file in past 12 months     Recent Labs   Lab Test 10/04/19  1405   TSH <0.01*              Passed - Patient is 12 years or older        Passed - Recent (12 mo) or future (30 days) visit within the authorizing provider's specialty     Patient has had an office visit with the authorizing provider or a provider within the authorizing providers department within the previous 12 mos or has a future within next 30 days. See \"Patient Info\" tab in inbasket, or \"Choose " "Columns\" in Meds & Orders section of the refill encounter.              Passed - Medication is active on med list        Passed - No active pregnancy on record     If patient is pregnant or has had a positive pregnancy test, please check TSH.          Passed - No positive pregnancy test in past 12 months     If patient is pregnant or has had a positive pregnancy test, please check TSH.           "

## 2019-11-07 RX ORDER — ALBUTEROL SULFATE 90 UG/1
2 AEROSOL, METERED RESPIRATORY (INHALATION) EVERY 6 HOURS PRN
Qty: 18 G | Refills: 1 | Status: SHIPPED | OUTPATIENT
Start: 2019-11-07 | End: 2020-08-16

## 2019-11-07 RX ORDER — LEVOTHYROXINE SODIUM 200 UG/1
200 TABLET ORAL DAILY
Qty: 90 TABLET | Refills: 1 | Status: SHIPPED | OUTPATIENT
Start: 2019-11-07 | End: 2020-08-16

## 2019-11-07 NOTE — TELEPHONE ENCOUNTER
Albuterol: Prescription approved per [facility] Refill Protocol.    Synthroid: Routing refill request to provider for review/approval because:  Labs out of range:  TSH    Shawanda Julien RN, BSN, PHN  [facility]: [place]

## 2019-12-30 ENCOUNTER — THERAPY VISIT (OUTPATIENT)
Dept: CHIROPRACTIC MEDICINE | Facility: CLINIC | Age: 49
End: 2019-12-30
Payer: COMMERCIAL

## 2019-12-30 DIAGNOSIS — M99.02 THORACIC SEGMENT DYSFUNCTION: ICD-10-CM

## 2019-12-30 DIAGNOSIS — M62.838 SPASM OF MUSCLE: ICD-10-CM

## 2019-12-30 DIAGNOSIS — M54.50 LUMBAGO: ICD-10-CM

## 2019-12-30 DIAGNOSIS — M99.03 SEGMENTAL DYSFUNCTION OF LUMBAR REGION: ICD-10-CM

## 2019-12-30 DIAGNOSIS — M99.05 SEGMENTAL DYSFUNCTION OF PELVIC REGION: Primary | ICD-10-CM

## 2019-12-30 PROCEDURE — 98941 CHIROPRACT MANJ 3-4 REGIONS: CPT | Mod: AT | Performed by: CHIROPRACTOR

## 2019-12-30 PROCEDURE — 99203 OFFICE O/P NEW LOW 30 MIN: CPT | Mod: 25 | Performed by: CHIROPRACTOR

## 2019-12-30 NOTE — PROGRESS NOTES
Visit #:  1 in 2019    Subjective:  Carol Ann Anguiano is a 47 year old female who is seen in f/u up for:     1. Segmental dysfunction of pelvic region    2. Lumbago    3. Segmental dysfunction of lumbar region    4. Spasm of muscle    5. Somatic dysfunction of thoracic region      Since last visit on 8/13/2018,  Carol Ann Anguiano reports the following changes: Pain immediately after last treatment: 2/10 and their pain level today 6/10.   Carol Ann reports that she had been doing well but her low back seemed flare up earlier this month after traveling for work and sleeping in a bad hotel bed.  Sine then she has been having a lot of low back pain and stiffness.  there are no radiating symptoms and this is very similar to previous episodes.     Area of chief complaint:  Lumbar and cervical :  Symptoms are graded at 6/10. The quality is described as stiff/sore.  Motion has decreased     Objective:  The following was observed:    P: palpatory tenderness, piriformis R>>L,   Tender:       paraspinal muscles    Non Tender:       remainder of lumbar spine  A: static palpation demonstrates intersegmental asymmetry , , thoracic, lumbar, pelvis    R:ROM:       full flexion       full extension   motion palpation notes restricted motion, : T10, T11, T12, L4, L5, RPSIS    T: hypertonicity at: Piriformis  R>>L      Assessment:    Segmental spinal dysfunction/restrictions found at:   T10, T11, T12, L4, L5, RPSIS    Diagnoses:      1. Segmental dysfunction of pelvic region    2. Lumbago    3. Segmental dysfunction of lumbar region    4. Spasm of muscle    5. Thoracic segment dysfunction                Patient's condition:  Patient had restrictions pre-manipulation    Treatment effectiveness:  Post manipulation there is better intersegmental movement and Patient claims to feel looser post manipulation      Procedures:  Evaluation and Management:  12800 Established patient Moderate level exam 30 min    CMT:  10122 Chiropractic  manipulative treatment 3-4 regions performed   Thoracic: Diversified, T1, T2, T5, T6, T7, Prone  Lumbar: Activator, L4, L5, Prone  Pelvis: Drop Table, PSIS Right , Prone    Modalities:  69095: MSTM:  To Piriformis  for 5 min    Therapeutic procedures:  None      Prognosis: Good    Progress towards Goals: Patient is making progress towards the goal     Response to Treatment:   Reduction in symptoms as reported by patient      Recommendations:    Instructions:ice 20 minutes every other hour as needed    Follow-up:  Return to care in one week

## 2020-01-06 ENCOUNTER — THERAPY VISIT (OUTPATIENT)
Dept: CHIROPRACTIC MEDICINE | Facility: CLINIC | Age: 50
End: 2020-01-06
Payer: COMMERCIAL

## 2020-01-06 DIAGNOSIS — M99.05 SEGMENTAL DYSFUNCTION OF PELVIC REGION: Primary | ICD-10-CM

## 2020-01-06 DIAGNOSIS — M99.03 SEGMENTAL DYSFUNCTION OF LUMBAR REGION: ICD-10-CM

## 2020-01-06 DIAGNOSIS — M99.02 THORACIC SEGMENT DYSFUNCTION: ICD-10-CM

## 2020-01-06 DIAGNOSIS — M54.50 LUMBAGO: ICD-10-CM

## 2020-01-06 DIAGNOSIS — M62.838 SPASM OF MUSCLE: ICD-10-CM

## 2020-01-06 PROCEDURE — 98941 CHIROPRACT MANJ 3-4 REGIONS: CPT | Mod: AT | Performed by: CHIROPRACTOR

## 2020-01-06 NOTE — PROGRESS NOTES
Visit #:  2 (1 in 2020)    Subjective:  Carol Ann Anguiano is a 49 year old female who is seen in f/u up for:        Segmental dysfunction of pelvic region  Lumbago  Segmental dysfunction of lumbar region  Spasm of muscle  Thoracic segment dysfunction.     Since last visit on 12/30/2019,  Carol Ann Anguiano reports:    Area of chief complaint:  Thoracic and Lumbar :  Symptoms are graded at 7/10. The quality is described as stiff, achey.  Motion has remained about the same, no improvement. Patient feels that they were feeling better for a few days and then the pain/stiffness returned..     Objective:  The following was observed:    P: palpatory tenderness Lumbar erector spine, Piriformis and T-spine paraspinal   A: static palpation demonstrates intersegmental asymmetry , thoracic, lumbar, pelvis  R: motion palpation notes restricted motion, T5 , T6 , T7 , L4 , L5  and PSIS Right   T: hypertonicity at: Lumbar erector spine, Piriformis and T-spine paraspinal     Segmental spinal dysfunction/restrictions found at:  T4 , T5 , T6 , L4 , L5  and PSIS Right       Assessment:    Diagnoses:      1. Segmental dysfunction of pelvic region    2. Lumbago    3. Segmental dysfunction of lumbar region    4. Spasm of muscle    5. Thoracic segment dysfunction        Patient's condition:  Patient had restrictions pre-manipulation    Treatment effectiveness:  Post manipulation there is better intersegmental movement and Patient claims to feel looser post manipulation      Procedures:  CMT:  87035 Chiropractic manipulative treatment 3-4 regions performed   Thoracic: Activator, T4, T5, T6, Prone  Lumbar: Activator, L4, L5, Prone  Pelvis: Drop Table, PSIS Right , Prone    Modalities:  59918: MSTM:  To Lumbar erector spine, Piriformis and T-spine paraspinal  for 5 min    Therapeutic procedures:  None    Response to Treatment  Reduction in symptoms as reported by patient    Prognosis: Good    Progress towards Goals: Patient is making  progress towards the goal.     Recommendations:    Instructions:  ice 20 minutes every other hour as needed and stretch as instructed at visit    Follow-up:    Return to care in one week.

## 2020-02-26 ENCOUNTER — OFFICE VISIT (OUTPATIENT)
Dept: OBGYN | Facility: CLINIC | Age: 50
End: 2020-02-26
Payer: COMMERCIAL

## 2020-02-26 DIAGNOSIS — N90.7 LABIAL CYST: Primary | ICD-10-CM

## 2020-02-26 PROCEDURE — 99212 OFFICE O/P EST SF 10 MIN: CPT | Performed by: OBSTETRICS & GYNECOLOGY

## 2020-02-26 NOTE — PROGRESS NOTES
GYN Problem Visit                                                 CC:  Vaginal cyst    HPI:  Carol Ann Anguiano is a 49 year old female who presents to clinic today complaining of a vaginal cyst that she noted on Saturday (4 days ago).  It's located on the left labia minora and was pea sized at the largest.  Noticed a small amount of blood from that spot due to irritation; she attributed this to her prolonged period and using feminine hygeine products for extended period of time.  Otherwise, hasn't noticed any discharge from the area.  Was tender initially, but that has improved since size has decreased.    No hx of STI    ROS:  C: NEGATIVE for fever, chills  GI: NEGATIVE for nausea, abdominal pain, heartburn, or change in bowel habits  : NEGATIVE for frequency, dysuria, hematuria, vaginal discharge.  Has hx of irregular periods, has been evaluated by another OB/GYN  N: NEGATIVE for weakness, dizziness or paresthesias    EXAM:  not currently breastfeeding.   BMI= There is no height or weight on file to calculate BMI.  General - pleasant female in no acute distress.  Pelvic - external genitalia: normal adult female without lesions or abnormalities with the exception of a 2mm inclusion cyst in left labia minora.  No drainage could be expressed from this lesion.  No surrounding induration or erythema.  Labia otherwise WNL.    Rectovaginal - deferred.  Musculoskeletal - no gross deformities.  Neurological - normal strength, sensation, and mental status.      Assessment:    Carol Ann Anguiano is a 49 year old  who presents today to discuss vaginal cyst.    Plan:  1. Labial cyst  Exam findings and inclusion cyst.  No indication for aspiration or I&D at this point given very small size and the fact it is already started to improve.  Anticipate that this will continue.  Recommended warm compresses.     She return to office if things do not continue to get better over the next week.      Jayde Velazquez MD

## 2020-07-24 ENCOUNTER — MYC MEDICAL ADVICE (OUTPATIENT)
Dept: FAMILY MEDICINE | Facility: CLINIC | Age: 50
End: 2020-07-24

## 2020-07-27 ENCOUNTER — VIRTUAL VISIT (OUTPATIENT)
Dept: FAMILY MEDICINE | Facility: CLINIC | Age: 50
End: 2020-07-27
Payer: COMMERCIAL

## 2020-07-27 DIAGNOSIS — R30.0 DYSURIA: Primary | ICD-10-CM

## 2020-07-27 PROCEDURE — 99212 OFFICE O/P EST SF 10 MIN: CPT | Mod: 95 | Performed by: PHYSICIAN ASSISTANT

## 2020-07-27 RX ORDER — SODIUM SULFACETAMIDE AND SULFUR 80; 40 MG/ML; MG/ML
SOLUTION TOPICAL
COMMUNITY
Start: 2020-06-17 | End: 2023-11-08

## 2020-07-27 RX ORDER — FLURANDRENOLIDE 0.5 MG/ML
LOTION TOPICAL
COMMUNITY
Start: 2020-06-17 | End: 2022-04-07

## 2020-07-27 RX ORDER — AMMONIUM LACTATE 12 G/100G
CREAM TOPICAL
COMMUNITY
Start: 2020-06-17 | End: 2022-04-07

## 2020-08-16 ENCOUNTER — MYC REFILL (OUTPATIENT)
Dept: FAMILY MEDICINE | Facility: CLINIC | Age: 50
End: 2020-08-16

## 2020-08-16 DIAGNOSIS — E03.9 HYPOTHYROIDISM, UNSPECIFIED TYPE: ICD-10-CM

## 2020-08-16 DIAGNOSIS — J45.20 INTERMITTENT ASTHMA, UNCOMPLICATED: ICD-10-CM

## 2020-08-18 RX ORDER — ALBUTEROL SULFATE 90 UG/1
2 AEROSOL, METERED RESPIRATORY (INHALATION) EVERY 6 HOURS PRN
Qty: 18 G | Refills: 2 | Status: SHIPPED | OUTPATIENT
Start: 2020-08-18 | End: 2020-10-31

## 2020-08-18 RX ORDER — LEVOTHYROXINE SODIUM 200 UG/1
200 TABLET ORAL DAILY
Qty: 90 TABLET | Refills: 1 | Status: SHIPPED | OUTPATIENT
Start: 2020-08-18 | End: 2021-04-06

## 2020-08-18 NOTE — TELEPHONE ENCOUNTER
"Requested Prescriptions   Pending Prescriptions Disp Refills     albuterol (PROAIR HFA/PROVENTIL HFA/VENTOLIN HFA) 108 (90 Base) MCG/ACT inhaler 18 g 1     Sig: Inhale 2 puffs into the lungs every 6 hours as needed for shortness of breath / dyspnea or wheezing       Asthma Maintenance Inhalers - Anticholinergics Failed - 8/17/2020  7:30 AM        Failed - Asthma control assessment score within normal limits in last 6 months     Please review ACT score.           Passed - Patient is age 12 years or older        Passed - Medication is active on med list        Passed - Recent (6 mo) or future (30 days) visit within the authorizing provider's specialty     Patient had office visit in the last 6 months or has a visit in the next 30 days with authorizing provider or within the authorizing provider's specialty.  See \"Patient Info\" tab in inbasket, or \"Choose Columns\" in Meds & Orders section of the refill encounter.           Short-Acting Beta Agonist Inhalers Protocol  Failed - 8/17/2020  7:30 AM        Failed - Asthma control assessment score within normal limits in last 6 months     Please review ACT score.           Passed - Patient is age 12 or older        Passed - Medication is active on med list        Passed - Recent (6 mo) or future (30 days) visit within the authorizing provider's specialty     Patient had office visit in the last 6 months or has a visit in the next 30 days with authorizing provider or within the authorizing provider's specialty.  See \"Patient Info\" tab in inbasket, or \"Choose Columns\" in Meds & Orders section of the refill encounter.               levothyroxine (SYNTHROID/LEVOTHROID) 200 MCG tablet 90 tablet 1     Sig: Take 1 tablet (200 mcg) by mouth daily       Thyroid Protocol Failed - 8/17/2020  7:30 AM        Failed - Normal TSH on file in past 12 months     Recent Labs   Lab Test 10/04/19  1405   TSH <0.01*              Passed - Patient is 12 years or older        Passed - Recent (12 mo) " "or future (30 days) visit within the authorizing provider's specialty     Patient has had an office visit with the authorizing provider or a provider within the authorizing providers department within the previous 12 mos or has a future within next 30 days. See \"Patient Info\" tab in inbasket, or \"Choose Columns\" in Meds & Orders section of the refill encounter.              Passed - Medication is active on med list        Passed - No active pregnancy on record     If patient is pregnant or has had a positive pregnancy test, please check TSH.          Passed - No positive pregnancy test in past 12 months     If patient is pregnant or has had a positive pregnancy test, please check TSH.               "

## 2020-08-18 NOTE — TELEPHONE ENCOUNTER
Routing refill request to provider for review/approval because:  Labs out of range:  TSH  Labs not current:  ACT not updated with past 6 months. Overdue for ACT.    ACT Total Scores 11/13/2017 3/29/2019 9/17/2019   ACT TOTAL SCORE - - -   ASTHMA ER VISITS - - -   ASTHMA HOSPITALIZATIONS - - -   ACT TOTAL SCORE (Goal Greater than or Equal to 20) 23 17 21   In the past 12 months, how many times did you visit the emergency room for your asthma without being admitted to the hospital? 0 0 0   In the past 12 months, how many times were you hospitalized overnight because of your asthma? 0 0 0      Failed - Normal TSH on file in past 12 months            Recent Labs   Lab Test 10/04/19  1405   TSH <0.01*

## 2020-10-31 ENCOUNTER — MYC REFILL (OUTPATIENT)
Dept: FAMILY MEDICINE | Facility: CLINIC | Age: 50
End: 2020-10-31

## 2020-10-31 ENCOUNTER — MYC REFILL (OUTPATIENT)
Dept: OTOLARYNGOLOGY | Facility: CLINIC | Age: 50
End: 2020-10-31

## 2020-10-31 DIAGNOSIS — H60.8X3 CHRONIC ECZEMATOUS OTITIS EXTERNA OF BOTH EARS: ICD-10-CM

## 2020-10-31 DIAGNOSIS — J45.20 INTERMITTENT ASTHMA, UNCOMPLICATED: ICD-10-CM

## 2020-11-02 RX ORDER — ALBUTEROL SULFATE 90 UG/1
2 AEROSOL, METERED RESPIRATORY (INHALATION) EVERY 6 HOURS PRN
Qty: 18 G | Refills: 2 | Status: SHIPPED | OUTPATIENT
Start: 2020-11-02 | End: 2021-08-18

## 2020-11-02 RX ORDER — FLUOCINOLONE ACETONIDE 0.11 MG/ML
OIL AURICULAR (OTIC)
Qty: 20 ML | Refills: 1 | Status: SHIPPED | OUTPATIENT
Start: 2020-11-02 | End: 2022-04-07

## 2020-11-02 NOTE — TELEPHONE ENCOUNTER
Routing refill request to provider for review/approval because:  Failed: ACT      Chey Castro RN

## 2020-11-02 NOTE — TELEPHONE ENCOUNTER
Fluocinolone is not on [facility] refill protocol. Fwd to md. Annel Wyatt, RN Specialty Triage 11/2/2020 3:37 PM

## 2020-11-17 ENCOUNTER — VIRTUAL VISIT (OUTPATIENT)
Dept: FAMILY MEDICINE | Facility: CLINIC | Age: 50
End: 2020-11-17
Payer: COMMERCIAL

## 2020-11-17 DIAGNOSIS — R61 NIGHT SWEATS: Primary | ICD-10-CM

## 2020-11-17 DIAGNOSIS — L29.9 ITCHING: ICD-10-CM

## 2020-11-17 DIAGNOSIS — R21 RASH: ICD-10-CM

## 2020-11-17 DIAGNOSIS — R63.4 WEIGHT LOSS: ICD-10-CM

## 2020-11-17 PROCEDURE — 99214 OFFICE O/P EST MOD 30 MIN: CPT | Mod: 95 | Performed by: PHYSICIAN ASSISTANT

## 2020-11-17 RX ORDER — CLOTRIMAZOLE AND BETAMETHASONE DIPROPIONATE 10; .64 MG/G; MG/G
CREAM TOPICAL 2 TIMES DAILY
Qty: 45 G | Refills: 2 | Status: SHIPPED | OUTPATIENT
Start: 2020-11-17 | End: 2022-04-07

## 2020-11-17 NOTE — PROGRESS NOTES
"Carol Ann Anguiano is a 50 year old female who is being evaluated via a billable video visit.      The patient has been notified of following:     \"This video visit will be conducted via a call between you and your physician/provider. We have found that certain health care needs can be provided without the need for an in-person physical exam.  This service lets us provide the care you need with a video conversation.  If a prescription is necessary we can send it directly to your pharmacy.  If lab work is needed we can place an order for that and you can then stop by our lab to have the test done at a later time.    Video visits are billed at different rates depending on your insurance coverage.  Please reach out to your insurance provider with any questions.    If during the course of the call the physician/provider feels a video visit is not appropriate, you will not be charged for this service.\"    Patient has given verbal consent for Video visit? Yes  How would you like to obtain your AVS? MyChart  If you are dropped from the video visit, the video invite should be resent to: Send to e-mail at: [email or web address]  Will anyone else be joining your video visit? No    Subjective     Carol Ann Anguiano is a 50 year old female who presents today via video visit for the following health issues:    HPI     Night sweats and hot flashes, for about 1 month now.   Felling miserable.         Video Start Time: 1100      Review of Systems   Constitutional, HEENT, cardiovascular, pulmonary, gi and gu systems are negative, except as otherwise noted.      Objective           Vitals:  No vitals were obtained today due to virtual visit.    Physical Exam     GENERAL: Healthy, alert and no distress  EYES: Eyes grossly normal to inspection.  No discharge or erythema, or obvious scleral/conjunctival abnormalities.  RESP: No audible wheeze, cough, or visible cyanosis.  No visible retractions or increased work of breathing.    SKIN: " Visible skin clear. No significant rash, abnormal pigmentation or lesions.  NEURO: Cranial nerves grossly intact.  Mentation and speech appropriate for age.  PSYCH: Mentation appears normal, affect normal/bright, judgement and insight intact, normal speech and appearance well-groomed.            Assessment & Plan     Night sweats  For a month. Weight loss. Check labs and recommend a face to face in 2 weeks to review and recheck.   - Comprehensive metabolic panel (BMP + Alb, Alk Phos, ALT, AST, Total. Bili, TP); Future  - TSH; Future  - CBC with platelets and differential; Future  - ESR: Erythrocyte sedimentation rate; Future  - Follicle stimulating hormone; Future  - Lutropin; Future    Weight loss  As noted  - Comprehensive metabolic panel (BMP + Alb, Alk Phos, ALT, AST, Total. Bili, TP); Future  - TSH; Future  - CBC with platelets and differential; Future  - ESR: Erythrocyte sedimentation rate; Future  - Follicle stimulating hormone; Future  - Lutropin; Future    Itching  Check labs. Has left over hydroxyzine she will employ.   - Comprehensive metabolic panel (BMP + Alb, Alk Phos, ALT, AST, Total. Bili, TP); Future  - TSH; Future  - CBC with platelets and differential; Future  - ESR: Erythrocyte sedimentation rate; Future  - Follicle stimulating hormone; Future  - Lutropin; Future    Rash  Ear canals and gluteal cleft. Trial topical. This prescription is given with a discussion of side effects, risks and proper use.  Instructions are given to follow up if not improving or symptoms change or worsen as discussed.   - clotrimazole-betamethasone (LOTRISONE) 1-0.05 % external cream; Apply topically 2 times daily        No follow-ups on file.    ADELAIDE ULLOA PA-C  [facility]      Video-Visit Details    Type of service:  Video Visit    Video End Time:1120    Originating Location (pt. Location): Home    Distant Location (provider location):  [facility]     Platform  used for Video Visit: [employer]

## 2020-11-23 ENCOUNTER — MYC MEDICAL ADVICE (OUTPATIENT)
Dept: FAMILY MEDICINE | Facility: CLINIC | Age: 50
End: 2020-11-23

## 2020-11-25 DIAGNOSIS — L29.9 ITCHING: ICD-10-CM

## 2020-11-25 DIAGNOSIS — R63.4 WEIGHT LOSS: ICD-10-CM

## 2020-11-25 DIAGNOSIS — R61 NIGHT SWEATS: ICD-10-CM

## 2020-11-25 LAB
BASOPHILS # BLD AUTO: 0 10E9/L (ref 0–0.2)
BASOPHILS NFR BLD AUTO: 0.4 %
DIFFERENTIAL METHOD BLD: ABNORMAL
EOSINOPHIL # BLD AUTO: 0.4 10E9/L (ref 0–0.7)
EOSINOPHIL NFR BLD AUTO: 3.2 %
ERYTHROCYTE [DISTWIDTH] IN BLOOD BY AUTOMATED COUNT: 13.5 % (ref 10–15)
ERYTHROCYTE [SEDIMENTATION RATE] IN BLOOD BY WESTERGREN METHOD: 5 MM/H (ref 0–20)
HCT VFR BLD AUTO: 46.4 % (ref 35–47)
HGB BLD-MCNC: 15.9 G/DL (ref 11.7–15.7)
LYMPHOCYTES # BLD AUTO: 3.7 10E9/L (ref 0.8–5.3)
LYMPHOCYTES NFR BLD AUTO: 33.5 %
MCH RBC QN AUTO: 30.6 PG (ref 26.5–33)
MCHC RBC AUTO-ENTMCNC: 34.3 G/DL (ref 31.5–36.5)
MCV RBC AUTO: 89 FL (ref 78–100)
MONOCYTES # BLD AUTO: 0.8 10E9/L (ref 0–1.3)
MONOCYTES NFR BLD AUTO: 6.8 %
NEUTROPHILS # BLD AUTO: 6.2 10E9/L (ref 1.6–8.3)
NEUTROPHILS NFR BLD AUTO: 56.1 %
PLATELET # BLD AUTO: 352 10E9/L (ref 150–450)
RBC # BLD AUTO: 5.2 10E12/L (ref 3.8–5.2)
WBC # BLD AUTO: 11.1 10E9/L (ref 4–11)

## 2020-11-25 PROCEDURE — 36415 COLL VENOUS BLD VENIPUNCTURE: CPT | Performed by: PHYSICIAN ASSISTANT

## 2020-11-25 PROCEDURE — 80050 GENERAL HEALTH PANEL: CPT | Performed by: PHYSICIAN ASSISTANT

## 2020-11-25 PROCEDURE — 83001 ASSAY OF GONADOTROPIN (FSH): CPT | Performed by: PHYSICIAN ASSISTANT

## 2020-11-25 PROCEDURE — 85652 RBC SED RATE AUTOMATED: CPT | Performed by: PHYSICIAN ASSISTANT

## 2020-11-25 PROCEDURE — 83002 ASSAY OF GONADOTROPIN (LH): CPT | Performed by: PHYSICIAN ASSISTANT

## 2020-11-27 LAB
ALBUMIN SERPL-MCNC: 4.2 G/DL (ref 3.4–5)
ALP SERPL-CCNC: 51 U/L (ref 40–150)
ALT SERPL W P-5'-P-CCNC: 21 U/L (ref 0–50)
ANION GAP SERPL CALCULATED.3IONS-SCNC: 9 MMOL/L (ref 3–14)
AST SERPL W P-5'-P-CCNC: 15 U/L (ref 0–45)
BILIRUB SERPL-MCNC: 0.4 MG/DL (ref 0.2–1.3)
BUN SERPL-MCNC: 14 MG/DL (ref 7–30)
CALCIUM SERPL-MCNC: 9.6 MG/DL (ref 8.5–10.1)
CHLORIDE SERPL-SCNC: 103 MMOL/L (ref 94–109)
CO2 SERPL-SCNC: 28 MMOL/L (ref 20–32)
CREAT SERPL-MCNC: 0.89 MG/DL (ref 0.52–1.04)
FSH SERPL-ACNC: 105 IU/L
GFR SERPL CREATININE-BSD FRML MDRD: 76 ML/MIN/{1.73_M2}
GLUCOSE SERPL-MCNC: 82 MG/DL (ref 70–99)
LH SERPL-ACNC: 50.4 IU/L
POTASSIUM SERPL-SCNC: 3.9 MMOL/L (ref 3.4–5.3)
PROT SERPL-MCNC: 7.3 G/DL (ref 6.8–8.8)
SODIUM SERPL-SCNC: 140 MMOL/L (ref 133–144)
TSH SERPL DL<=0.005 MIU/L-ACNC: 0.01 MU/L (ref 0.4–4)

## 2020-12-02 ENCOUNTER — THERAPY VISIT (OUTPATIENT)
Dept: CHIROPRACTIC MEDICINE | Facility: CLINIC | Age: 50
End: 2020-12-02
Payer: COMMERCIAL

## 2020-12-02 DIAGNOSIS — M99.03 SEGMENTAL DYSFUNCTION OF LUMBAR REGION: ICD-10-CM

## 2020-12-02 DIAGNOSIS — M62.838 SPASM OF MUSCLE: ICD-10-CM

## 2020-12-02 DIAGNOSIS — G89.29 CHRONIC BILATERAL LOW BACK PAIN WITHOUT SCIATICA: ICD-10-CM

## 2020-12-02 DIAGNOSIS — M99.05 SEGMENTAL DYSFUNCTION OF PELVIC REGION: Primary | ICD-10-CM

## 2020-12-02 DIAGNOSIS — M99.02 THORACIC SEGMENT DYSFUNCTION: ICD-10-CM

## 2020-12-02 DIAGNOSIS — M99.01 CERVICAL SEGMENT DYSFUNCTION: ICD-10-CM

## 2020-12-02 DIAGNOSIS — M54.2 CERVICALGIA: ICD-10-CM

## 2020-12-02 DIAGNOSIS — M54.50 CHRONIC BILATERAL LOW BACK PAIN WITHOUT SCIATICA: ICD-10-CM

## 2020-12-02 PROCEDURE — 98941 CHIROPRACT MANJ 3-4 REGIONS: CPT | Mod: AT | Performed by: CHIROPRACTOR

## 2020-12-02 NOTE — PROGRESS NOTES
Visit #:  2 in 2020    Subjective:  Carol Ann Anguiano is a 49 year old female who is seen in f/u up for:        Segmental dysfunction of pelvic region  Chronic bilateral low back pain without sciatica  Segmental dysfunction of lumbar region  Spasm of muscle  Thoracic segment dysfunction  Cervicalgia  Cervical segment dysfunction.     Since last visit on 1/6/2020,  Carol Ann Anguiano reports:    Area of chief complaint:  Cervical, Thoracic and Lumbar :  Symptoms are graded at 9/10. The quality is described as stiff, achey.  Motion has reduced in the past month or so.  Carol Ann reports that she was doing better after last visit and then started having some pain.  She was not sure if the clinic was open and then waited to be seen.  Her back pain would come and go, bu this last flare up started about a month ago, insidiously and is still present.      Objective:  The following was observed:    P: palpatory tenderness Lumbar erector spine, Piriformis and T-spine paraspinal upper traps  A: static palpation demonstrates intersegmental asymmetry , Cervical,  thoracic, lumbar, pelvis  R: motion palpation notes restricted motion,C6, C7, T1, T2,  T5 , T6 , T7 , L4 , L5  and PSIS Right   T: hypertonicity at: Lumbar erector spine, Piriformis and T-spine paraspinal upper traps    Segmental spinal dysfunction/restrictions found at:   C6, C7, T1, T2, T4 , T5 , T6 , L4 , L5  and PSIS Right       Assessment:    Diagnoses:      1. Segmental dysfunction of pelvic region    2. Chronic bilateral low back pain without sciatica    3. Segmental dysfunction of lumbar region    4. Spasm of muscle    5. Thoracic segment dysfunction    6. Cervicalgia    7. Cervical segment dysfunction        Patient's condition:  Patient had restrictions pre-manipulation    Treatment effectiveness:  Post manipulation there is better intersegmental movement and Patient claims to feel looser post manipulation      Procedures:  CMT:  00039 Chiropractic manipulative  treatment 3-4 regions performed   Cervical: Activator, C6, C7 ,prone  Thoracic: Activator,t1, T2,  T4, T5, T6, Prone  Lumbar: Activator, L4, L5, Prone  Pelvis: Drop Table, PSIS Right , Prone    Modalities:  86018: MSTM:  To Lumbar erector spine, Piriformis and T-spine paraspinal  for 5 min    Therapeutic procedures:  None    Response to Treatment  Reduction in symptoms as reported by patient    Prognosis: Good    Progress towards Goals: Patient is making progress towards the goal.     Recommendations:    Instructions:  ice 20 minutes every other hour as needed and stretch as instructed at visit    Follow-up:    Return to care in one week.

## 2020-12-04 ENCOUNTER — ANCILLARY PROCEDURE (OUTPATIENT)
Dept: GENERAL RADIOLOGY | Facility: CLINIC | Age: 50
End: 2020-12-04
Attending: PHYSICIAN ASSISTANT
Payer: COMMERCIAL

## 2020-12-04 ENCOUNTER — OFFICE VISIT (OUTPATIENT)
Dept: FAMILY MEDICINE | Facility: CLINIC | Age: 50
End: 2020-12-04
Payer: COMMERCIAL

## 2020-12-04 VITALS
DIASTOLIC BLOOD PRESSURE: 76 MMHG | TEMPERATURE: 98.5 F | BODY MASS INDEX: 26.98 KG/M2 | SYSTOLIC BLOOD PRESSURE: 136 MMHG | WEIGHT: 157.2 LBS | HEART RATE: 86 BPM | OXYGEN SATURATION: 98 %

## 2020-12-04 DIAGNOSIS — R61 NIGHT SWEATS: ICD-10-CM

## 2020-12-04 DIAGNOSIS — M54.50 BILATERAL LOW BACK PAIN WITHOUT SCIATICA, UNSPECIFIED CHRONICITY: ICD-10-CM

## 2020-12-04 DIAGNOSIS — E03.9 HYPOTHYROIDISM, UNSPECIFIED TYPE: ICD-10-CM

## 2020-12-04 DIAGNOSIS — D72.829 LEUKOCYTOSIS, UNSPECIFIED TYPE: Primary | ICD-10-CM

## 2020-12-04 PROCEDURE — 71046 X-RAY EXAM CHEST 2 VIEWS: CPT | Mod: FY | Performed by: RADIOLOGY

## 2020-12-04 PROCEDURE — 99214 OFFICE O/P EST MOD 30 MIN: CPT | Performed by: PHYSICIAN ASSISTANT

## 2020-12-04 PROCEDURE — 72100 X-RAY EXAM L-S SPINE 2/3 VWS: CPT | Mod: FY | Performed by: RADIOLOGY

## 2020-12-04 NOTE — PROGRESS NOTES
Subjective     Carol Ann Anguiano is a 50 year old female who presents to clinic today for the following health issues:    HPI          Patient would like to go over recent lab results from 11/25/2020.     Carol Ann presents with night sweats x3 months and hot flashes x1 month. She had a virtual visit on 11/17 for same reason with plan to check labs and do follow up in person. She reports symptoms have not changed since virtual appointment. She started taking black cohosh for past 6 days with no improvement in symptoms yet. LMP was Sept 2020. Reports periods every few months for past year. No recent changes to thyroid medication. Also reports steroid cream for itchy ears has greatly improved her itchiness.    Patient Active Problem List   Diagnosis     Mild intermittent asthma     CARDIOVASCULAR SCREENING; LDL GOAL LESS THAN 160     Globus pharyngeus     Tobacco use disorder     Hypothyroidism, unspecified hypothyroidism type     Abnormal uterine bleeding     JONATHON I (cervical intraepithelial neoplasia I)     Nephrolithiasis     Low back pain      Current Outpatient Medications   Medication     albuterol (PROAIR HFA/PROVENTIL HFA/VENTOLIN HFA) 108 (90 Base) MCG/ACT inhaler     ammonium lactate (AMLACTIN) 12 % external cream     BLACK COHOSH PO     clotrimazole-betamethasone (LOTRISONE) 1-0.05 % external cream     fluocinolone acetonide 0.01 % OIL     flurandrenolide (CORDRAN) 0.05 % external lotion     levothyroxine (SYNTHROID/LEVOTHROID) 200 MCG tablet     metroNIDAZOLE (METROCREAM) 0.75 % external cream     omeprazole (PRILOSEC) 40 MG DR capsule     Sulfacetamide Sodium-Sulfur 8-4 % SUSP     No current facility-administered medications for this visit.         Review of Systems   Constitutional, HEENT, cardiovascular, pulmonary, GI, , musculoskeletal, neuro, skin, endocrine and psych systems are negative, except as otherwise noted.      Objective    /76 (BP Location: Right arm, Patient Position: Chair, Cuff Size:  Adult Large)   Pulse 86   Temp 98.5  F (36.9  C) (Tympanic)   Wt 71.3 kg (157 lb 3.2 oz)   LMP 09/30/2020 (Exact Date)   SpO2 98%   Breastfeeding No   BMI 26.98 kg/m    Body mass index is 26.98 kg/m .  Physical Exam     GENERAL: alert, no acute distress.  NECK: no palpable lymph nodes. Thyroid not palpable.  LUNGS: clear to auscultation. No wheezes or crackles.  HEART: normal rate and rhythm. No murmurs heard.       Assessment:  Night sweats  - Labs suggestive of perimenopausal state.  - Continue black cohosh.    Weight loss  - Obtain chest XR because of weight loss, night sweats, and smoking history though I think the weight is related to planned weight loss. X ray appears to be normal, no concerns.   - Repeat CBC and thyroid labs in 6 weeks.    Low back pain - getting an x ray per Chiropractor request.     No follow-ups on file.    ADELAIDE ULLOA PA-C  [facility]

## 2020-12-05 ASSESSMENT — ASTHMA QUESTIONNAIRES: ACT_TOTALSCORE: 24

## 2020-12-09 ENCOUNTER — THERAPY VISIT (OUTPATIENT)
Dept: CHIROPRACTIC MEDICINE | Facility: CLINIC | Age: 50
End: 2020-12-09
Payer: COMMERCIAL

## 2020-12-09 DIAGNOSIS — M54.50 CHRONIC BILATERAL LOW BACK PAIN WITHOUT SCIATICA: ICD-10-CM

## 2020-12-09 DIAGNOSIS — M54.2 CERVICALGIA: ICD-10-CM

## 2020-12-09 DIAGNOSIS — M99.05 SEGMENTAL DYSFUNCTION OF PELVIC REGION: Primary | ICD-10-CM

## 2020-12-09 DIAGNOSIS — M62.838 SPASM OF MUSCLE: ICD-10-CM

## 2020-12-09 DIAGNOSIS — M99.03 SEGMENTAL DYSFUNCTION OF LUMBAR REGION: ICD-10-CM

## 2020-12-09 DIAGNOSIS — M99.01 CERVICAL SEGMENT DYSFUNCTION: ICD-10-CM

## 2020-12-09 DIAGNOSIS — G89.29 CHRONIC BILATERAL LOW BACK PAIN WITHOUT SCIATICA: ICD-10-CM

## 2020-12-09 DIAGNOSIS — M99.02 THORACIC SEGMENT DYSFUNCTION: ICD-10-CM

## 2020-12-09 PROCEDURE — 98941 CHIROPRACT MANJ 3-4 REGIONS: CPT | Mod: AT | Performed by: CHIROPRACTOR

## 2020-12-09 NOTE — PROGRESS NOTES
Visit #:  3 in 2020    Subjective:  Carol Ann Anguiano is a 49 year old female who is seen in f/u up for:        Segmental dysfunction of pelvic region  Chronic bilateral low back pain without sciatica  Segmental dysfunction of lumbar region  Spasm of muscle  Thoracic segment dysfunction  Cervicalgia  Cervical segment dysfunction.     Since last visit on 12/2/2020,  Carol Ann Anguiano reports:    Area of chief complaint:  Cervical, Thoracic and Lumbar :  Symptoms are graded at 8/10. The quality is described as stiff, achey.  Motion has been about the same.  Carol Ann reports that she is still having a lot of pain in her back.  She has been sore since last visit and today is the first day that she is beginning to feel better.    Objective:  The following was observed:    P: palpatory tenderness Lumbar erector spine, Piriformis and T-spine paraspinal upper traps  A: static palpation demonstrates intersegmental asymmetry , Cervical,  thoracic, lumbar, pelvis  R: motion palpation notes restricted motion,C6, C7, T1, T2,  T5 , T6 , T7 , L4 , L5  and PSIS Right   T: hypertonicity at: Lumbar erector spine, Piriformis and T-spine paraspinal upper traps    Segmental spinal dysfunction/restrictions found at:   C6, C7, T1, T2, T4 , T5 , T6 , L4 , L5  and PSIS Right       Assessment:    Diagnoses:      1. Segmental dysfunction of pelvic region    2. Chronic bilateral low back pain without sciatica    3. Segmental dysfunction of lumbar region    4. Spasm of muscle    5. Thoracic segment dysfunction    6. Cervicalgia    7. Cervical segment dysfunction        Patient's condition:  Patient had restrictions pre-manipulation    Treatment effectiveness:  Post manipulation there is better intersegmental movement and Patient claims to feel looser post manipulation      Procedures:  CMT:  63868 Chiropractic manipulative treatment 3-4 regions performed   Cervical: diversified, C6, C7 ,prone  Thoracic: Activator/diversified,t1, T2,  T4, T5, T6,  Prone  Lumbar: Activator, diversified, L4, L5, Prone side posture  Pelvis: Drop Table, PSIS Right , Prone    Modalities:  99140: MSTM:  To Lumbar erector spine, Piriformis and T-spine paraspinal  for 5 min    Therapeutic procedures:  None    Response to Treatment  Reduction in symptoms as reported by patient    Prognosis: Good    Progress towards Goals: Patient is making progress towards the goal.     Recommendations:    Instructions:  ice 20 minutes every other hour as needed and stretch as instructed at visit    Follow-up:    Return to care in one week.

## 2020-12-17 ENCOUNTER — THERAPY VISIT (OUTPATIENT)
Dept: CHIROPRACTIC MEDICINE | Facility: CLINIC | Age: 50
End: 2020-12-17
Payer: COMMERCIAL

## 2020-12-17 DIAGNOSIS — M99.01 CERVICAL SEGMENT DYSFUNCTION: ICD-10-CM

## 2020-12-17 DIAGNOSIS — M99.05 SEGMENTAL DYSFUNCTION OF PELVIC REGION: Primary | ICD-10-CM

## 2020-12-17 DIAGNOSIS — M62.838 SPASM OF MUSCLE: ICD-10-CM

## 2020-12-17 DIAGNOSIS — M54.2 CERVICALGIA: ICD-10-CM

## 2020-12-17 DIAGNOSIS — M54.50 BILATERAL LOW BACK PAIN WITHOUT SCIATICA, UNSPECIFIED CHRONICITY: ICD-10-CM

## 2020-12-17 DIAGNOSIS — M99.02 THORACIC SEGMENT DYSFUNCTION: ICD-10-CM

## 2020-12-17 DIAGNOSIS — M99.03 SEGMENTAL DYSFUNCTION OF LUMBAR REGION: ICD-10-CM

## 2020-12-17 PROCEDURE — 98941 CHIROPRACT MANJ 3-4 REGIONS: CPT | Mod: AT | Performed by: CHIROPRACTOR

## 2020-12-17 NOTE — PROGRESS NOTES
Visit #:  4 in 2020    Subjective:  Carol Ann Anguiano is a 49 year old female who is seen in f/u up for:        Segmental dysfunction of pelvic region  Bilateral low back pain without sciatica, unspecified chronicity  Segmental dysfunction of lumbar region  Spasm of muscle  Thoracic segment dysfunction  Cervicalgia  Cervical segment dysfunction.     Since last visit on 12/2/2020,  Carol Ann Anguiano reports:    Area of chief complaint:  Cervical, Thoracic and Lumbar :  Symptoms are graded at 4/10. The quality is described as stiff, achey.  Motion has improved but not normal.  Carol Ann reports that she is feeling better.  She is having less pain in her back and is feel improved.  She is able to do more without pain.    Objective:  The following was observed:    P: palpatory tenderness Lumbar erector spine, Piriformis and T-spine paraspinal upper traps  A: static palpation demonstrates intersegmental asymmetry , Cervical,  thoracic, lumbar, pelvis  R: motion palpation notes restricted motion,C6, C7, T1, T2,  T5 , T6 , T7 , L4 , L5  and PSIS Right   T: hypertonicity at: Lumbar erector spine, Piriformis and T-spine paraspinal upper traps    Segmental spinal dysfunction/restrictions found at:   C6, C7, T1, T2, T4 , T5 , T6 , L4 , L5  and PSIS Right       Assessment:    Diagnoses:      1. Segmental dysfunction of pelvic region    2. Bilateral low back pain without sciatica, unspecified chronicity    3. Segmental dysfunction of lumbar region    4. Spasm of muscle    5. Thoracic segment dysfunction    6. Cervicalgia    7. Cervical segment dysfunction        Patient's condition:  Patient had restrictions pre-manipulation    Treatment effectiveness:  Post manipulation there is better intersegmental movement and Patient claims to feel looser post manipulation      Procedures:  CMT:  48294 Chiropractic manipulative treatment 3-4 regions performed   Cervical: diversified, C6, C7 ,prone  Thoracic: diversified,t1, T2,  T4, T5, T6,  Prone  Lumbar: diversified, L4, L5, Prone side posture  Pelvis: Drop Table, PSIS Right , Prone    Modalities:  11693: MSTM:  To Lumbar erector spine, Piriformis and T-spine paraspinal  for 5 min    Therapeutic procedures:  None    Response to Treatment  Reduction in symptoms as reported by patient    Prognosis: Good    Progress towards Goals: Patient is making progress towards the goal.     Recommendations:    Instructions:  ice 20 minutes every other hour as needed and stretch as instructed at visit    Follow-up:    Return to care in one week.

## 2021-01-10 ENCOUNTER — HEALTH MAINTENANCE LETTER (OUTPATIENT)
Age: 51
End: 2021-01-10

## 2021-01-19 DIAGNOSIS — E03.9 HYPOTHYROIDISM, UNSPECIFIED TYPE: ICD-10-CM

## 2021-01-19 DIAGNOSIS — D72.829 LEUKOCYTOSIS, UNSPECIFIED TYPE: ICD-10-CM

## 2021-01-19 LAB
BASOPHILS # BLD AUTO: 0 10E9/L (ref 0–0.2)
BASOPHILS NFR BLD AUTO: 0.4 %
DIFFERENTIAL METHOD BLD: ABNORMAL
EOSINOPHIL # BLD AUTO: 0.6 10E9/L (ref 0–0.7)
EOSINOPHIL NFR BLD AUTO: 6.5 %
ERYTHROCYTE [DISTWIDTH] IN BLOOD BY AUTOMATED COUNT: 13.9 % (ref 10–15)
HCT VFR BLD AUTO: 47.9 % (ref 35–47)
HGB BLD-MCNC: 16.2 G/DL (ref 11.7–15.7)
LYMPHOCYTES # BLD AUTO: 3.2 10E9/L (ref 0.8–5.3)
LYMPHOCYTES NFR BLD AUTO: 37.4 %
MCH RBC QN AUTO: 30.8 PG (ref 26.5–33)
MCHC RBC AUTO-ENTMCNC: 33.8 G/DL (ref 31.5–36.5)
MCV RBC AUTO: 91 FL (ref 78–100)
MONOCYTES # BLD AUTO: 0.5 10E9/L (ref 0–1.3)
MONOCYTES NFR BLD AUTO: 5.6 %
NEUTROPHILS # BLD AUTO: 4.3 10E9/L (ref 1.6–8.3)
NEUTROPHILS NFR BLD AUTO: 50.1 %
PLATELET # BLD AUTO: 309 10E9/L (ref 150–450)
RBC # BLD AUTO: 5.26 10E12/L (ref 3.8–5.2)
WBC # BLD AUTO: 8.5 10E9/L (ref 4–11)

## 2021-01-19 PROCEDURE — 84439 ASSAY OF FREE THYROXINE: CPT | Performed by: PHYSICIAN ASSISTANT

## 2021-01-19 PROCEDURE — 36415 COLL VENOUS BLD VENIPUNCTURE: CPT | Performed by: PHYSICIAN ASSISTANT

## 2021-01-19 PROCEDURE — 84443 ASSAY THYROID STIM HORMONE: CPT | Performed by: PHYSICIAN ASSISTANT

## 2021-01-19 PROCEDURE — 85025 COMPLETE CBC W/AUTO DIFF WBC: CPT | Performed by: PHYSICIAN ASSISTANT

## 2021-01-19 PROCEDURE — 84481 FREE ASSAY (FT-3): CPT | Performed by: PHYSICIAN ASSISTANT

## 2021-01-20 LAB
T3FREE SERPL-MCNC: 2.7 PG/ML (ref 2.3–4.2)
T4 FREE SERPL-MCNC: 1.27 NG/DL (ref 0.76–1.46)
TSH SERPL DL<=0.005 MIU/L-ACNC: 0.02 MU/L (ref 0.4–4)

## 2021-01-21 ENCOUNTER — MYC MEDICAL ADVICE (OUTPATIENT)
Dept: FAMILY MEDICINE | Facility: CLINIC | Age: 51
End: 2021-01-21

## 2021-01-21 DIAGNOSIS — R79.89 ABNORMAL CBC: Primary | ICD-10-CM

## 2021-01-21 NOTE — TELEPHONE ENCOUNTER
[employer] message sent    Will keep open x 1 day in case of further questions.      Romi Quintanilla RN

## 2021-01-22 NOTE — TELEPHONE ENCOUNTER
Routing [employer] message to provider    Patient wanting PCP input regarding difference in platelet counts.    Message was sent by PCP on 12-7-2020 indicating normal chest X-ray and was reviewed by patient    Cristopher Agudelo RN, BSN, PHN  [facility]

## 2021-01-29 ENCOUNTER — MYC MEDICAL ADVICE (OUTPATIENT)
Dept: FAMILY MEDICINE | Facility: CLINIC | Age: 51
End: 2021-01-29

## 2021-01-29 NOTE — TELEPHONE ENCOUNTER
RN replied to patient via [employer]. See message for details.     Cristopher Agudelo RN, BSN, PHN  [facility]: [place]

## 2021-02-05 DIAGNOSIS — R79.89 ABNORMAL CBC: ICD-10-CM

## 2021-02-05 LAB
BASOPHILS # BLD AUTO: 0 10E9/L (ref 0–0.2)
BASOPHILS NFR BLD AUTO: 0.4 %
DIFFERENTIAL METHOD BLD: ABNORMAL
EOSINOPHIL # BLD AUTO: 0.4 10E9/L (ref 0–0.7)
EOSINOPHIL NFR BLD AUTO: 4.8 %
ERYTHROCYTE [DISTWIDTH] IN BLOOD BY AUTOMATED COUNT: 13.6 % (ref 10–15)
FERRITIN SERPL-MCNC: 66 NG/ML (ref 8–252)
FOLATE SERPL-MCNC: 15.8 NG/ML
HCT VFR BLD AUTO: 48.7 % (ref 35–47)
HGB BLD-MCNC: 16.6 G/DL (ref 11.7–15.7)
IRON SATN MFR SERPL: 11 % (ref 15–46)
IRON SERPL-MCNC: 45 UG/DL (ref 35–180)
LYMPHOCYTES # BLD AUTO: 3.3 10E9/L (ref 0.8–5.3)
LYMPHOCYTES NFR BLD AUTO: 41.1 %
MCH RBC QN AUTO: 30.1 PG (ref 26.5–33)
MCHC RBC AUTO-ENTMCNC: 34.1 G/DL (ref 31.5–36.5)
MCV RBC AUTO: 88 FL (ref 78–100)
MONOCYTES # BLD AUTO: 0.6 10E9/L (ref 0–1.3)
MONOCYTES NFR BLD AUTO: 7 %
NEUTROPHILS # BLD AUTO: 3.8 10E9/L (ref 1.6–8.3)
NEUTROPHILS NFR BLD AUTO: 46.7 %
PLATELET # BLD AUTO: 316 10E9/L (ref 150–450)
RBC # BLD AUTO: 5.51 10E12/L (ref 3.8–5.2)
RETICS # AUTO: 50.3 10E9/L (ref 25–95)
RETICS/RBC NFR AUTO: 0.9 % (ref 0.5–2)
TIBC SERPL-MCNC: 412 UG/DL (ref 240–430)
VIT B12 SERPL-MCNC: 278 PG/ML (ref 193–986)
WBC # BLD AUTO: 8.1 10E9/L (ref 4–11)

## 2021-02-05 PROCEDURE — 83550 IRON BINDING TEST: CPT | Performed by: PHYSICIAN ASSISTANT

## 2021-02-05 PROCEDURE — 36415 COLL VENOUS BLD VENIPUNCTURE: CPT | Performed by: PHYSICIAN ASSISTANT

## 2021-02-05 PROCEDURE — 82746 ASSAY OF FOLIC ACID SERUM: CPT | Performed by: PHYSICIAN ASSISTANT

## 2021-02-05 PROCEDURE — 82607 VITAMIN B-12: CPT | Performed by: PHYSICIAN ASSISTANT

## 2021-02-05 PROCEDURE — 85060 BLOOD SMEAR INTERPRETATION: CPT | Performed by: PHYSICIAN ASSISTANT

## 2021-02-05 PROCEDURE — 85045 AUTOMATED RETICULOCYTE COUNT: CPT | Performed by: PHYSICIAN ASSISTANT

## 2021-02-05 PROCEDURE — 85025 COMPLETE CBC W/AUTO DIFF WBC: CPT | Performed by: PHYSICIAN ASSISTANT

## 2021-02-05 PROCEDURE — 82728 ASSAY OF FERRITIN: CPT | Performed by: PHYSICIAN ASSISTANT

## 2021-02-05 PROCEDURE — 83540 ASSAY OF IRON: CPT | Performed by: PHYSICIAN ASSISTANT

## 2021-02-08 LAB — COPATH REPORT: NORMAL

## 2021-02-10 ENCOUNTER — MYC MEDICAL ADVICE (OUTPATIENT)
Dept: FAMILY MEDICINE | Facility: CLINIC | Age: 51
End: 2021-02-10

## 2021-02-10 NOTE — TELEPHONE ENCOUNTER
Duplicate mychart mesg. See mychart encounter from 2/6/21.    Shy Reveles RN  [facility]: [place]  Phone: [phone]  Fax:[phone]

## 2021-04-06 DIAGNOSIS — E03.9 HYPOTHYROIDISM, UNSPECIFIED TYPE: ICD-10-CM

## 2021-04-06 RX ORDER — LEVOTHYROXINE SODIUM 200 UG/1
200 TABLET ORAL DAILY
Qty: 90 TABLET | Refills: 2 | Status: SHIPPED | OUTPATIENT
Start: 2021-04-06 | End: 2022-05-26

## 2021-04-06 NOTE — TELEPHONE ENCOUNTER
Routing refill request to provider for review/approval because:  Labs not current:  TSH      Pending Prescriptions:                       Disp   Refills    levothyroxine (SYNTHROID/LEVOTHROID) 200 M*90 tab*1        Sig: Take 1 tablet (200 mcg) by mouth daily      Romi Quintanilla RN

## 2021-08-10 ENCOUNTER — MYC MEDICAL ADVICE (OUTPATIENT)
Dept: FAMILY MEDICINE | Facility: CLINIC | Age: 51
End: 2021-08-10

## 2021-08-10 DIAGNOSIS — E53.8 VITAMIN B12 DEFICIENCY (NON ANEMIC): Primary | ICD-10-CM

## 2021-08-10 NOTE — TELEPHONE ENCOUNTER
Routing My Chart message to PCP    Patient would like to recheck B12 levels.  Lab pended.    RN saw CBC with platelets pended, but no B12      Cristopher Agudelo RN, BSN, PHN  [facility]

## 2021-08-17 DIAGNOSIS — J45.20 INTERMITTENT ASTHMA, UNCOMPLICATED: ICD-10-CM

## 2021-08-18 RX ORDER — ALBUTEROL SULFATE 90 UG/1
AEROSOL, METERED RESPIRATORY (INHALATION)
Qty: 18 G | Refills: 2 | Status: SHIPPED | OUTPATIENT
Start: 2021-08-18 | End: 2022-02-03

## 2021-08-18 NOTE — TELEPHONE ENCOUNTER
Routing refill request to provider for review/approval because:  Patient needs to be seen because:  6 month or future 30 day visit    ACT Total Scores 3/29/2019 9/17/2019 12/4/2020   ACT TOTAL SCORE - - -   ASTHMA ER VISITS - - -   ASTHMA HOSPITALIZATIONS - - -   ACT TOTAL SCORE (Goal Greater than or Equal to 20) 17 21 24   In the past 12 months, how many times did you visit the emergency room for your asthma without being admitted to the hospital? 0 0 0   In the past 12 months, how many times were you hospitalized overnight because of your asthma? 0 0 0     Sent pt mychart over due for in person annual visit. Has not been seen in clinic in over a year. Last refill until seen    Shy Reveles RN

## 2021-08-23 ENCOUNTER — LAB (OUTPATIENT)
Dept: LAB | Facility: CLINIC | Age: 51
End: 2021-08-23
Payer: COMMERCIAL

## 2021-08-23 DIAGNOSIS — E53.8 VITAMIN B12 DEFICIENCY (NON ANEMIC): ICD-10-CM

## 2021-08-23 DIAGNOSIS — R79.89 ABNORMAL CBC: ICD-10-CM

## 2021-08-23 LAB
BASOPHILS # BLD AUTO: 0 10E3/UL (ref 0–0.2)
BASOPHILS NFR BLD AUTO: 0 %
EOSINOPHIL # BLD AUTO: 0.6 10E3/UL (ref 0–0.7)
EOSINOPHIL NFR BLD AUTO: 7 %
ERYTHROCYTE [DISTWIDTH] IN BLOOD BY AUTOMATED COUNT: 14 % (ref 10–15)
HCT VFR BLD AUTO: 44.9 % (ref 35–47)
HGB BLD-MCNC: 15.5 G/DL (ref 11.7–15.7)
LYMPHOCYTES # BLD AUTO: 3.7 10E3/UL (ref 0.8–5.3)
LYMPHOCYTES NFR BLD AUTO: 37 %
MCH RBC QN AUTO: 30.2 PG (ref 26.5–33)
MCHC RBC AUTO-ENTMCNC: 34.5 G/DL (ref 31.5–36.5)
MCV RBC AUTO: 88 FL (ref 78–100)
MONOCYTES # BLD AUTO: 0.6 10E3/UL (ref 0–1.3)
MONOCYTES NFR BLD AUTO: 7 %
NEUTROPHILS # BLD AUTO: 4.9 10E3/UL (ref 1.6–8.3)
NEUTROPHILS NFR BLD AUTO: 50 %
PLATELET # BLD AUTO: 309 10E3/UL (ref 150–450)
RBC # BLD AUTO: 5.13 10E6/UL (ref 3.8–5.2)
WBC # BLD AUTO: 9.9 10E3/UL (ref 4–11)

## 2021-08-23 PROCEDURE — 82607 VITAMIN B-12: CPT

## 2021-08-23 PROCEDURE — 85025 COMPLETE CBC W/AUTO DIFF WBC: CPT

## 2021-08-23 PROCEDURE — 36415 COLL VENOUS BLD VENIPUNCTURE: CPT

## 2021-08-24 LAB — VIT B12 SERPL-MCNC: 3298 PG/ML (ref 193–986)

## 2021-10-23 ENCOUNTER — HEALTH MAINTENANCE LETTER (OUTPATIENT)
Age: 51
End: 2021-10-23

## 2022-01-03 NOTE — PROGRESS NOTES
Dr. Cuco Foster, patient is schedule for a Medicare Px on 02/23/2022. Patient is requesting blood test order for appointment. Please advise. Subjective     Carol Ann Anguiano is a 48 year old female who presents to clinic today for the following health issues:    HPI   Acute Illness   Acute illness concerns: cough, runny nose, sinus congestion  No Known allergies  Onset: Wednesday, 9/11/19    Fever: YES, 100.0 oral    Chills/Sweats: YES    Headache (location?): YES    Sinus Pressure:YES    Conjunctivitis:  no    Ear Pain: YES: patient states always [place] has ear pain    Rhinorrhea: YES    Congestion: YES    Sore Throat: YES     Cough: YES-non-productive    Wheeze: no     Decreased Appetite: no     Nausea: YES    Vomiting: no     Diarrhea:  no     Dysuria/Freq.: no     Fatigue/Achiness: YES    Sick/Strep Exposure: YES- daughter     Therapies Tried and outcome: vincent ramos    1. Patient will require a note stating she was seen today by provider.  Asthma Follow-Up    Was ACT completed today?    Yes    ACT Total Scores 9/17/2019   ACT TOTAL SCORE -   ASTHMA ER VISITS -   ASTHMA HOSPITALIZATIONS -   ACT TOTAL SCORE (Goal Greater than or Equal to 20) 21   In the past 12 months, how many times did you visit the emergency room for your asthma without being admitted to the hospital? 0   In the past 12 months, how many times were you hospitalized overnight because of your asthma? 0       How many days per week do you miss taking your asthma controller medication?  I do not have an asthma controller medication    Please describe any recent triggers for your asthma: upper respiratory infections and dogs    Have you had any Emergency Room Visits, Urgent Care Visits, or Hospital Admissions since your last office visit?  No            Patient Active Problem List   Diagnosis     Mild intermittent asthma     CARDIOVASCULAR SCREENING; LDL GOAL LESS THAN 160     Globus pharyngeus     Tobacco use disorder     Hypothyroidism, unspecified hypothyroidism type     Abnormal uterine bleeding     JONATHON I (cervical intraepithelial neoplasia I)     Nephrolithiasis     Low back  pain     Past Surgical History:   Procedure Laterality Date     ABDOMEN SURGERY  2017    hernia fixed     BIOPSY  2017    tested area of my cervix that was thought to be precancerous     CHOLECYSTECTOMY, LAPOROSCOPIC      Cholecystectomy, Laparoscopic     DILATION AND CURETTAGE, HYSTEROSCOPY DIAGNOSTIC, COMBINED  2017    abnormal uterine bleeding     HERNIA REPAIR  2017     LASIK BILATERAL         Social History     Tobacco Use     Smoking status: Light Tobacco Smoker     Packs/day: 1.00     Years: 25.00     Pack years: 25.00     Types: Cigarettes     Last attempt to quit: 4/3/2018     Years since quittin.4     Smokeless tobacco: Never Used   Substance Use Topics     Alcohol use: Yes     Alcohol/week: 0.0 oz     Comment: occasionally     Family History   Problem Relation Age of Onset     Hypertension Mother      Depression Mother      Hyperlipidemia Mother      Diabetes Father         never met that man     Hypertension Maternal Grandmother      Cancer Paternal Grandfather         lungs/throat     Psychotic Disorder Son         adhd/ocd     Asthma Other      Thyroid Disease Other      Depression Other          Current Outpatient Medications   Medication Sig Dispense Refill     albuterol (PROAIR HFA/PROVENTIL HFA/VENTOLIN HFA) 108 (90 Base) MCG/ACT inhaler Inhale 2 puffs into the lungs every 6 hours as needed for shortness of breath / dyspnea or wheezing 18 g 1     amoxicillin-clavulanate (AUGMENTIN) 875-125 MG tablet Take 1 tablet by mouth 2 times daily for 10 days 20 tablet 0     levothyroxine (SYNTHROID/LEVOTHROID) 200 MCG tablet Take 1 tablet (200 mcg) by mouth daily 90 tablet 0     omeprazole (PRILOSEC) 40 MG DR capsule Take 1 capsule (40 mg) by mouth daily Take 30-60 minutes before a meal. 90 capsule 3     ANUCORT-HC 25 MG Suppository daily as needed   0     fluocinolone acetonide 0.01 % OIL Use 4-5 drops 1-2 times per day for 5-7 days. (Patient not taking: Reported on 2019)  "20 mL 1     guaiFENesin-codeine (ROBITUSSIN AC) 100-10 MG/5ML solution Take 5-10 mLs by mouth every 4 hours as needed for cough (Patient not taking: Reported on 9/17/2019) 120 mL 0     Allergies   Allergen Reactions     Chantix [Varenicline]      Stomach cramps  Stomach cramps      Eryc [Erythromycin]      dry heaves  nausea     Erythromycin      N/V     Recent Labs   Lab Test 03/29/19  1449 10/06/17  1541  03/03/15  1642  06/20/14  0725  09/18/12  1908   LDL  --   --   --   --   --   --   --  113   HDL  --   --   --   --   --   --   --  65   TRIG  --   --   --   --   --   --   --  79   ALT  --   --   --  21  --   --   --   --    CR  --   --   --   --   --  0.80  --   --    GFRESTIMATED  --   --   --   --   --  79  --   --    GFRESTBLACK  --   --   --   --   --  >90  --   --    POTASSIUM  --   --   --   --   --  4.1  --   --    TSH 0.05* 0.13*   < > 19.75*   < >  --    < >  --     < > = values in this interval not displayed.      BP Readings from Last 3 Encounters:   09/17/19 118/62   03/29/19 114/80   03/13/19 132/84    Wt Readings from Last 3 Encounters:   09/17/19 77.1 kg (170 lb)   03/29/19 78.5 kg (173 lb)   03/13/19 81.6 kg (180 lb)                      Reviewed and updated as needed this visit by Provider  Tobacco  Allergies  Meds  Problems  Med Hx  Surg Hx  Fam Hx         Review of Systems   ROS COMP: CONSTITUTIONAL: NEGATIVE for fever, chills, change in weight  INTEGUMENTARY/SKIN: NEGATIVE for worrisome rashes, moles or lesions  ENT/MOUTH: as above  RESP:as above  CV: NEGATIVE for chest pain, palpitations or peripheral edema  GI: NEGATIVE for nausea, abdominal pain, heartburn, or change in bowel habits  Rest of the ROS is Negative except see above and Problem list [stable]        Objective    /62   Pulse 67   Temp 98.7  F (37.1  C) (Oral)   Resp 16   Ht 1.626 m (5' 4\")   Wt 77.1 kg (170 lb)   LMP 09/14/2019 (Exact Date)   SpO2 98%   Breastfeeding? No   BMI 29.18 kg/m    Body mass index " is 29.18 kg/m .  Physical Exam   GENERAL: healthy, alert and no distress  EYES: Eyes grossly normal to inspection, PERRL and conjunctivae and sclerae normal  HENT: normal cephalic/atraumatic, ear canals and TM's normal, oral mucous membranes moist and throat -mild erythema,nasal congestiom  NECK: no adenopathy, no asymmetry, masses, or scars and thyroid normal to palpation  RESP: lungs clear to auscultation - no rales, rhonchi or wheezes  CV: regular rate and rhythm, normal S1 S2, no S3 or S4, no murmur, click or rub, no peripheral edema and peripheral pulses strong  ABDOMEN: soft, nontender, no hepatosplenomegaly, no masses and bowel sounds normal  MS: no gross musculoskeletal defects noted, no edema    Diagnostic Test Results:  Labs reviewed in Epic  Results for orders placed or performed in visit on 09/17/19 (from the past 24 hour(s))   Strep, Rapid Screen   Result Value Ref Range    Specimen Description Throat     Rapid Strep A Screen       NEGATIVE: No Group A streptococcal antigen detected by immunoassay, await culture report.           Assessment & Plan       ICD-10-CM    1. Throat pain R07.0 Strep, Rapid Screen     Beta strep group A culture   2. Acute sinusitis, recurrence not specified, unspecified location J01.90 amoxicillin-clavulanate (AUGMENTIN) 875-125 MG tablet   3. Mild intermittent asthma without complication J45.20    SEE EPIC care orders  The potential side effects of this medication have been discussed with the patient.  Call if any significant problems with these are experienced.  Use inhalers as recommended Follow up 1 week if not better/sooner if worse       Tobacco Cessation:   reports that she has been smoking cigarettes.  She has a 25.00 pack-year smoking history. She has never used smokeless tobacco.              Return in about 1 month (around 10/17/2019) for Physical Exam.    Bryanna Marshall MD  [facility]

## 2022-02-12 ENCOUNTER — HEALTH MAINTENANCE LETTER (OUTPATIENT)
Age: 52
End: 2022-02-12

## 2022-04-07 ENCOUNTER — OFFICE VISIT (OUTPATIENT)
Dept: FAMILY MEDICINE | Facility: CLINIC | Age: 52
End: 2022-04-07
Payer: COMMERCIAL

## 2022-04-07 VITALS
OXYGEN SATURATION: 98 % | TEMPERATURE: 98.3 F | HEART RATE: 77 BPM | WEIGHT: 172.6 LBS | HEIGHT: 65 IN | SYSTOLIC BLOOD PRESSURE: 120 MMHG | DIASTOLIC BLOOD PRESSURE: 81 MMHG | BODY MASS INDEX: 28.76 KG/M2

## 2022-04-07 DIAGNOSIS — Z00.00 ROUTINE GENERAL MEDICAL EXAMINATION AT A HEALTH CARE FACILITY: Primary | ICD-10-CM

## 2022-04-07 DIAGNOSIS — Z12.31 VISIT FOR SCREENING MAMMOGRAM: ICD-10-CM

## 2022-04-07 DIAGNOSIS — R09.A2 GLOBUS PHARYNGEUS: ICD-10-CM

## 2022-04-07 DIAGNOSIS — F17.200 TOBACCO USE DISORDER: ICD-10-CM

## 2022-04-07 DIAGNOSIS — Z12.11 SCREEN FOR COLON CANCER: ICD-10-CM

## 2022-04-07 DIAGNOSIS — R79.89 ABNORMAL CBC: ICD-10-CM

## 2022-04-07 DIAGNOSIS — R82.90 ABNORMAL URINE ODOR: ICD-10-CM

## 2022-04-07 DIAGNOSIS — E03.9 HYPOTHYROIDISM, UNSPECIFIED TYPE: ICD-10-CM

## 2022-04-07 DIAGNOSIS — Z13.220 SCREENING FOR HYPERLIPIDEMIA: ICD-10-CM

## 2022-04-07 DIAGNOSIS — Z12.4 CERVICAL CANCER SCREENING: ICD-10-CM

## 2022-04-07 LAB
ALBUMIN UR-MCNC: NEGATIVE MG/DL
ANION GAP SERPL CALCULATED.3IONS-SCNC: 5 MMOL/L (ref 3–14)
APPEARANCE UR: CLEAR
BACTERIA #/AREA URNS HPF: ABNORMAL /HPF
BASOPHILS # BLD AUTO: 0 10E3/UL (ref 0–0.2)
BASOPHILS NFR BLD AUTO: 0 %
BILIRUB UR QL STRIP: NEGATIVE
BUN SERPL-MCNC: 13 MG/DL (ref 7–30)
CALCIUM SERPL-MCNC: 9.7 MG/DL (ref 8.5–10.1)
CHLORIDE BLD-SCNC: 108 MMOL/L (ref 94–109)
CHOLEST SERPL-MCNC: 210 MG/DL
CLUE CELLS: ABNORMAL
CO2 SERPL-SCNC: 28 MMOL/L (ref 20–32)
COLOR UR AUTO: YELLOW
CREAT SERPL-MCNC: 0.86 MG/DL (ref 0.52–1.04)
EOSINOPHIL # BLD AUTO: 0.3 10E3/UL (ref 0–0.7)
EOSINOPHIL NFR BLD AUTO: 4 %
ERYTHROCYTE [DISTWIDTH] IN BLOOD BY AUTOMATED COUNT: 14.2 % (ref 10–15)
FASTING STATUS PATIENT QL REPORTED: ABNORMAL
GFR SERPL CREATININE-BSD FRML MDRD: 82 ML/MIN/1.73M2
GLUCOSE BLD-MCNC: 100 MG/DL (ref 70–99)
GLUCOSE UR STRIP-MCNC: NEGATIVE MG/DL
HCT VFR BLD AUTO: 48.3 % (ref 35–47)
HDLC SERPL-MCNC: 65 MG/DL
HGB BLD-MCNC: 16.3 G/DL (ref 11.7–15.7)
HGB UR QL STRIP: ABNORMAL
KETONES UR STRIP-MCNC: NEGATIVE MG/DL
LDLC SERPL CALC-MCNC: 129 MG/DL
LEUKOCYTE ESTERASE UR QL STRIP: NEGATIVE
LYMPHOCYTES # BLD AUTO: 2.1 10E3/UL (ref 0.8–5.3)
LYMPHOCYTES NFR BLD AUTO: 28 %
MCH RBC QN AUTO: 30.3 PG (ref 26.5–33)
MCHC RBC AUTO-ENTMCNC: 33.7 G/DL (ref 31.5–36.5)
MCV RBC AUTO: 90 FL (ref 78–100)
MONOCYTES # BLD AUTO: 0.4 10E3/UL (ref 0–1.3)
MONOCYTES NFR BLD AUTO: 6 %
NEUTROPHILS # BLD AUTO: 4.5 10E3/UL (ref 1.6–8.3)
NEUTROPHILS NFR BLD AUTO: 62 %
NITRATE UR QL: NEGATIVE
NONHDLC SERPL-MCNC: 145 MG/DL
PH UR STRIP: 5 [PH] (ref 5–7)
PLATELET # BLD AUTO: 351 10E3/UL (ref 150–450)
POTASSIUM BLD-SCNC: 4 MMOL/L (ref 3.4–5.3)
RBC # BLD AUTO: 5.38 10E6/UL (ref 3.8–5.2)
RBC #/AREA URNS AUTO: ABNORMAL /HPF
SODIUM SERPL-SCNC: 141 MMOL/L (ref 133–144)
SP GR UR STRIP: >=1.03 (ref 1–1.03)
SQUAMOUS #/AREA URNS AUTO: ABNORMAL /LPF
T4 FREE SERPL-MCNC: 1.43 NG/DL (ref 0.76–1.46)
TRICHOMONAS, WET PREP: ABNORMAL
TRIGL SERPL-MCNC: 79 MG/DL
TSH SERPL DL<=0.005 MIU/L-ACNC: 0.02 MU/L (ref 0.4–4)
UROBILINOGEN UR STRIP-ACNC: 0.2 E.U./DL
VIT B12 SERPL-MCNC: 587 PG/ML (ref 193–986)
WBC # BLD AUTO: 7.4 10E3/UL (ref 4–11)
WBC #/AREA URNS AUTO: ABNORMAL /HPF
WBC'S/HIGH POWER FIELD, WET PREP: ABNORMAL
YEAST, WET PREP: ABNORMAL

## 2022-04-07 PROCEDURE — 99396 PREV VISIT EST AGE 40-64: CPT | Mod: 25 | Performed by: PHYSICIAN ASSISTANT

## 2022-04-07 PROCEDURE — 80048 BASIC METABOLIC PNL TOTAL CA: CPT | Performed by: PHYSICIAN ASSISTANT

## 2022-04-07 PROCEDURE — 87624 HPV HI-RISK TYP POOLED RSLT: CPT | Performed by: PHYSICIAN ASSISTANT

## 2022-04-07 PROCEDURE — 87210 SMEAR WET MOUNT SALINE/INK: CPT | Performed by: PHYSICIAN ASSISTANT

## 2022-04-07 PROCEDURE — G0145 SCR C/V CYTO,THINLAYER,RESCR: HCPCS | Performed by: PHYSICIAN ASSISTANT

## 2022-04-07 PROCEDURE — 36415 COLL VENOUS BLD VENIPUNCTURE: CPT | Performed by: PHYSICIAN ASSISTANT

## 2022-04-07 PROCEDURE — 82607 VITAMIN B-12: CPT | Performed by: PHYSICIAN ASSISTANT

## 2022-04-07 PROCEDURE — 90471 IMMUNIZATION ADMIN: CPT | Performed by: PHYSICIAN ASSISTANT

## 2022-04-07 PROCEDURE — 81001 URINALYSIS AUTO W/SCOPE: CPT | Performed by: PHYSICIAN ASSISTANT

## 2022-04-07 PROCEDURE — 80061 LIPID PANEL: CPT | Performed by: PHYSICIAN ASSISTANT

## 2022-04-07 PROCEDURE — 84439 ASSAY OF FREE THYROXINE: CPT | Performed by: PHYSICIAN ASSISTANT

## 2022-04-07 PROCEDURE — 85025 COMPLETE CBC W/AUTO DIFF WBC: CPT | Performed by: PHYSICIAN ASSISTANT

## 2022-04-07 PROCEDURE — 90750 HZV VACC RECOMBINANT IM: CPT | Performed by: PHYSICIAN ASSISTANT

## 2022-04-07 PROCEDURE — 99213 OFFICE O/P EST LOW 20 MIN: CPT | Mod: 25 | Performed by: PHYSICIAN ASSISTANT

## 2022-04-07 PROCEDURE — 84443 ASSAY THYROID STIM HORMONE: CPT | Performed by: PHYSICIAN ASSISTANT

## 2022-04-07 RX ORDER — OMEPRAZOLE 40 MG/1
40 CAPSULE, DELAYED RELEASE ORAL DAILY
Qty: 90 CAPSULE | Refills: 3 | Status: SHIPPED | OUTPATIENT
Start: 2022-04-07

## 2022-04-07 ASSESSMENT — ENCOUNTER SYMPTOMS
PALPITATIONS: 0
DYSURIA: 0
NERVOUS/ANXIOUS: 0
DIZZINESS: 0
MYALGIAS: 0
FREQUENCY: 0
FEVER: 0
SORE THROAT: 0
SHORTNESS OF BREATH: 0
HEMATOCHEZIA: 0
CHILLS: 0
BREAST MASS: 0
COUGH: 0
HEARTBURN: 1
PARESTHESIAS: 0
ARTHRALGIAS: 1
ABDOMINAL PAIN: 0
NAUSEA: 0
HEADACHES: 0
DIARRHEA: 0
EYE PAIN: 0
HEMATURIA: 0
WEAKNESS: 1
CONSTIPATION: 0
JOINT SWELLING: 0

## 2022-04-07 ASSESSMENT — ASTHMA QUESTIONNAIRES
QUESTION_1 LAST FOUR WEEKS HOW MUCH OF THE TIME DID YOUR ASTHMA KEEP YOU FROM GETTING AS MUCH DONE AT WORK, SCHOOL OR AT HOME: NONE OF THE TIME
QUESTION_5 LAST FOUR WEEKS HOW WOULD YOU RATE YOUR ASTHMA CONTROL: COMPLETELY CONTROLLED
QUESTION_2 LAST FOUR WEEKS HOW OFTEN HAVE YOU HAD SHORTNESS OF BREATH: NOT AT ALL
QUESTION_4 LAST FOUR WEEKS HOW OFTEN HAVE YOU USED YOUR RESCUE INHALER OR NEBULIZER MEDICATION (SUCH AS ALBUTEROL): NOT AT ALL
QUESTION_3 LAST FOUR WEEKS HOW OFTEN DID YOUR ASTHMA SYMPTOMS (WHEEZING, COUGHING, SHORTNESS OF BREATH, CHEST TIGHTNESS OR PAIN) WAKE YOU UP AT NIGHT OR EARLIER THAN USUAL IN THE MORNING: NOT AT ALL
ACT_TOTALSCORE: 25
ACT_TOTALSCORE: 25

## 2022-04-07 NOTE — PATIENT INSTRUCTIONS
Try over the counter zyrtec (Cetirezine)       Patient Education     Preventive Health Recommendations  Female Ages 50 - 64    Yearly exam: See your health care provider every year in order to  o Review health changes.   o Discuss preventive care.    o Review your medicines if your doctor has prescribed any.      Get a Pap test every three years (unless you have an abnormal result and your provider advises testing more often).    If you get Pap tests with HPV test, you only need to test every 5 years, unless you have an abnormal result.     You do not need a Pap test if your uterus was removed (hysterectomy) and you have not had cancer.    You should be tested each year for STDs (sexually transmitted diseases) if you're at risk.     Have a mammogram every 1 to 2 years.    Have a colonoscopy at age 50, or have a yearly FIT test (stool test). These exams screen for colon cancer.      Have a cholesterol test every 5 years, or more often if advised.    Have a diabetes test (fasting glucose) every three years. If you are at risk for diabetes, you should have this test more often.     If you are at risk for osteoporosis (brittle bone disease), think about having a bone density scan (DEXA).    Shots: Get a flu shot each year. Get a tetanus shot every 10 years.    Nutrition:     Eat at least 5 servings of fruits and vegetables each day.    Eat whole-grain bread, whole-wheat pasta and brown rice instead of white grains and rice.    Get adequate Calcium and Vitamin D.     Lifestyle    Exercise at least 150 minutes a week (30 minutes a day, 5 days a week). This will help you control your weight and prevent disease.    Limit alcohol to one drink per day.    No smoking.     Wear sunscreen to prevent skin cancer.     See your dentist every six months for an exam and cleaning.    See your eye doctor every 1 to 2 years.

## 2022-04-07 NOTE — PROGRESS NOTES
SUBJECTIVE:   CC: Carol Ann Anguiano is an 51 year old woman who presents for preventive health visit.       Patient has been advised of split billing requirements and indicates understanding: Yes  Healthy Habits:     Getting at least 3 servings of Calcium per day:  Yes    Bi-annual eye exam:  NO    Dental care twice a year:  NO    Sleep apnea or symptoms of sleep apnea:  None    Diet:  Regular (no restrictions)    Frequency of exercise:  1 day/week    Duration of exercise:  Less than 15 minutes    Taking medications regularly:  Yes    Medication side effects:  None    PHQ-2 Total Score: 0    Additional concerns today:  Yes (repeat lab work)    Asthma- well controlled.   Thyroid-takes her medication daily. No increased constipation. Hair loss happens.  GERD-gets chest pain- she starts and stops for months at a time.     Started with an abnormal urine odor about a week ago.       Today's PHQ-2 Score:   PHQ-2 (  [employer]) 2022   Q1: Little interest or pleasure in doing things 0   Q2: Feeling down, depressed or hopeless 0   PHQ-2 Score 0   PHQ-2 Total Score (12-17 Years)- Positive if 3 or more points; Administer PHQ-A if positive -   Q1: Little interest or pleasure in doing things Not at all   Q2: Feeling down, depressed or hopeless Not at all   PHQ-2 Score 0       Abuse: Current or Past (Physical, Sexual or Emotional) - No  Do you feel safe in your environment? Yes    Have you ever done Advance Care Planning? (For example, a Health Directive, POLST, or a discussion with a medical provider or your loved ones about your wishes): No, advance care planning information given to patient to review.  Patient plans to discuss their wishes with loved ones or provider.      Social History     Tobacco Use     Smoking status: Light Tobacco Smoker     Packs/day: 1.00     Years: 25.00     Pack years: 25.00     Types: Cigarettes     Last attempt to quit: 4/3/2018     Years since quittin.0     Smokeless tobacco: Never  Used   Substance Use Topics     Alcohol use: Yes     Alcohol/week: 0.0 standard drinks     Comment: occasionally     If you drink alcohol do you typically have >3 drinks per day or >7 drinks per week? No    Alcohol Use 4/7/2022   Prescreen: >3 drinks/day or >7 drinks/week? No   Prescreen: >3 drinks/day or >7 drinks/week? -       Reviewed orders with patient.  Reviewed health maintenance and updated orders accordingly - Yes  Lab work is in process    Breast Cancer Screening:    Breast CA Risk Assessment (FHS-7) 4/7/2022   Do you have a family history of breast, colon, or ovarian cancer? No / Unknown         Mammogram Screening: Recommended annual mammography  Pertinent mammograms are reviewed under the imaging tab.    History of abnormal Pap smear: NO - age 30-65 PAP every 5 years with negative HPV co-testing recommended  PAP / HPV Latest Ref Rng & Units 3/13/2018 10/7/2016 5/23/2013   PAP (Historical) - NIL NIL NIL   HPV16 NEG:Negative Negative Negative -   HPV18 NEG:Negative Negative Negative -   HRHPV NEG:Negative Negative Negative -   Patient had history of JONATHON I results noted on her D&C- never had an abnormal pap smear prior to that. Had a cervical biopsy after and no abnormal findings. All subsequent pap smears have been normal.         Reviewed and updated as needed this visit by clinical staff   Tobacco  Allergies  Meds  Problems  Med Hx  Surg Hx  Fam Hx  Soc   Hx        Reviewed and updated as needed this visit by Provider   Tobacco  Allergies  Meds  Problems  Med Hx  Surg Hx  Fam Hx             Review of Systems   Constitutional: Negative for chills and fever.   HENT: Positive for ear pain. Negative for congestion, hearing loss and sore throat.    Eyes: Negative for pain and visual disturbance.   Respiratory: Negative for cough and shortness of breath.    Cardiovascular: Positive for chest pain. Negative for palpitations and peripheral edema.   Gastrointestinal: Positive for heartburn.  "Negative for abdominal pain, constipation, diarrhea, hematochezia and nausea.   Breasts:  Negative for tenderness, breast mass and discharge.   Genitourinary: Negative for dysuria, frequency, genital sores, hematuria, pelvic pain, urgency, vaginal bleeding and vaginal discharge.   Musculoskeletal: Positive for arthralgias. Negative for joint swelling and myalgias.   Skin: Negative for rash.   Neurological: Positive for weakness. Negative for dizziness, headaches and paresthesias.   Psychiatric/Behavioral: Positive for mood changes. The patient is not nervous/anxious.           OBJECTIVE:   /81 (BP Location: Right arm, Patient Position: Chair, Cuff Size: Adult Regular)   Pulse 77   Temp 98.3  F (36.8  C) (Oral)   Ht 1.651 m (5' 5\")   Wt 78.3 kg (172 lb 9.6 oz)   LMP 09/30/2020 (Exact Date)   SpO2 98%   Breastfeeding No   BMI 28.72 kg/m    Physical Exam  GENERAL: healthy, alert and no distress  EYES: Eyes grossly normal to inspection, PERRL and conjunctivae and sclerae normal  HENT: ear canals and TM's normal, nose and mouth without ulcers or lesions  NECK: no adenopathy, no asymmetry, masses, or scars and thyroid normal to palpation  RESP: lungs clear to auscultation - no rales, rhonchi or wheezes  CV: regular rate and rhythm, normal S1 S2, no S3 or S4, no murmur, click or rub, no peripheral edema and peripheral pulses strong  ABDOMEN: soft, nontender, no hepatosplenomegaly, no masses and bowel sounds normal   (female): normal female external genitalia, normal urethral meatus, vaginal mucosa pink, moist, well rugated, and normal cervix without discharge  MS: no gross musculoskeletal defects noted, no edema  SKIN: no suspicious lesions or rashes  NEURO: Normal strength and tone, mentation intact and speech normal  PSYCH: mentation appears normal, affect normal/bright    Diagnostic Test Results:  none     ASSESSMENT/PLAN:   (Z00.00) Routine general medical examination at a health care facility  (primary " "encounter diagnosis)      (Z12.11) Screen for colon cancer  Comment:   Plan: Adult Gastro Ref - Procedure Only            (Z13.220) Screening for hyperlipidemia  Comment:   Plan: Lipid panel reflex to direct LDL Fasting            (Z12.31) Visit for screening mammogram  Comment:   Plan: MA SCREENING DIGITAL BILAT - Future  (s+30)            (Z12.4) Cervical cancer screening  Comment:   Plan: Pap Screen with HPV - recommended age 30 - 65         years            (E03.9) Hypothyroidism, unspecified type  Comment:   Plan: TSH WITH FREE T4 REFLEX        Due for labs. Recheck.     (F17.200) Tobacco use disorder  Plan: Encouraged smoking cessation.     (R79.89) Abnormal CBC  Comment:   Plan: CBC with Platelets & Differential, Vitamin B12,        Basic metabolic panel        Recheck.     (R82.90) Abnormal urine odor  Plan: UA reflex to Microscopic and Culture, Wet         preparation, Urine Microscopic        Check .    (R19.8) Globus pharyngeus  Comment:   Plan: Omeprazole as needed.         COUNSELING:  Reviewed preventive health counseling, as reflected in patient instructions       Regular exercise       Healthy diet/nutrition    Estimated body mass index is 28.72 kg/m  as calculated from the following:    Height as of this encounter: 1.651 m (5' 5\").    Weight as of this encounter: 78.3 kg (172 lb 9.6 oz).    Weight management plan: Discussed healthy diet and exercise guidelines    She reports that she has been smoking cigarettes. She has a 25.00 pack-year smoking history. She has never used smokeless tobacco.  Tobacco Cessation Action Plan:   Pharmacotherapies : Nicotine patch      Counseling Resources:  ATP IV Guidelines  Pooled Cohorts Equation Calculator  Breast Cancer Risk Calculator  BRCA-Related Cancer Risk Assessment: FHS-7 Tool  FRAX Risk Assessment  ICSI Preventive Guidelines  Dietary Guidelines for Americans, 2010  USDA's MyPlate  ASA Prophylaxis  Lung CA Screening    Anastasiya Turner PA-C  [facility] " [facility] [place]

## 2022-04-07 NOTE — NURSING NOTE
Verified patients Name and .  Elvia GUZMÁN MA  VIS for Tdap and Shingles given on same date of administration.  Staff signature/Title: Elvia GUZMÁN MA     Due to injection administration, patient instructed to remain in clinic for 15 minutes  afterwards, and to report any adverse reaction to me immediately.      Pt declined getting the Tdap vaccine at this time.     Elvia GUZMÁN MA

## 2022-04-09 NOTE — PROGRESS NOTES
DISCHARGE SUMMARY    Carol Ann Anguiano was seen 2 times for evaluation and treatment.  Patient did not return for further treatment and current status is unknown.  Due to short treatment duration, no objective or functional changes were made.  Please see goal flow sheet from episode noted date below and initial evaluation for further information.  Patient is discharged from therapy and therapy episode is resolved as of 03/24/20.      No linked episodes       Negative

## 2022-04-11 LAB
BKR LAB AP GYN ADEQUACY: NORMAL
BKR LAB AP GYN INTERPRETATION: NORMAL
BKR LAB AP HPV REFLEX: NORMAL
BKR LAB AP PREVIOUS ABNORMAL: NORMAL
PATH REPORT.COMMENTS IMP SPEC: NORMAL
PATH REPORT.COMMENTS IMP SPEC: NORMAL
PATH REPORT.RELEVANT HX SPEC: NORMAL

## 2022-04-14 LAB
HUMAN PAPILLOMA VIRUS 16 DNA: NEGATIVE
HUMAN PAPILLOMA VIRUS 18 DNA: NEGATIVE
HUMAN PAPILLOMA VIRUS FINAL DIAGNOSIS: NORMAL
HUMAN PAPILLOMA VIRUS OTHER HR: NEGATIVE

## 2022-05-17 ENCOUNTER — TRANSFERRED RECORDS (OUTPATIENT)
Dept: HEALTH INFORMATION MANAGEMENT | Facility: CLINIC | Age: 52
End: 2022-05-17
Payer: COMMERCIAL

## 2022-05-26 DIAGNOSIS — E03.9 HYPOTHYROIDISM, UNSPECIFIED TYPE: ICD-10-CM

## 2022-05-26 RX ORDER — LEVOTHYROXINE SODIUM 200 UG/1
200 TABLET ORAL DAILY
Qty: 90 TABLET | Refills: 3 | Status: SHIPPED | OUTPATIENT
Start: 2022-05-26 | End: 2023-10-17

## 2022-05-26 NOTE — TELEPHONE ENCOUNTER
Routing refill request to provider for review/approval because:  Labs out of range:    TSH   Date Value Ref Range Status   04/07/2022 0.02 (L) 0.40 - 4.00 mU/L Final   01/19/2021 0.02 (L) 0.40 - 4.00 mU/L Final     Shawanda Julien RN, BSN, PHN  [facility]: [place]

## 2022-09-29 ENCOUNTER — VIRTUAL VISIT (OUTPATIENT)
Dept: FAMILY MEDICINE | Facility: CLINIC | Age: 52
End: 2022-09-29
Payer: COMMERCIAL

## 2022-09-29 DIAGNOSIS — R42 VERTIGO: Primary | ICD-10-CM

## 2022-09-29 DIAGNOSIS — E53.8 VITAMIN B12 DEFICIENCY (NON ANEMIC): ICD-10-CM

## 2022-09-29 PROCEDURE — 99214 OFFICE O/P EST MOD 30 MIN: CPT | Mod: GT | Performed by: FAMILY MEDICINE

## 2022-09-29 RX ORDER — MECLIZINE HYDROCHLORIDE 25 MG/1
25 TABLET ORAL 3 TIMES DAILY PRN
Qty: 20 TABLET | Refills: 1 | Status: SHIPPED | OUTPATIENT
Start: 2022-09-29

## 2022-09-29 RX ORDER — ONDANSETRON 4 MG/1
4 TABLET, ORALLY DISINTEGRATING ORAL EVERY 8 HOURS PRN
Qty: 8 TABLET | Refills: 0 | Status: SHIPPED | OUTPATIENT
Start: 2022-09-29

## 2022-09-29 ASSESSMENT — ASTHMA QUESTIONNAIRES
QUESTION_1 LAST FOUR WEEKS HOW MUCH OF THE TIME DID YOUR ASTHMA KEEP YOU FROM GETTING AS MUCH DONE AT WORK, SCHOOL OR AT HOME: NONE OF THE TIME
QUESTION_3 LAST FOUR WEEKS HOW OFTEN DID YOUR ASTHMA SYMPTOMS (WHEEZING, COUGHING, SHORTNESS OF BREATH, CHEST TIGHTNESS OR PAIN) WAKE YOU UP AT NIGHT OR EARLIER THAN USUAL IN THE MORNING: NOT AT ALL
QUESTION_2 LAST FOUR WEEKS HOW OFTEN HAVE YOU HAD SHORTNESS OF BREATH: NOT AT ALL
QUESTION_5 LAST FOUR WEEKS HOW WOULD YOU RATE YOUR ASTHMA CONTROL: WELL CONTROLLED
ACT_TOTALSCORE: 23
QUESTION_4 LAST FOUR WEEKS HOW OFTEN HAVE YOU USED YOUR RESCUE INHALER OR NEBULIZER MEDICATION (SUCH AS ALBUTEROL): ONCE A WEEK OR LESS
ACT_TOTALSCORE: 23

## 2022-09-29 NOTE — PROGRESS NOTES
"Carol Ann is a 52 year old who is being evaluated via a billable video visit.      How would you like to obtain your AVS? MyChart  If the video visit is dropped, the invitation should be resent by: Other e-mail: Per my chart  Will anyone else be joining your video visit? No        Assessment & Plan     Vertigo  - meclizine (ANTIVERT) 25 MG tablet; Take 1 tablet (25 mg) by mouth 3 times daily as needed for dizziness  - ondansetron (ZOFRAN ODT) 4 MG ODT tab; Take 1 tablet (4 mg) by mouth every 8 hours as needed for nausea    Vitamin B12 deficiency (non anemic)  - Vitamin B12; Future  - CBC with platelets and differential; Future         BMI:   Estimated body mass index is 28.72 kg/m  as calculated from the following:    Height as of 4/7/22: 1.651 m (5' 5\").    Weight as of 4/7/22: 78.3 kg (172 lb 9.6 oz).       No follow-ups on file.   Follow-up Visit   Expected date:  Oct 13, 2022 (Approximate)      Follow Up Appointment Details:     Follow-up with whom?: PCP    Follow-Up for what?: Other (Office Visit)    Additional Details: follow up of symptoms    How?: In Person    Is this an as-needed follow-up?: Yes                    Mitzi Florez MD  [facility]    Toshia Maharaj is a 52 year old, presenting for the following health issues:  No chief complaint on file.  The patient is new to provider, requesting a B12 lab due to vertigo, low back pain and Bilateral hand paraesthesia. Symptoms Happened in the past and her B12 was low, she was on supplements but had to be discontinued due to elevated B12 levels. Of note she does have an over suppressed thyroid with Low TSH, the patient stated she and her provider are aware of this and have tried titrating her medications but hypothyroidism ensues of which she gets symptomatic. She denies chest pain, shortness of breath, dyspnea, neurological deficit or headaches.  Dizziness      Duration: 1-3 days    Description   Feeling faint:  no   Feeling like the " surroundings are moving: YES  Loss of consciousness or falls: no     Intensity:  moderate, severe    Accompanying signs and symptoms:   Nausea/vomitting: YES  Palpitations: no   Weakness in arms or legs: no   Vision or speech changes: no   Ringing in ears (Tinnitus): no   Hearing loss related to dizziness: no   Other (fevers/chills/sweating/dyspnea): no     History (similar episodes/head trauma/previous evaluation/recent bleeding): None    Precipitating or alleviating factors (new meds/chemicals): None  Worse with activity/head movement: no     Therapies tried and outcome: None      History of Present Illness       Reason for visit:  Nauseated dizziness tingling  hands and feet  Symptom onset:  3-7 days ago  Symptoms include:  Is same as the reason for my visit today  Symptom intensity:  Moderate  Symptom progression:  Worsening  Had these symptoms before:  No  What makes it worse:  No  What makes it better:  No    She eats 2-3 servings of fruits and vegetables daily.She consumes 0 sweetened beverage(s) daily.She exercises with enough effort to increase her heart rate 9 or less minutes per day.  She exercises with enough effort to increase her heart rate 3 or less days per week. She is missing 1 dose(s) of medications per week.  She is not taking prescribed medications regularly due to remembering to take.      Review of Systems   Constitutional, HEENT, cardiovascular, pulmonary, GI, , musculoskeletal, neuro, skin, endocrine and psych systems are negative, except as otherwise noted.      Objective           Vitals:  No vitals were obtained today due to virtual visit.    Physical Exam   GENERAL: Healthy, alert and no distress  EYES: Eyes grossly normal to inspection.  No discharge or erythema, or obvious scleral/conjunctival abnormalities.  RESP: No audible wheeze, cough, or visible cyanosis.  No visible retractions or increased work of breathing.    SKIN: Visible skin clear. No significant rash, abnormal  pigmentation or lesions.  NEURO: Cranial nerves grossly intact.  Mentation and speech appropriate for age.  PSYCH: Mentation appears normal, affect normal/bright, judgement and insight intact, normal speech and appearance well-groomed.            Video-Visit Details    Video Start Time: 09:20AM    Type of service:  Video Visit    Video End Time:10:00AM    Originating Location (pt. Location): Home    Distant Location (provider location):  [facility]     Platform used for Video Visit: [employer]

## 2022-09-30 ENCOUNTER — LAB (OUTPATIENT)
Dept: LAB | Facility: CLINIC | Age: 52
End: 2022-09-30
Payer: COMMERCIAL

## 2022-09-30 DIAGNOSIS — E53.8 VITAMIN B12 DEFICIENCY (NON ANEMIC): ICD-10-CM

## 2022-09-30 LAB
BASOPHILS # BLD AUTO: 0 10E3/UL (ref 0–0.2)
BASOPHILS NFR BLD AUTO: 0 %
EOSINOPHIL # BLD AUTO: 0.6 10E3/UL (ref 0–0.7)
EOSINOPHIL NFR BLD AUTO: 8 %
ERYTHROCYTE [DISTWIDTH] IN BLOOD BY AUTOMATED COUNT: 13.7 % (ref 10–15)
HCT VFR BLD AUTO: 46.5 % (ref 35–47)
HGB BLD-MCNC: 15.6 G/DL (ref 11.7–15.7)
LYMPHOCYTES # BLD AUTO: 3.2 10E3/UL (ref 0.8–5.3)
LYMPHOCYTES NFR BLD AUTO: 43 %
MCH RBC QN AUTO: 29.3 PG (ref 26.5–33)
MCHC RBC AUTO-ENTMCNC: 33.5 G/DL (ref 31.5–36.5)
MCV RBC AUTO: 87 FL (ref 78–100)
MONOCYTES # BLD AUTO: 0.5 10E3/UL (ref 0–1.3)
MONOCYTES NFR BLD AUTO: 7 %
NEUTROPHILS # BLD AUTO: 3.2 10E3/UL (ref 1.6–8.3)
NEUTROPHILS NFR BLD AUTO: 42 %
PLATELET # BLD AUTO: 348 10E3/UL (ref 150–450)
RBC # BLD AUTO: 5.33 10E6/UL (ref 3.8–5.2)
VIT B12 SERPL-MCNC: 910 PG/ML (ref 232–1245)
WBC # BLD AUTO: 7.6 10E3/UL (ref 4–11)

## 2022-09-30 PROCEDURE — 85025 COMPLETE CBC W/AUTO DIFF WBC: CPT

## 2022-09-30 PROCEDURE — 82607 VITAMIN B-12: CPT

## 2022-09-30 PROCEDURE — 36415 COLL VENOUS BLD VENIPUNCTURE: CPT

## 2022-10-10 ENCOUNTER — HEALTH MAINTENANCE LETTER (OUTPATIENT)
Age: 52
End: 2022-10-10

## 2023-02-17 NOTE — NURSING NOTE
"Chief Complaint   Patient presents with     Mole       Initial /80 (BP Location: Right arm, Cuff Size: Adult Regular)  Pulse 76  Temp 98.7  F (37.1  C) (Oral)  Ht 5' 5\" (1.651 m)  Wt 171 lb (77.6 kg)  LMP 01/02/2017  BMI 28.46 kg/m2 Estimated body mass index is 28.46 kg/(m^2) as calculated from the following:    Height as of this encounter: 5' 5\" (1.651 m).    Weight as of this encounter: 171 lb (77.6 kg).  Medication Reconciliation: complete     Leonarda Arora MA     " 214.9

## 2023-05-13 DIAGNOSIS — J45.20 INTERMITTENT ASTHMA, UNCOMPLICATED: ICD-10-CM

## 2023-05-14 NOTE — TELEPHONE ENCOUNTER
"Routing refill request to provider for review/approval because:  act    Last Written Prescription Date:  2/8/23  Last Fill Quantity: 18,  # refills: 1   Last office visit provider:  4/7/22     Requested Prescriptions   Pending Prescriptions Disp Refills     albuterol (PROAIR HFA/PROVENTIL HFA/VENTOLIN HFA) 108 (90 Base) MCG/ACT inhaler [Pharmacy Med Name: ALBUTEROL HFA INH(200 PUFFS) 18GM] 18 g 1     Sig: INHALE 2 PUFFS INTO THE LUNGS EVERY 6 HOURS AS NEEDED FOR SHORTNESS OF BREATH OR DIFFICULT BREATHING OR WHEEZING       Asthma Maintenance Inhalers - Anticholinergics Failed - 5/13/2023  8:45 AM        Failed - Asthma control assessment score within normal limits in last 6 months     Please review ACT score.           Failed - Recent (6 mo) or future (30 days) visit within the authorizing provider's specialty     Patient had office visit in the last 6 months or has a visit in the next 30 days with authorizing provider or within the authorizing provider's specialty.  See \"Patient Info\" tab in inbasket, or \"Choose Columns\" in Meds & Orders section of the refill encounter.            Passed - Patient is age 12 years or older        Passed - Medication is active on med list       Short-Acting Beta Agonist Inhalers Protocol  Failed - 5/13/2023  8:45 AM        Failed - Asthma control assessment score within normal limits in last 6 months     Please review ACT score.           Failed - Recent (6 mo) or future (30 days) visit within the authorizing provider's specialty     Patient had office visit in the last 6 months or has a visit in the next 30 days with authorizing provider or within the authorizing provider's specialty.  See \"Patient Info\" tab in inbasket, or \"Choose Columns\" in Meds & Orders section of the refill encounter.            Passed - Patient is age 12 or older        Passed - Medication is active on med list             Lois Malloy RN 05/14/23 10:46 AM  "

## 2023-05-15 RX ORDER — ALBUTEROL SULFATE 90 UG/1
AEROSOL, METERED RESPIRATORY (INHALATION)
Qty: 18 G | Refills: 1 | OUTPATIENT
Start: 2023-05-15

## 2023-05-15 RX ORDER — ALBUTEROL SULFATE 90 UG/1
AEROSOL, METERED RESPIRATORY (INHALATION)
Qty: 18 G | Refills: 1 | Status: SHIPPED | OUTPATIENT
Start: 2023-05-15 | End: 2023-12-18

## 2023-05-27 ENCOUNTER — HEALTH MAINTENANCE LETTER (OUTPATIENT)
Age: 53
End: 2023-05-27

## 2023-08-19 ENCOUNTER — HEALTH MAINTENANCE LETTER (OUTPATIENT)
Age: 53
End: 2023-08-19

## 2023-10-16 DIAGNOSIS — E03.9 HYPOTHYROIDISM, UNSPECIFIED TYPE: ICD-10-CM

## 2023-10-17 RX ORDER — LEVOTHYROXINE SODIUM 200 UG/1
200 TABLET ORAL DAILY
Qty: 90 TABLET | Refills: 3 | Status: SHIPPED | OUTPATIENT
Start: 2023-10-17 | End: 2023-11-08

## 2023-10-17 NOTE — TELEPHONE ENCOUNTER
Patient overdue for physical. Please help her scheduled then will refill.   Anastasiya Turner PA-C

## 2023-11-08 ENCOUNTER — OFFICE VISIT (OUTPATIENT)
Dept: FAMILY MEDICINE | Facility: CLINIC | Age: 53
End: 2023-11-08
Payer: COMMERCIAL

## 2023-11-08 VITALS
WEIGHT: 167 LBS | DIASTOLIC BLOOD PRESSURE: 74 MMHG | HEART RATE: 72 BPM | TEMPERATURE: 98.8 F | OXYGEN SATURATION: 99 % | BODY MASS INDEX: 27.82 KG/M2 | HEIGHT: 65 IN | SYSTOLIC BLOOD PRESSURE: 111 MMHG | RESPIRATION RATE: 19 BRPM

## 2023-11-08 DIAGNOSIS — Z12.31 VISIT FOR SCREENING MAMMOGRAM: ICD-10-CM

## 2023-11-08 DIAGNOSIS — L85.8 KERATOSIS PILARIS: ICD-10-CM

## 2023-11-08 DIAGNOSIS — E03.9 HYPOTHYROIDISM, UNSPECIFIED TYPE: ICD-10-CM

## 2023-11-08 DIAGNOSIS — Z87.891 PERSONAL HISTORY OF TOBACCO USE: ICD-10-CM

## 2023-11-08 DIAGNOSIS — Z12.11 SCREEN FOR COLON CANCER: ICD-10-CM

## 2023-11-08 DIAGNOSIS — Z00.00 ROUTINE GENERAL MEDICAL EXAMINATION AT A HEALTH CARE FACILITY: Primary | ICD-10-CM

## 2023-11-08 LAB
ERYTHROCYTE [DISTWIDTH] IN BLOOD BY AUTOMATED COUNT: 13.1 % (ref 10–15)
HCT VFR BLD AUTO: 48.9 % (ref 35–47)
HGB BLD-MCNC: 16.4 G/DL (ref 11.7–15.7)
MCH RBC QN AUTO: 30 PG (ref 26.5–33)
MCHC RBC AUTO-ENTMCNC: 33.5 G/DL (ref 31.5–36.5)
MCV RBC AUTO: 89 FL (ref 78–100)
PLATELET # BLD AUTO: 319 10E3/UL (ref 150–450)
RBC # BLD AUTO: 5.47 10E6/UL (ref 3.8–5.2)
T4 FREE SERPL-MCNC: 2.71 NG/DL (ref 0.9–1.7)
TSH SERPL DL<=0.005 MIU/L-ACNC: 0.01 UIU/ML (ref 0.3–4.2)
WBC # BLD AUTO: 7.3 10E3/UL (ref 4–11)

## 2023-11-08 PROCEDURE — 85027 COMPLETE CBC AUTOMATED: CPT | Performed by: PHYSICIAN ASSISTANT

## 2023-11-08 PROCEDURE — 84439 ASSAY OF FREE THYROXINE: CPT | Performed by: PHYSICIAN ASSISTANT

## 2023-11-08 PROCEDURE — 36415 COLL VENOUS BLD VENIPUNCTURE: CPT | Performed by: PHYSICIAN ASSISTANT

## 2023-11-08 PROCEDURE — 99396 PREV VISIT EST AGE 40-64: CPT | Mod: 25 | Performed by: PHYSICIAN ASSISTANT

## 2023-11-08 PROCEDURE — 84443 ASSAY THYROID STIM HORMONE: CPT | Performed by: PHYSICIAN ASSISTANT

## 2023-11-08 PROCEDURE — 90471 IMMUNIZATION ADMIN: CPT | Performed by: PHYSICIAN ASSISTANT

## 2023-11-08 PROCEDURE — G0296 VISIT TO DETERM LDCT ELIG: HCPCS | Performed by: PHYSICIAN ASSISTANT

## 2023-11-08 PROCEDURE — 90686 IIV4 VACC NO PRSV 0.5 ML IM: CPT | Performed by: PHYSICIAN ASSISTANT

## 2023-11-08 RX ORDER — LEVOTHYROXINE SODIUM 200 UG/1
200 TABLET ORAL DAILY
Qty: 90 TABLET | Refills: 3 | Status: SHIPPED | OUTPATIENT
Start: 2023-11-08 | End: 2023-11-09

## 2023-11-08 RX ORDER — SODIUM SULFACETAMIDE AND SULFUR 80; 40 MG/ML; MG/ML
SOLUTION TOPICAL
Qty: 473 ML | Refills: 11 | Status: SHIPPED | OUTPATIENT
Start: 2023-11-08 | End: 2023-11-08

## 2023-11-08 RX ORDER — AMMONIUM LACTATE 12 G/100G
CREAM TOPICAL DAILY PRN
Qty: 385 G | Refills: 11 | Status: SHIPPED | OUTPATIENT
Start: 2023-11-08

## 2023-11-08 RX ORDER — SODIUM SULFACETAMIDE AND SULFUR 80; 40 MG/ML; MG/ML
SOLUTION TOPICAL
Qty: 1419 ML | Refills: 3 | Status: SHIPPED | OUTPATIENT
Start: 2023-11-08

## 2023-11-08 ASSESSMENT — ASTHMA QUESTIONNAIRES: ACT_TOTALSCORE: 23

## 2023-11-08 ASSESSMENT — ENCOUNTER SYMPTOMS
CONSTIPATION: 1
CHILLS: 0
ARTHRALGIAS: 1
FREQUENCY: 0
HEMATOCHEZIA: 0
PALPITATIONS: 0
HEADACHES: 1
DIZZINESS: 1
JOINT SWELLING: 0
WEAKNESS: 1
NERVOUS/ANXIOUS: 1
PARESTHESIAS: 0
NAUSEA: 1
DIARRHEA: 0
ABDOMINAL PAIN: 0
HEARTBURN: 1
COUGH: 0
BREAST MASS: 0
FEVER: 0
HEMATURIA: 0
EYE PAIN: 0
SHORTNESS OF BREATH: 0
DYSURIA: 0
SORE THROAT: 0
MYALGIAS: 1

## 2023-11-08 NOTE — PROGRESS NOTES
SUBJECTIVE:   CC: Carol Ann is an 53 year old who presents for preventive health visit.       2023     7:02 AM   Additional Questions   Roomed by lorraine valentine   Nausea and dizziness after forgot thyroid meds, 2 weeks ago  After took meds went away 3 days later  Last week got sick: dizziness, nausea, inner ear pain, didn't feel like eating  Yesterday bent over couldn't   Still lethargic, headaches in the morning, lingering sxs   Consistent ear problem: steroid, controls pain and itching. Happening for 8 years.   Colonoscopy, Lung cancer screening ([id number], 1ppd)  Wants refilled  Ammonium lactate  Sodium sulfac  Ashwaghanda  Helps with dizziness and depression   Ask about reaction with levothryoxine  Omeprazole  Takes PRN  Takes when has sxs for two weeks and then does not have to take for 4-6 months after    Healthy Habits:     Getting at least 3 servings of Calcium per day:  Yes    Bi-annual eye exam:  NO    Dental care twice a year:  NO    Sleep apnea or symptoms of sleep apnea:  Daytime drowsiness    Diet:  Regular (no restrictions)    Frequency of exercise:  1 day/week    Duration of exercise:  30-45 minutes    Taking medications regularly:  Yes    Medication side effects:  Not applicable    Additional concerns today:  Yes    Today's PHQ-2 Score:       2023     6:39 AM   PHQ-2 (  [employer])   Q1: Little interest or pleasure in doing things 1   Q2: Feeling down, depressed or hopeless 0   PHQ-2 Score 1   Q1: Little interest or pleasure in doing things Several days   Q2: Feeling down, depressed or hopeless Not at all   PHQ-2 Score 1             Social History     Tobacco Use    Smoking status: Every Day     Packs/day: 1.00     Years: 25.00     Additional pack years: 0.00     Total pack years: 25.00     Types: Cigarettes     Last attempt to quit: 4/3/2018     Years since quittin.6    Smokeless tobacco: Never   Substance Use Topics    Alcohol use: Yes     Comment: occasionally             2023     6:38  AM   Alcohol Use   Prescreen: >3 drinks/day or >7 drinks/week? No     Reviewed orders with patient.  Reviewed health maintenance and updated orders accordingly - Yes  Lab work is in process    Breast Cancer Screenin/7/2022     6:50 AM   Breast CA Risk Assessment (FHS-7)   Do you have a family history of breast, colon, or ovarian cancer? No / Unknown     click delete button to remove this line now  Mammogram Screening: Recommended annual mammography  Pertinent mammograms are reviewed under the imaging tab.    History of abnormal Pap smear: NO - age 30-65 PAP every 5 years with negative HPV co-testing recommended      Latest Ref Rng & Units 2022     7:20 AM 3/13/2018    10:24 AM 3/13/2018    10:10 AM   PAP / HPV   PAP  Negative for Intraepithelial Lesion or Malignancy (NILM)      PAP (Historical)   NIL     HPV 16 DNA Negative Negative   Negative    HPV 18 DNA Negative Negative   Negative    Other HR HPV Negative Negative   Negative      Reviewed and updated as needed this visit by clinical staff   Tobacco   Meds            Reviewed and updated as needed this visit by Provider                     Review of Systems   Constitutional:  Negative for chills and fever.   HENT:  Positive for ear pain. Negative for congestion, hearing loss and sore throat.    Eyes:  Negative for pain and visual disturbance.   Respiratory:  Negative for cough and shortness of breath.    Cardiovascular:  Negative for chest pain, palpitations and peripheral edema.   Gastrointestinal:  Positive for constipation, heartburn and nausea. Negative for abdominal pain, diarrhea and hematochezia.   Breasts:  Negative for tenderness, breast mass and discharge.   Genitourinary:  Negative for dysuria, frequency, genital sores, hematuria, pelvic pain, urgency, vaginal bleeding and vaginal discharge.   Musculoskeletal:  Positive for arthralgias and myalgias. Negative for joint swelling.   Skin:  Negative for rash.   Neurological:  Positive for  "dizziness, weakness and headaches. Negative for paresthesias.   Psychiatric/Behavioral:  Positive for mood changes. The patient is nervous/anxious.           OBJECTIVE:   /74 (BP Location: Left arm, Patient Position: Chair, Cuff Size: Adult Large)   Pulse 72   Temp 98.8  F (37.1  C) (Oral)   Resp 19   Ht 1.651 m (5' 5\")   Wt 75.8 kg (167 lb)   LMP 09/30/2020 (Exact Date)   SpO2 99%   BMI 27.79 kg/m      Physical Exam  GENERAL APPEARANCE: healthy, alert and no distress  EYES: Eyes grossly normal to inspection, PERRL and conjunctivae and sclerae normal  HENT: ear canals and TM's normal, nose and mouth without ulcers or lesions, oropharynx clear and oral mucous membranes moist. Cerumen noted bilaterally.  NECK: no adenopathy, no asymmetry, masses, or scars and thyroid normal to palpation  RESP: lungs clear to auscultation - no rales, rhonchi or wheezes  CV: regular rate and rhythm, normal S1 S2, no S3 or S4, no murmur, click or rub, no peripheral edema   ABDOMEN: soft, nontender, no hepatosplenomegaly, no masses and bowel sounds normal. Mild epigastric tenderness.  MS: no musculoskeletal defects are noted and gait is age appropriate without ataxia  SKIN: no suspicious lesions or rashes  NEURO: Normal strength and tone, sensory exam grossly normal, mentation intact and speech normal  PSYCH: mentation appears normal and affect normal/bright    Diagnostic Test Results:  Labs reviewed in Epic    ASSESSMENT/PLAN:   (Z00.00) Routine general medical examination at a health care facility  (primary encounter diagnosis)    (Z12.11) Screen for colon cancer  Plan: Patient will schedule    (E03.9) Hypothyroidism, unspecified type  Plan: TSH WITH FREE T4 REFLEX, CBC with platelets,         levothyroxine (SYNTHROID/LEVOTHROID) 200 MCG         tablet    (Z12.31) Visit for screening mammogram  Plan: MA SCREENING DIGITAL BILAT - Future  (s+30)    (Z87.891) Personal history of tobacco use  Plan: Prof fee: Shared Decision " "Making for Lung         Cancer Screening, CT Chest Lung Cancer Scrn Low        Dose wo    (L85.8) Keratosis pilaris  Comment: Patient goes to [facility] and was not able to get in for her appointment to refill meds.  Plan: Sulfacetamide Sodium-Sulfur 8-4 % SUSP,         ammonium lactate (AMLACTIN) 12 % external cream    Discussed with patient to monitor vertigo symptoms. Given that there is no associated tinnitus or hearing loss, there is less concern. If new or worsening symptoms present, advised to follow up.     Ashwaghanda, if taken, should be taken at least four hours apart from levothyroxine for maximal absorption.     Restart Omeprazole for a couple of weeks to see if that improves nausea. Advised patient to follow up if Omeprazole is not controlling symptoms.     Patient was agreeable with the plan.       COUNSELING:  Reviewed preventive health counseling, as reflected in patient instructions    BMI:   Estimated body mass index is 27.79 kg/m  as calculated from the following:    Height as of this encounter: 1.651 m (5' 5\").    Weight as of this encounter: 75.8 kg (167 lb).   Weight management plan: Discussed healthy diet and exercise guidelines    She reports that she has been smoking cigarettes. She has a 25.00 pack-year smoking history. She has never used smokeless tobacco.  Nicotine/Tobacco Cessation Plan:   Information offered: Patient not interested at this time          DARION Cooley on 11/8/2023 at 8:23 AM  [facility]  The student SERJIO Contreras acted as a scribe and the encounter documented above was completely performed by myself and the documentation reflects the work I have performed today.   Anastasiya Turner PA-C     "

## 2023-11-08 NOTE — PROGRESS NOTES
Lung Cancer Screening Shared Decision Making Visit     Carol Ann Anguiano, a 53 year old female, is eligible for lung cancer screening    History   Smoking Status     Every Day     Packs/day: 1.00     Years: 25.00     Types: Cigarettes     Last attempt to quit: 4/3/2018   Smokeless Tobacco     Never       I have discussed with patient the risks and benefits of screening for lung cancer with low-dose CT.     The risks include:    radiation exposure: one low dose chest CT has as much ionizing radiation as about 15 chest x-rays, or 6 months of background radiation living in [place]      false positives: most findings/nodules are NOT cancer, but some might still require additional diagnostic evaluation, including biopsy    over-diagnosis: some slow growing cancers that might never have been clinically significant will be detected and treated unnecessarily     The benefit of early detection of lung cancer is contingent upon adherence to annual screening or more frequent follow up if indicated.     Furthermore, to benefit from screening, Carol Ann must be willing and able to undergo diagnostic procedures, if indicated. Although no specific guide is available for determining severity of comorbidities, it is reasonable to withhold screening in patients who have greater mortality risk from other diseases.     We did discuss that the best way to prevent lung cancer is to not smoke.    Some patients may value a numeric estimation of lung cancer risk when evaluating if lung cancer screening is right for them, here is one calculator:    ShouldIScreen

## 2023-11-08 NOTE — PATIENT INSTRUCTIONS
Preventive Health Recommendations  Female Ages 50 - 64    Yearly exam: See your health care provider every year in order to  Review health changes.   Discuss preventive care.    Review your medicines if your doctor has prescribed any.    Get a Pap test every three years (unless you have an abnormal result and your provider advises testing more often).  If you get Pap tests with HPV test, you only need to test every 5 years, unless you have an abnormal result.   You do not need a Pap test if your uterus was removed (hysterectomy) and you have not had cancer.  You should be tested each year for STDs (sexually transmitted diseases) if you're at risk.   Have a mammogram every 1 to 2 years.  Have a colonoscopy at age 45, or have a yearly FIT test (stool test). These exams screen for colon cancer.    Have a cholesterol test every 5 years, or more often if advised.  Have a diabetes test (fasting glucose) every three years. If you are at risk for diabetes, you should have this test more often.   If you are at risk for osteoporosis (brittle bone disease), think about having a bone density scan (DEXA).    Shots: Get a flu shot each year. Get a tetanus shot every 10 years.    Nutrition:   Eat at least 5 servings of fruits and vegetables each day.  Eat whole-grain bread, whole-wheat pasta and brown rice instead of white grains and rice.  Get adequate Calcium and Vitamin D.     Lifestyle  Exercise at least 150 minutes a week (30 minutes a day, 5 days a week). This will help you control your weight and prevent disease.  Limit alcohol to one drink per day.  No smoking.   Wear sunscreen to prevent skin cancer.   See your dentist every six months for an exam and cleaning.  See your eye doctor every 1 to 2 years.    Lung Cancer Screening   Frequently Asked Questions  If you are at high-risk for lung cancer, getting screened with low-dose computed tomography (LDCT) every year can help save your life. This handout offers answers to  some of the most common questions about lung cancer screening. If you have other questions, please call [phone] ([phone]).     What is it?  Lung cancer screening uses special X-ray technology to create an image of your lung tissue. The exam is quick and easy and takes less than 10 seconds. We don t give you any medicine or use any needles. You can eat before and after the exam. You don t need to change your clothes as long as the clothing on your chest doesn t contain metal. But, you do need to be able to hold your breath for at least 6 seconds during the exam.    What is the goal of lung cancer screening?  The goal of lung cancer screening is to save lives. Many times, lung cancer is not found until a person starts having physical symptoms. Lung cancer screening can help detect lung cancer in the earliest stages when it may be easier to treat.    Who should be screened for lung cancer?  We suggest lung cancer screening for anyone who is at high-risk for lung cancer. You are in the high-risk group if you:      are between the ages of 55 and 79, and    have smoked at least 1 pack of cigarettes a day for 20 or more years, and    still smoke or have quit within the past 15 years.    However, if you have a new cough or shortness of breath, you should talk to your doctor before being screened.    Why does it matter if I have symptoms?  Certain symptoms can be a sign that you have a condition in your lungs that should be checked and treated by your doctor. These symptoms include fever, chest pain, a new or changing cough, shortness of breath that you have never felt before, coughing up blood or unexplained weight loss. Having any of these symptoms can greatly affect the results of lung cancer screening.       Should all smokers get an LDCT lung cancer screening exam?  It depends. Lung cancer screening is for a very specific group of men and women who have a history of heavy smoking over a long period of  time (see  Who should be screened for lung cancer  above).  I am in the high-risk group, but have been diagnosed with cancer in the past. Is LDCT lung cancer screening right for me?  In some cases, you should not have LDCT lung screening, such as when your doctor is already following your cancer with CT scan studies. Your doctor will help you decide if LDCT lung screening is right for you.  Do I need to have a screening exam every year?  Yes. If you are in the high-risk group described earlier, you should get an LDCT lung cancer screening exam every year until you are 79, or are no longer willing or able to undergo screening and possible procedures to diagnose and treat lung cancer.  How effective is LDCT at preventing death from lung cancer?  Studies have shown that LDCT lung cancer screening can lower the risk of death from lung cancer by 20 percent in people who are at high-risk.  What are the risks?  There are some risks and limitations of LDCT lung cancer screening. We want to make sure you understand the risks and benefits, so please let us know if you have any questions. Your doctor may want to talk with you more about these risks.    Radiation exposure: As with any exam that uses radiation, there is a very small increased risk of cancer. The amount of radiation in LDCT is small--about the same amount a person would get from a mammogram. Your doctor orders the exam when he or she feels the potential benefits outweigh the risks.    False negatives: No test is perfect, including LDCT. It is possible that you may have a medical condition, including lung cancer, that is not found during your exam. This is called a false negative result.    False positives and more testing: LDCT very often finds something in the lung that could be cancer, but in fact is not. This is called a false positive result. False positive tests often cause anxiety. To make sure these findings are not cancer, you may need to have more tests.  These tests will be done only if you give us permission. Sometimes patients need a treatment that can have side effects, such as a biopsy. For more information on false positives, see  What can I expect from the results?     Findings not related to lung cancer: Your LDCT exam also takes pictures of areas of your body next to your lungs. In a very small number of cases, the CT scan will show an abnormal finding in one of these areas, such as your kidneys, adrenal glands, liver or thyroid. This finding may not be serious, but you may need more tests. Your doctor can help you decide what other tests you may need, if any.  What can I expect from the results?  About 1 out of 4 LDCT exams will find something that may need more tests. Most of the time, these findings are lung nodules. Lung nodules are very small collections of tissue in the lung. These nodules are very common, and the vast majority--more than 97 percent--are not cancer (benign). Most are normal lymph nodes or small areas of scarring from past infections.  But, if a small lung nodule is found to be cancer, the cancer can be cured more than 90 percent of the time. To know if the nodule is cancer, we may need to get more images before your next yearly screening exam. If the nodule has suspicious features (for example, it is large, has an odd shape or grows over time), we will refer you to a specialist for further testing.  Will my doctor also get the results?  Yes. Your doctor will get a copy of your results.  Is it okay to keep smoking now that there s a cancer screening exam?  No. Tobacco is one of the strongest cancer-causing agents. It causes not only lung cancer, but other cancers and cardiovascular (heart) diseases as well. The damage caused by smoking builds over time. This means that the longer you smoke, the higher your risk of disease. While it is never too late to quit, the sooner you quit, the better.  Where can I find help to quit smoking?  The  best way to prevent lung cancer is to stop smoking. If you have already quit smoking, congratulations and keep it up! For help on quitting smoking, please call [facility] at 2-788-QUITNOW (1-138.383.9508) or the American [employer] Society at 1-652.720.7266 to find local resources near you.  One-on-one health coaching:  If you d prefer to work individually with a health care provider on tobacco cessation, we offer:      Medication Therapy Management:  Our specially trained pharmacists work closely with you and your doctor to help you quit smoking.  Call [phone] or [phone] (toll free).

## 2023-11-09 RX ORDER — LEVOTHYROXINE SODIUM 175 UG/1
175 TABLET ORAL DAILY
Qty: 90 TABLET | Refills: 0 | Status: SHIPPED | OUTPATIENT
Start: 2023-11-09 | End: 2024-02-19

## 2023-11-09 NOTE — RESULT ENCOUNTER NOTE
Carol Ann,     Your thyroid is over replaced. You are getting too much thyroid hormone which would explain some of your symptoms. I would like you to skip your thyroid pills today and tomorrow and then on Saturday start with a new dose of 175mcg every day. We will have you schedule a lab only appointment in 4-6 weeks to recheck your levels.   Anastasiya Turner PA-C

## 2023-12-17 DIAGNOSIS — J45.20 INTERMITTENT ASTHMA, UNCOMPLICATED: ICD-10-CM

## 2023-12-18 RX ORDER — ALBUTEROL SULFATE 90 UG/1
AEROSOL, METERED RESPIRATORY (INHALATION)
Qty: 18 G | Refills: 1 | Status: SHIPPED | OUTPATIENT
Start: 2023-12-18 | End: 2024-05-06

## 2024-01-04 ENCOUNTER — ANCILLARY PROCEDURE (OUTPATIENT)
Dept: CT IMAGING | Facility: CLINIC | Age: 54
End: 2024-01-04
Attending: PHYSICIAN ASSISTANT
Payer: COMMERCIAL

## 2024-01-04 DIAGNOSIS — Z87.891 PERSONAL HISTORY OF TOBACCO USE: ICD-10-CM

## 2024-01-04 PROCEDURE — 71271 CT THORAX LUNG CANCER SCR C-: CPT | Mod: TC | Performed by: RADIOLOGY

## 2024-01-04 NOTE — RESULT ENCOUNTER NOTE
Carol Ann,     Your lung cancer screening CT showed benign issues. We will continue to order this yearly.   Anastasiya Turner PA-C

## 2024-02-16 ENCOUNTER — LAB (OUTPATIENT)
Dept: LAB | Facility: CLINIC | Age: 54
End: 2024-02-16
Payer: COMMERCIAL

## 2024-02-16 DIAGNOSIS — E03.9 HYPOTHYROIDISM, UNSPECIFIED TYPE: ICD-10-CM

## 2024-02-16 LAB
T4 FREE SERPL-MCNC: 1.95 NG/DL (ref 0.9–1.7)
TSH SERPL DL<=0.005 MIU/L-ACNC: <0.01 UIU/ML (ref 0.3–4.2)

## 2024-02-16 PROCEDURE — 36415 COLL VENOUS BLD VENIPUNCTURE: CPT

## 2024-02-16 PROCEDURE — 84443 ASSAY THYROID STIM HORMONE: CPT

## 2024-02-16 PROCEDURE — 84439 ASSAY OF FREE THYROXINE: CPT

## 2024-02-19 DIAGNOSIS — E03.9 HYPOTHYROIDISM, UNSPECIFIED TYPE: ICD-10-CM

## 2024-02-19 RX ORDER — LEVOTHYROXINE SODIUM 150 UG/1
150 TABLET ORAL DAILY
Qty: 90 TABLET | Refills: 0 | Status: SHIPPED | OUTPATIENT
Start: 2024-02-19 | End: 2024-07-05

## 2024-02-19 NOTE — RESULT ENCOUNTER NOTE
Carol Ann,     Your thyroid levels are still too high. I would like to decrease your dose to the 150mcg daily and recheck labs in 4-6 weeks. Please schedule this as a lab only appointment.   Anastasiya Turner PA-C

## 2024-05-03 DIAGNOSIS — J45.20 INTERMITTENT ASTHMA, UNCOMPLICATED: ICD-10-CM

## 2024-05-06 RX ORDER — ALBUTEROL SULFATE 90 UG/1
AEROSOL, METERED RESPIRATORY (INHALATION)
Qty: 18 G | Refills: 0 | Status: SHIPPED | OUTPATIENT
Start: 2024-05-06 | End: 2024-08-21

## 2024-07-03 DIAGNOSIS — E03.9 HYPOTHYROIDISM, UNSPECIFIED TYPE: ICD-10-CM

## 2024-07-05 RX ORDER — LEVOTHYROXINE SODIUM 150 UG/1
150 TABLET ORAL DAILY
Qty: 90 TABLET | Refills: 0 | Status: SHIPPED | OUTPATIENT
Start: 2024-07-05

## 2024-08-20 DIAGNOSIS — J45.20 INTERMITTENT ASTHMA, UNCOMPLICATED: ICD-10-CM

## 2024-08-21 RX ORDER — ALBUTEROL SULFATE 90 UG/1
AEROSOL, METERED RESPIRATORY (INHALATION)
Qty: 18 G | Refills: 0 | Status: SHIPPED | OUTPATIENT
Start: 2024-08-21

## 2024-08-27 ENCOUNTER — PATIENT SELF-TRIAGE (OUTPATIENT)
Dept: URGENT CARE | Facility: CLINIC | Age: 54
End: 2024-08-27
Payer: COMMERCIAL

## 2024-08-27 DIAGNOSIS — Z12.11 SPECIAL SCREENING FOR MALIGNANT NEOPLASMS, COLON: ICD-10-CM

## 2024-08-28 ENCOUNTER — PATIENT OUTREACH (OUTPATIENT)
Dept: GASTROENTEROLOGY | Facility: CLINIC | Age: 54
End: 2024-08-28
Payer: COMMERCIAL

## 2024-08-28 DIAGNOSIS — Z12.11 SPECIAL SCREENING FOR MALIGNANT NEOPLASMS, COLON: Primary | ICD-10-CM

## 2024-08-28 NOTE — PROGRESS NOTES
"Carol Ann responded to self-triage colonoscopy referral. Does not appear to have had one done and is 53 years old. No abdominal symptoms, has claustrophobia; will order with MAC anesthesia per protocol.    [facility] Screening Colonoscopy Referral Review    Patient meets the inclusion criteria for screening colonoscopy standing order.    Ordering/Referring Provider:  Anastasiya Turner      BMI: Estimated body mass index is 27.79 kg/m  as calculated from the following:    Height as of 11/8/23: 1.651 m (5' 5\").    Weight as of 11/8/23: 75.8 kg (167 lb).     Sedation:  Does patient have any of the following conditions affecting sedation?  Hx of severe PTSD, anxiety, or psychosis: MAC sedation recommended    Previous Scopes:  Any previous recommendations or follow up needs based on previous scope?  na / No recommendations.    Medical Concerns to Postpone Order:  Does patient have any of the following medical concerns that should postpone/delay colonoscopy referral?  No medical conditions affecting colonoscopy referral.    Final Referral Details:  Based on patient's medical history patient is appropriate for referral order with MAC/deep sedation.   BMI<= 45 45 < BMI <= 48 48 < BMI < = 50  BMI > 50   No Restrictions No [facility]  [facility]  [facility] with exceptions Hospital Only OR Only     " Patient need a refill on uribell she can't get an appointment until next week.  Her appointment if on 07/12/2018.  She really need the medication.  Please advise.

## 2024-09-04 ENCOUNTER — TELEPHONE (OUTPATIENT)
Dept: GASTROENTEROLOGY | Facility: CLINIC | Age: 54
End: 2024-09-04
Payer: COMMERCIAL

## 2024-09-04 NOTE — TELEPHONE ENCOUNTER
"Pre Assessment RN Review    Order was placed for MAC due to claustrophobia. Patient stated she would be fine with CS and would like CS as the claustrophobia is only an issue when she is \"in really tight spaces\".     I informed  ok to go ahead with CS.    Scheduling Status & Recommendations    Sedation: Moderate Sedation - Per RN assessment.    Tiara Mora, RN Colorectal Cancer   Division of Gastroenterology at [facility]      "

## 2024-09-04 NOTE — TELEPHONE ENCOUNTER
"Endoscopy Scheduling Screen    Have you had a positive Covid test in the last 14 days?  No    What is your communication preference for Instructions and/or Bowel Prep?   MyChart    What insurance is in the chart?  Other:  r    Ordering/Referring Provider:     ERIN MONTES DE OCA      (If ordering provider performs procedure, schedule with ordering provider unless otherwise instructed. )    BMI: Estimated body mass index is 27.79 kg/m  as calculated from the following:    Height as of 11/8/23: 1.651 m (5' 5\").    Weight as of 11/8/23: 75.8 kg (167 lb).     Sedation Ordered  MAC/deep sedation.   BMI<= 45 45 < BMI <= 48 48 < BMI < = 50  BMI > 50   No Restrictions No [facility]  No [facility]  [facility] with exceptions Hospital Only OR Only       Do you have a history of malignant hyperthermia?  No    (Females) Are you currently pregnant?   No     Have you been diagnosed or told you have pulmonary hypertension?   No    Do you have an LVAD?  No    Have you been told you have moderate to severe sleep apnea?  No    Have you been told you have COPD, asthma, or any other lung disease?  Yes     What breathing problems do you have?  Asthma     Do you use home oxygen?  No    Have your breathing problems required an ED visit or hospitalization in the last year?  No    Do you have any heart conditions?  No     Have you ever had or are you waiting for an organ transplant?  No. Continue scheduling, no site restrictions.    Have you had a stroke or transient ischemic attack (TIA aka \"mini stroke\" in the last 6 months?   No    Have you been diagnosed with or been told you have cirrhosis of the liver?   No    Are you currently on dialysis?   No    Do you need assistance transferring?   No    BMI: Estimated body mass index is 27.79 kg/m  as calculated from the following:    Height as of 11/8/23: 1.651 m (5' 5\").    Weight as of 11/8/23: 75.8 kg (167 lb).     Is patients BMI > 40 and scheduling location [facility]?  No    Do you take an injectable " medication for weight loss or diabetes (excluding insulin)?  No    Do you take the medication Naltrexone?  No    Do you take blood thinners?  No       Prep   Are you currently on dialysis or do you have chronic kidney disease?  No    Do you have a diagnosis of diabetes?  No    Do you have a diagnosis of cystic fibrosis (CF)?  No    On a regular basis do you go 3 -5 days between bowel movements?  No    BMI > 40?  No    Preferred Pharmacy:        [id number] - [place] - [address]  [address]  [place] [address]  Phone: [phone] Fax: [phone]      Final Scheduling Details     Procedure scheduled  Colonoscopy    Surgeon:  Sylvain     Date of procedure:  10/11     Pre-OP / PAC:   No - Not required for this site.    Location  [facility] - Per order.    Sedation   Moderate Sedation - Per RN assessment. Okay per nurses to have her do with Moderate sedation instead of mac as ordered      Patient Reminders:   You will receive a call from a Nurse to review instructions and health history.  This assessment must be completed prior to your procedure.  Failure to complete the Nurse assessment may result in the procedure being cancelled.      On the day of your procedure, please designate an adult(s) who can drive you home stay with you for the next 24 hours. The medicines used in the exam will make you sleepy. You will not be able to drive.      You cannot take public transportation, ride share services, or non-medical taxi service without a responsible caregiver.  Medical transport services are allowed with the requirement that a responsible caregiver will receive you at your destination.  We require that drivers and caregivers are confirmed prior to your procedure.

## 2024-09-19 ASSESSMENT — ASTHMA QUESTIONNAIRES
QUESTION_5 LAST FOUR WEEKS HOW WOULD YOU RATE YOUR ASTHMA CONTROL: WELL CONTROLLED
ACT_TOTALSCORE: 23
ACT_TOTALSCORE: 23
QUESTION_2 LAST FOUR WEEKS HOW OFTEN HAVE YOU HAD SHORTNESS OF BREATH: NOT AT ALL
QUESTION_1 LAST FOUR WEEKS HOW MUCH OF THE TIME DID YOUR ASTHMA KEEP YOU FROM GETTING AS MUCH DONE AT WORK, SCHOOL OR AT HOME: NONE OF THE TIME
QUESTION_4 LAST FOUR WEEKS HOW OFTEN HAVE YOU USED YOUR RESCUE INHALER OR NEBULIZER MEDICATION (SUCH AS ALBUTEROL): ONCE A WEEK OR LESS
QUESTION_3 LAST FOUR WEEKS HOW OFTEN DID YOUR ASTHMA SYMPTOMS (WHEEZING, COUGHING, SHORTNESS OF BREATH, CHEST TIGHTNESS OR PAIN) WAKE YOU UP AT NIGHT OR EARLIER THAN USUAL IN THE MORNING: NOT AT ALL

## 2024-09-20 ENCOUNTER — OFFICE VISIT (OUTPATIENT)
Dept: FAMILY MEDICINE | Facility: CLINIC | Age: 54
End: 2024-09-20
Payer: COMMERCIAL

## 2024-09-20 VITALS
HEART RATE: 82 BPM | SYSTOLIC BLOOD PRESSURE: 105 MMHG | OXYGEN SATURATION: 98 % | BODY MASS INDEX: 27.66 KG/M2 | HEIGHT: 65 IN | TEMPERATURE: 98.3 F | DIASTOLIC BLOOD PRESSURE: 58 MMHG | WEIGHT: 166 LBS | RESPIRATION RATE: 19 BRPM

## 2024-09-20 DIAGNOSIS — F17.200 TOBACCO USE DISORDER: ICD-10-CM

## 2024-09-20 DIAGNOSIS — E03.9 HYPOTHYROIDISM, UNSPECIFIED TYPE: Primary | ICD-10-CM

## 2024-09-20 DIAGNOSIS — L98.9 SCALP LESION: ICD-10-CM

## 2024-09-20 DIAGNOSIS — R42 DIZZINESS: ICD-10-CM

## 2024-09-20 DIAGNOSIS — R79.89 ABNORMAL SERUM THYROXINE (T4) LEVEL: ICD-10-CM

## 2024-09-20 DIAGNOSIS — Z13.220 SCREENING FOR HYPERLIPIDEMIA: ICD-10-CM

## 2024-09-20 DIAGNOSIS — R11.0 NAUSEA: ICD-10-CM

## 2024-09-20 DIAGNOSIS — G44.219 EPISODIC TENSION-TYPE HEADACHE, NOT INTRACTABLE: ICD-10-CM

## 2024-09-20 LAB
ALBUMIN SERPL BCG-MCNC: 4.3 G/DL (ref 3.5–5.2)
ALP SERPL-CCNC: 55 U/L (ref 40–150)
ALT SERPL W P-5'-P-CCNC: 9 U/L (ref 0–50)
ANION GAP SERPL CALCULATED.3IONS-SCNC: 11 MMOL/L (ref 7–15)
AST SERPL W P-5'-P-CCNC: 15 U/L (ref 0–45)
BASOPHILS # BLD AUTO: 0 10E3/UL (ref 0–0.2)
BASOPHILS NFR BLD AUTO: 0 %
BILIRUB SERPL-MCNC: 0.4 MG/DL
BUN SERPL-MCNC: 14.9 MG/DL (ref 6–20)
CALCIUM SERPL-MCNC: 9.4 MG/DL (ref 8.8–10.4)
CHLORIDE SERPL-SCNC: 105 MMOL/L (ref 98–107)
CHOLEST SERPL-MCNC: 214 MG/DL
CREAT SERPL-MCNC: 0.91 MG/DL (ref 0.51–0.95)
CRP SERPL-MCNC: <3 MG/L
EGFRCR SERPLBLD CKD-EPI 2021: 76 ML/MIN/1.73M2
EOSINOPHIL # BLD AUTO: 0.2 10E3/UL (ref 0–0.7)
EOSINOPHIL NFR BLD AUTO: 4 %
ERYTHROCYTE [DISTWIDTH] IN BLOOD BY AUTOMATED COUNT: 13.3 % (ref 10–15)
ERYTHROCYTE [SEDIMENTATION RATE] IN BLOOD BY WESTERGREN METHOD: 4 MM/HR (ref 0–30)
EST. AVERAGE GLUCOSE BLD GHB EST-MCNC: 103 MG/DL
FASTING STATUS PATIENT QL REPORTED: YES
FASTING STATUS PATIENT QL REPORTED: YES
FERRITIN SERPL-MCNC: 78 NG/ML (ref 11–328)
GLUCOSE SERPL-MCNC: 99 MG/DL (ref 70–99)
HBA1C MFR BLD: 5.2 % (ref 0–5.6)
HCO3 SERPL-SCNC: 25 MMOL/L (ref 22–29)
HCT VFR BLD AUTO: 48 % (ref 35–47)
HDLC SERPL-MCNC: 60 MG/DL
HGB BLD-MCNC: 16.1 G/DL (ref 11.7–15.7)
IMM GRANULOCYTES # BLD: 0 10E3/UL
IMM GRANULOCYTES NFR BLD: 0 %
IRON BINDING CAPACITY (ROCHE): 318 UG/DL (ref 240–430)
IRON SATN MFR SERPL: 19 % (ref 15–46)
IRON SERPL-MCNC: 60 UG/DL (ref 37–145)
LDLC SERPL CALC-MCNC: 144 MG/DL
LYMPHOCYTES # BLD AUTO: 1.8 10E3/UL (ref 0.8–5.3)
LYMPHOCYTES NFR BLD AUTO: 32 %
MCH RBC QN AUTO: 30.4 PG (ref 26.5–33)
MCHC RBC AUTO-ENTMCNC: 33.5 G/DL (ref 31.5–36.5)
MCV RBC AUTO: 91 FL (ref 78–100)
MONOCYTES # BLD AUTO: 0.3 10E3/UL (ref 0–1.3)
MONOCYTES NFR BLD AUTO: 5 %
NEUTROPHILS # BLD AUTO: 3.3 10E3/UL (ref 1.6–8.3)
NEUTROPHILS NFR BLD AUTO: 59 %
NONHDLC SERPL-MCNC: 154 MG/DL
PLATELET # BLD AUTO: 301 10E3/UL (ref 150–450)
POTASSIUM SERPL-SCNC: 4.5 MMOL/L (ref 3.4–5.3)
PROT SERPL-MCNC: 6.9 G/DL (ref 6.4–8.3)
RBC # BLD AUTO: 5.3 10E6/UL (ref 3.8–5.2)
SODIUM SERPL-SCNC: 141 MMOL/L (ref 135–145)
T4 FREE SERPL-MCNC: 2.46 NG/DL (ref 0.9–1.7)
TRIGL SERPL-MCNC: 52 MG/DL
TSH SERPL DL<=0.005 MIU/L-ACNC: 0.03 UIU/ML (ref 0.3–4.2)
VIT B12 SERPL-MCNC: 348 PG/ML (ref 232–1245)
WBC # BLD AUTO: 5.6 10E3/UL (ref 4–11)

## 2024-09-20 PROCEDURE — 85652 RBC SED RATE AUTOMATED: CPT | Performed by: PHYSICIAN ASSISTANT

## 2024-09-20 PROCEDURE — 99214 OFFICE O/P EST MOD 30 MIN: CPT | Performed by: PHYSICIAN ASSISTANT

## 2024-09-20 PROCEDURE — 83550 IRON BINDING TEST: CPT | Performed by: PHYSICIAN ASSISTANT

## 2024-09-20 PROCEDURE — 85025 COMPLETE CBC W/AUTO DIFF WBC: CPT | Performed by: PHYSICIAN ASSISTANT

## 2024-09-20 PROCEDURE — 83540 ASSAY OF IRON: CPT | Performed by: PHYSICIAN ASSISTANT

## 2024-09-20 PROCEDURE — G2211 COMPLEX E/M VISIT ADD ON: HCPCS | Performed by: PHYSICIAN ASSISTANT

## 2024-09-20 PROCEDURE — 80061 LIPID PANEL: CPT | Performed by: PHYSICIAN ASSISTANT

## 2024-09-20 PROCEDURE — 36415 COLL VENOUS BLD VENIPUNCTURE: CPT | Performed by: PHYSICIAN ASSISTANT

## 2024-09-20 PROCEDURE — 83036 HEMOGLOBIN GLYCOSYLATED A1C: CPT | Performed by: PHYSICIAN ASSISTANT

## 2024-09-20 PROCEDURE — 84443 ASSAY THYROID STIM HORMONE: CPT | Performed by: PHYSICIAN ASSISTANT

## 2024-09-20 PROCEDURE — 84439 ASSAY OF FREE THYROXINE: CPT | Performed by: PHYSICIAN ASSISTANT

## 2024-09-20 PROCEDURE — 82728 ASSAY OF FERRITIN: CPT | Performed by: PHYSICIAN ASSISTANT

## 2024-09-20 PROCEDURE — 86140 C-REACTIVE PROTEIN: CPT | Performed by: PHYSICIAN ASSISTANT

## 2024-09-20 PROCEDURE — 82607 VITAMIN B-12: CPT | Performed by: PHYSICIAN ASSISTANT

## 2024-09-20 PROCEDURE — 80053 COMPREHEN METABOLIC PANEL: CPT | Performed by: PHYSICIAN ASSISTANT

## 2024-09-20 NOTE — PROGRESS NOTES
"  Assessment & Plan     Hypothyroidism, unspecified type  Will recheck dose was changed earlier this year.   - TSH WITH FREE T4 REFLEX; Future  - TSH WITH FREE T4 REFLEX    Tobacco use disorder  Patient is a smoking, contributes to underlying  risks.     Dizziness  Uncertain etiology, start with labs. Concern this could be migraine vs TIA vs other.   - CBC with Platelets & Differential; Future  - Hemoglobin A1c; Future  - Vitamin B12; Future  - Iron & Iron Binding Capacity; Future  - Ferritin; Future  - CBC with Platelets & Differential  - Hemoglobin A1c  - Vitamin B12  - Iron & Iron Binding Capacity  - Ferritin    Nausea  Has never had EGD. H pylori testing. Then start omeprazole daily, consider EGD.   - Hemoglobin A1c; Future  - Helicobacter pylori Antigen Stool; Future  - Hemoglobin A1c  - Helicobacter pylori Antigen Stool    Episodic tension-type headache, not intractable  Uncertain etiology. Labs.   - Comprehensive metabolic panel; Future  - Erythrocyte sedimentation rate auto; Future  - CRP inflammation; Future  - Comprehensive metabolic panel  - Erythrocyte sedimentation rate auto  - CRP inflammation    Screening for hyperlipidemia  - Lipid panel reflex to direct LDL Non-fasting; Future  - Lipid panel reflex to direct LDL Non-fasting    Scalp lesion  Likely cyst, will monitor for now.     The longitudinal plan of care for the diagnosis(es)/condition(s) as documented were addressed during this visit. Due to the added complexity in care, I will continue to support Carol Ann in the subsequent management and with ongoing continuity of care.      BMI  Estimated body mass index is 27.62 kg/m  as calculated from the following:    Height as of this encounter: 1.651 m (5' 5\").    Weight as of this encounter: 75.3 kg (166 lb).             Toshia Maharaj is a 53 year old, presenting for the following health issues:  Patient Inquiry and Headache        9/20/2024     6:44 AM   Additional Questions   Roomed by lorraine valentine" "    History of Present Illness       Headaches:   Since the patient's last clinic visit, headaches are: worsened  The patient is getting headaches:  3-4 times a week  She is able to do normal daily activities when she has a migraine.  The patient is taking the following rescue/relief medications:  Ibuprofen (Advil, Motrin) and Tylenol   Patient states \"I get some relief\" from the rescue/relief medications.   The patient is taking the following medications to prevent migraines:  No medications to prevent migraines  In the past 4 weeks, the patient has gone to an Urgent Care or Emergency Room 0 times times due to headaches.    Reason for visit:  Several things.  My kvng has a lump on it.  I am nauseated every morning and after i eat.  I get headaches, dizziness frequently.  Symptom onset:  More than a month  Symptoms include:  See sbove  Symptom intensity:  Moderate  Symptom progression:  Staying the same  Had these symptoms before:  Yes  Has tried/received treatment for these symptoms:  Yes  Previous treatment was successful:  No  What makes it worse:  Eating  What makes it better:  No   She is taking medications regularly.       Wakes up with a headache and nausea.   Nausea lasts for a hour and then forces herself to eat and does feel better.   History of cholecystectomy.   A lot of diarrhea after she eats.   Only using the omeprazole when she gets chest pain for a week or so.   Hasn't tried for the nausea.     Not using NSAIDS regularly.   No change in alcohol use.   Marijuana prn. Edible.   Smoking cigaretttes less than 1ppd.     Headaches, nausea is with it sometimes- can see spots at times-floaters.   Can be a blurry spot or a black spot. No eye doctor recently.   No confusion.   Dizziness is off balance- can happen with her hot flashes from menopause- 1-2 times per week.   2 years since last menses. The hot flashes are keeping her awake at night.   Weight gain, some unintentional.     Bump showed up about 1.5 " "months ago. A roll and 2 edges to it.   No pain and feels soft.                       Objective    /58 (BP Location: Left arm, Patient Position: Chair, Cuff Size: Adult Large)   Pulse 82   Temp 98.3  F (36.8  C) (Temporal)   Resp 19   Ht 1.651 m (5' 5\")   Wt 75.3 kg (166 lb)   LMP 09/30/2020 (Exact Date)   SpO2 98%   BMI 27.62 kg/m    Body mass index is 27.62 kg/m .  Physical Exam   GENERAL: alert and no distress  EYES: Eyes grossly normal to inspection, PERRL and conjunctivae and sclerae normal  HENT: ear canals and TM's normal, nose and mouth without ulcers or lesions- scalp with a small area of fluctuance in the mid frontal scalp.  NECK: no adenopathy, no asymmetry, masses, or scars  RESP: lungs clear to auscultation - no rales, rhonchi or wheezes  CV: regular rate and rhythm, normal S1 S2, no S3 or S4, no murmur, click or rub, no peripheral edema   ABDOMEN: soft, nontender, no hepatosplenomegaly, no masses   MS: no gross musculoskeletal defects noted, no edema  NEURO: Normal strength and tone, mentation intact and speech normal, CN II-XII intact. Deep tendon reflexes intact.   PSYCH: mentation appears normal, affect normal/bright            Signed Electronically by: Anastasiya Turner PA-C    "

## 2024-09-23 NOTE — RESULT ENCOUNTER NOTE
Carol Ann,     Your labs show that your T4 is very high. This is likely causing some of your symptoms. I would like to do 2 different things to investigate this more. The first is a thyroid ultrasound. They should call you to schedule this. The next is to lower your Levothyroxine dosage. To do that we are only going to have your take the levothyroxine Monday-Saturday. You are going to skip your Sunday dosages. I would like you plan to have a lab only appointment in 4 weeks. We need to monitor this closely. Anastasiya Turner PA-C

## 2024-10-04 ENCOUNTER — ANCILLARY PROCEDURE (OUTPATIENT)
Dept: ULTRASOUND IMAGING | Facility: CLINIC | Age: 54
End: 2024-10-04
Attending: PHYSICIAN ASSISTANT
Payer: COMMERCIAL

## 2024-10-04 ENCOUNTER — LAB (OUTPATIENT)
Dept: LAB | Facility: CLINIC | Age: 54
End: 2024-10-04
Payer: COMMERCIAL

## 2024-10-04 DIAGNOSIS — E03.9 HYPOTHYROIDISM, UNSPECIFIED TYPE: ICD-10-CM

## 2024-10-04 DIAGNOSIS — R79.89 ABNORMAL SERUM THYROXINE (T4) LEVEL: ICD-10-CM

## 2024-10-04 PROCEDURE — 87338 HPYLORI STOOL AG IA: CPT | Performed by: PHYSICIAN ASSISTANT

## 2024-10-04 PROCEDURE — 76536 US EXAM OF HEAD AND NECK: CPT | Mod: TC | Performed by: RADIOLOGY

## 2024-10-07 ENCOUNTER — TELEPHONE (OUTPATIENT)
Dept: FAMILY MEDICINE | Facility: CLINIC | Age: 54
End: 2024-10-07
Payer: COMMERCIAL

## 2024-10-07 LAB — H PYLORI AG STL QL IA: NEGATIVE

## 2024-10-07 NOTE — LETTER
October 7, 2024    To  Carol Ann Anguiano  [address] [place] [address]    Your team at [facility] cares about your health. We have reviewed your chart and based on our findings; we are making the following recommendations to better manage your health.     You are in particular need of attention regarding the following:     Call or MyChart message your clinic to schedule a colonoscopy, schedule/ a FIT Test, or order a Cologuard test. If you are unsure what type of test you need, please call your clinic and speak to clinic staff.   Colon cancer is now the second leading cause of cancer-related deaths in the [facility] for both men and women and there are over 130,000 new cases and 50,000 deaths per year from colon cancer. Colonoscopies can prevent 90-95% of these deaths. Problem lesions can be removed before they ever become cancer. This test is not only looking for cancer, but also getting rid of precancerous lesions.   If you are under/uninsured, we recommend you contact the [employer] Program.[employer] is a free colorectal cancer screening program that provides colonoscopies for eligible under/uninsured [place] men and women. If you are interested in receiving a free colonoscopy, please call [employer] at t [phone] (mention code [employer]) to see if you're eligible. Please have them send us the results.   Schedule Annual MAMMOGRAPHY. [facility] scheduling number is [phone] or schedule in [employer] (self referral).  1 in 8 women will develop invasive breast cancer during her lifetime and it is the most common non-skin cancer in American Women. EARLY detection, new treatments, and a better understanding of the disease have increased survival rates- the 5 year survival rate in the 1960's was 63% and today it is close to 90%.    If you have already completed these items, please contact the clinic via phone or   [employer] so your care team can review and update your records. Thank  Patient c/o nausea upon bedside rounds. IV Zofran given and had patient chew piece of gum. you for   choosing [facility] for your healthcare needs. For any questions,   concerns, or to schedule an appointment please contact our clinic.    Healthy Regards,      Your [facility] Care Team

## 2024-10-07 NOTE — TELEPHONE ENCOUNTER
Patient Quality Outreach    Patient is due for the following:   Asthma  -  AAP  Colon Cancer Screening  Breast Cancer Screening - Mammogram      Topic Date Due    Pneumococcal Vaccine (1 of 2 - PCV) Never done    Hepatitis B Vaccine (1 of 3 - 19+ 3-dose series) Never done    Diptheria Tetanus Pertussis (DTAP/TDAP/TD) Vaccine (1 - Tdap) Never done    Zoster (Shingles) Vaccine (2 of 2) 06/02/2022    Flu Vaccine (1) 09/01/2024    COVID-19 Vaccine (4 - 2024-25 season) 09/01/2024       Next Steps:   Schedule a nurse only visit for mammogram    Type of outreach:    Sent [employer] message.      Questions for provider review:    None           Elvia Gracia CMA  Chart routed to Care Team.

## 2024-10-07 NOTE — RESULT ENCOUNTER NOTE
Carol Ann,     No abnormalities with your thyroid. We will just need to keep adjusting your thyroid medications. Please make sure to come for a lab only appointment in 4 weeks as planned.   Anastasiya Turner PA-C

## 2024-10-09 ENCOUNTER — TELEPHONE (OUTPATIENT)
Dept: GASTROENTEROLOGY | Facility: CLINIC | Age: 54
End: 2024-10-09
Payer: COMMERCIAL

## 2024-10-09 NOTE — TELEPHONE ENCOUNTER
ATTEMPT TO INFORM PT OF UPDATED ARRIVAL TIME ON 10/11/24 AT . DR HERNANDEZ NOT SCOPING.    THE PATIENT'S NEW ARRIVAL TIME IS 10:30 AM.    VO VMB, NO ANSWER.     MYCHART MSSG SENT.

## 2024-10-10 RX ORDER — SIMETHICONE 40MG/0.6ML
133 SUSPENSION, DROPS(FINAL DOSAGE FORM)(ML) ORAL
Status: DISCONTINUED | OUTPATIENT
Start: 2024-10-10 | End: 2024-10-12 | Stop reason: HOSPADM

## 2024-10-10 RX ORDER — NALOXONE HYDROCHLORIDE 0.4 MG/ML
0.4 INJECTION, SOLUTION INTRAMUSCULAR; INTRAVENOUS; SUBCUTANEOUS
Status: DISCONTINUED | OUTPATIENT
Start: 2024-10-10 | End: 2024-10-12 | Stop reason: HOSPADM

## 2024-10-10 RX ORDER — EPINEPHRINE 1 MG/ML
0.1 INJECTION, SOLUTION INTRAMUSCULAR; SUBCUTANEOUS
Status: DISCONTINUED | OUTPATIENT
Start: 2024-10-10 | End: 2024-10-12 | Stop reason: HOSPADM

## 2024-10-10 RX ORDER — ATROPINE SULFATE 0.1 MG/ML
1 INJECTION INTRAVENOUS
Status: DISCONTINUED | OUTPATIENT
Start: 2024-10-10 | End: 2024-10-12 | Stop reason: HOSPADM

## 2024-10-10 RX ORDER — FLUMAZENIL 0.1 MG/ML
0.2 INJECTION, SOLUTION INTRAVENOUS
Status: DISCONTINUED | OUTPATIENT
Start: 2024-10-10 | End: 2024-10-12 | Stop reason: HOSPADM

## 2024-10-10 RX ORDER — NALOXONE HYDROCHLORIDE 0.4 MG/ML
0.2 INJECTION, SOLUTION INTRAMUSCULAR; INTRAVENOUS; SUBCUTANEOUS
Status: DISCONTINUED | OUTPATIENT
Start: 2024-10-10 | End: 2024-10-12 | Stop reason: HOSPADM

## 2024-10-10 RX ORDER — FENTANYL CITRATE 50 UG/ML
50-100 INJECTION, SOLUTION INTRAMUSCULAR; INTRAVENOUS EVERY 5 MIN PRN
Status: DISCONTINUED | OUTPATIENT
Start: 2024-10-10 | End: 2024-10-12 | Stop reason: HOSPADM

## 2024-10-10 RX ORDER — DIPHENHYDRAMINE HYDROCHLORIDE 50 MG/ML
25-50 INJECTION INTRAMUSCULAR; INTRAVENOUS
Status: COMPLETED | OUTPATIENT
Start: 2024-10-10 | End: 2024-10-11

## 2024-10-11 ENCOUNTER — HOSPITAL ENCOUNTER (OUTPATIENT)
Facility: AMBULATORY SURGERY CENTER | Age: 54
Discharge: HOME | End: 2024-10-11
Attending: STUDENT IN AN ORGANIZED HEALTH CARE EDUCATION/TRAINING PROGRAM | Admitting: STUDENT IN AN ORGANIZED HEALTH CARE EDUCATION/TRAINING PROGRAM
Payer: COMMERCIAL

## 2024-10-11 VITALS
TEMPERATURE: 97 F | OXYGEN SATURATION: 99 % | RESPIRATION RATE: 16 BRPM | SYSTOLIC BLOOD PRESSURE: 128 MMHG | HEART RATE: 63 BPM | DIASTOLIC BLOOD PRESSURE: 88 MMHG

## 2024-10-11 LAB — COLONOSCOPY: NORMAL

## 2024-10-11 PROCEDURE — G8918 PT W/O PREOP ORDER IV AB PRO: HCPCS

## 2024-10-11 PROCEDURE — 45380 COLONOSCOPY AND BIOPSY: CPT

## 2024-10-11 PROCEDURE — 88305 TISSUE EXAM BY PATHOLOGIST: CPT | Mod: GC | Performed by: STUDENT IN AN ORGANIZED HEALTH CARE EDUCATION/TRAINING PROGRAM

## 2024-10-11 PROCEDURE — G8907 PT DOC NO EVENTS ON DISCHARG: HCPCS

## 2024-10-11 RX ORDER — LIDOCAINE 40 MG/G
CREAM TOPICAL
Status: DISCONTINUED | OUTPATIENT
Start: 2024-10-11 | End: 2024-10-12 | Stop reason: HOSPADM

## 2024-10-11 RX ORDER — FLUMAZENIL 0.1 MG/ML
0.2 INJECTION, SOLUTION INTRAVENOUS
Status: DISCONTINUED | OUTPATIENT
Start: 2024-10-11 | End: 2024-10-12 | Stop reason: HOSPADM

## 2024-10-11 NOTE — H&P
Pre-Endoscopy History and Physical     Carol Ann Anguiano MRN# [id number]   YOB: 1970 Age: 54 year old     Date of Procedure: 10/11/24  Primary care provider: Anastasiya Turner  Type of Endoscopy: Colonoscopy  Reason for Procedure: Screening  Type of Anesthesia Anticipated: Moderate Sedation    HPI:    Carol Ann is a 54 year old female who will be undergoing the above procedure.      Prior colonoscopy: none    A history and physical has been performed, notable for none. The patient's medications and allergies have been reviewed. The risks and benefits of the procedure and the sedation options and risks were discussed with the patient.  All questions were answered and informed consent was obtained.      Complains of chronic diarrhea.    Symptoms:  Rectal bleeding: No  Irregular bowel habits: No  Abnormal weight loss: No  Changes in stool: No    She denies a personal or family history of anesthesia complications or bleeding disorders. Denies family history of CRC or IBD.    Allergies   Allergen Reactions    Amoxicillin-Pot Clavulanate      Rash    Chantix [Varenicline]      Stomach cramps  Stomach cramps     Eryc [Erythromycin]      dry heaves  nausea    Erythromycin      N/V      Allergy to Latex: NO   Allergy to tape: NO   Intolerances: NO     Current Outpatient Medications   Medication Sig Dispense Refill    albuterol (VENTOLIN HFA) 108 (90 Base) MCG/ACT inhaler INHALE 2 PUFFS INTO THE LUNGS EVERY 6 HOURS AS NEEDED FOR SHORTNESS OF BREATH OR DIFFICULT BREATHING OR WHEEZING 18 g 0    levothyroxine (SYNTHROID/LEVOTHROID) 150 MCG tablet TAKE 1 TABLET(150 MCG) BY MOUTH DAILY 90 tablet 0    meclizine (ANTIVERT) 25 MG tablet Take 1 tablet (25 mg) by mouth 3 times daily as needed for dizziness 20 tablet 1    omeprazole (PRILOSEC) 40 MG DR capsule Take 1 capsule (40 mg) by mouth daily Take 30-60 minutes before a meal. 90 capsule 3    ondansetron (ZOFRAN ODT) 4 MG ODT tab Take 1 tablet (4 mg) by mouth every 8 hours as  needed for nausea 8 tablet 0    ammonium lactate (AMLACTIN) 12 % external cream Apply topically daily as needed for dry skin 385 g 11    Sulfacetamide Sodium-Sulfur 8-4 % SUSP USE SMALL AMOUNT ON ARMS AND FACE DAILY IN SHOWER. 1419 mL 3     Current Facility-Administered Medications   Medication Dose Route Frequency Provider Last Rate Last Admin    atropine injection 1 mg  1 mg Intravenous Once PRN Guillermo Narayan MD        benzocaine 20% (HURRICAINE/TOPEX) 20 % spray 0.5 mL  1 spray Mouth/Throat Once PRN Guillermo Narayan MD        diphenhydrAMINE (BENADRYL) injection 25-50 mg  25-50 mg Intravenous Once PRN Guillermo Narayan MD        EPINEPHrine (Anaphylaxis) (ADRENALIN) injection (vial) 0.1 mg  0.1 mg Submucosal Once PRN Guillermo Narayan MD        fentaNYL (PF) (SUBLIMAZE) injection  mcg   mcg Intravenous Q5 Min PRN Guillermo Narayan MD        flumazenil (ROMAZICON) injection 0.2 mg  0.2 mg Intravenous q1 min prn Guillermo Narayan MD        glucagon injection 0.5 mg  0.5 mg Intravenous Once PRN Guillermo Narayan MD        lidocaine (LMX4) kit   Topical Q1H PRN Guillermo Narayan MD        lidocaine 1 % 0.1-1 mL  0.1-1 mL Other Q1H PRN Guillermo Narayan MD        midazolam (VERSED) injection 0.5-2 mg  0.5-2 mg Intravenous Q4 Min PRN Guillermo Narayan MD        naloxone (NARCAN) injection 0.2 mg  0.2 mg Intravenous Q2 Min PRGuillermo Huang MD        Or    naloxone (NARCAN) injection 0.4 mg  0.4 mg Intravenous Q2 Min PRGuillermo Huang MD        Or    naloxone (NARCAN) injection 0.2 mg  0.2 mg Intramuscular Q2 Min PRGuillermo Huang MD        Or    naloxone (NARCAN) injection 0.4 mg  0.4 mg Intramuscular Q2 Min PRGuillermo Huang MD        simethicone (MYLICON) suspension 133 mg  133 mg Oral Once PRN Guillermo Narayan MD        sodium chloride (PF) 0.9% PF flush 3 mL  3 mL Intracatheter Q8H Guillerom Narayan MD        sodium  chloride (PF) 0.9% PF flush 3 mL  3 mL Intracatheter q1 min prn Guillermo Narayan MD        sodium chloride (PF) 0.9% PF flush 3 mL  3 mL Intravenous q1 min prn Guillermo Narayan MD        sodium chloride 0.9% BOLUS 500 mL  500 mL Intravenous Once PRN Guillermo Narayan MD           Patient Active Problem List   Diagnosis    Mild intermittent asthma    CARDIOVASCULAR SCREENING; LDL GOAL LESS THAN 160    Globus pharyngeus    Tobacco use disorder    Hypothyroidism, unspecified hypothyroidism type    Abnormal uterine bleeding    JONATHON I (cervical intraepithelial neoplasia I)    Nephrolithiasis    Low back pain        Past Medical History:   Diagnosis Date    Arthritis     great aunt    ASTHMA - MILD INTERMITTENT 2007    Cancer (H)     grandfather    Coronary artery disease     Grandfather and all 3 uncles have had heart attacks    Depressive disorder     myself and my mother    Family history of cancer     grandfather    Hypertension     Mother and grandmother    Unspecified hypothyroidism         Past Surgical History:   Procedure Laterality Date    ABDOMEN SURGERY  2017    hernia fixed    BIOPSY  2017    tested area of my cervix that was thought to be precancerous    CHOLECYSTECTOMY, LAPOROSCOPIC      Cholecystectomy, Laparoscopic    DILATION AND CURETTAGE, HYSTEROSCOPY DIAGNOSTIC, COMBINED  2017    abnormal uterine bleeding    HERNIA REPAIR  2017    LASIK BILATERAL         Social History     Tobacco Use    Smoking status: Every Day     Current packs/day: 0.00     Average packs/day: 1 pack/day for 25.0 years (25.0 ttl pk-yrs)     Types: Cigarettes     Start date: 4/3/1993     Last attempt to quit: 4/3/2018     Years since quittin.5     Passive exposure: Current    Smokeless tobacco: Never   Substance Use Topics    Alcohol use: Yes     Comment: occasionally       Family History   Problem Relation Age of Onset    Hypertension Mother     Depression Mother     Hyperlipidemia  "Mother     Diabetes Father         never met that man    Hypertension Maternal Grandmother     Cancer Paternal Grandfather         lungs/throat    Psychotic Disorder Son         adhd/ocd    Hypertension Son     Asthma Other     Thyroid Disease Other     Depression Other        REVIEW OF SYSTEMS:     5 point ROS negative except as noted above in HPI, including Gen., Resp., CV, GI &  system review.      PHYSICAL EXAM:   /81   Temp 97  F (36.1  C)   LMP 09/30/2020 (Exact Date)   SpO2 95%  Estimated body mass index is 27.62 kg/m  as calculated from the following:    Height as of 9/20/24: 1.651 m (5' 5\").    Weight as of 9/20/24: 75.3 kg (166 lb).   All Vitals have been reviewed.    GENERAL APPEARANCE: healthy and alert  MENTAL STATUS: alert  AIRWAY EXAM: Mallampatti Class II (visualization of the soft palate, fauces, and uvula)  RESP: lungs clear to auscultation - no rales, rhonchi or wheezes  CV: regular rates and rhythm      IMPRESSION   ASA Class 2 - Mild systemic disease        PLAN:     Plan for colonoscopy. We discussed the risks, benefits and alternatives and the patient wished to proceed.    The above has been forwarded to the consulting provider.      GARY JACOB MD  [facility]  Phone: [phone]  Fax: [phone]  October 11, 2024    "

## 2024-10-11 NOTE — DISCHARGE INSTRUCTIONS
Post-Colonoscopy Discharge Instructions:    1. You have just had an examination of your colon (large intestine) called a colonoscopy. You should have a full report of the findings to take with you today. It will list if any polyps were removed or if any biopsies were taken to send to the laboratory. If we did take out polyps or do biopsies, you should get a letter in the next 1-2 weeks if they are the standard pre-cancerous polyps. If it is anything more serious, your doctor will call you. The timing of your next colonoscopy is determined by your family history of polyps/colon cancer and what findings were on your colonoscopy. [facility] will keep track of when you are due for your next colonoscopy and contact you.     2. No driving or operating heavy power equipment today.    3. Do not drink alcohol for 12 hours after the procedure.     4. Do not sign any important or legally binding papers today.    5. No strenuous activity today (its a great day for a nap and to lay on the couch and watch some TV!).     6. You can eat whatever you like after your procedure.     7. You may experience drowsiness or forgetfulness at first. You may also notice altered  bowel habits, excessive gas and belching or bloated feeling.    8. If recommended by your physician, you should avoid Aspirin, Aspirin products and   Arthritis medications for 7 - 10 days following the exam if biopsies or polypectomies   have been performed.    9. You may develop soreness or redness where your IV medication was given.    Apply warm wet cloths to the area for 15 minutes 3 - 4 times per day.    10. You may have a bloated, gaseous feeling in your abdomen after a  Colonoscopy for the next few hours. Passing gas and belching will help. Walking or lying down on your left side with your knees flexed may relieve the discomfort.    11. Call the office at [phone] right away if you notice any of the following:  a. Vomiting of blood  and/or  coffee ground  material.  b. Rectal bleeding - >1Tbsp, blood clots, or continuous bleeding.  c. Severe abdominal pain.  d. Chills, fever over 101 degrees F.  e. Persistent nausea or vomiting.  f. Persistent disorientation/confusion.  g. Persistent coughing or spitting up blood.  h. Persistent redness, hardness, or tenderness at IV site.

## 2024-10-12 ENCOUNTER — HEALTH MAINTENANCE LETTER (OUTPATIENT)
Age: 54
End: 2024-10-12

## 2024-10-15 LAB
PATH REPORT.COMMENTS IMP SPEC: NORMAL
PATH REPORT.COMMENTS IMP SPEC: NORMAL
PATH REPORT.FINAL DX SPEC: NORMAL
PATH REPORT.GROSS SPEC: NORMAL
PATH REPORT.MICROSCOPIC SPEC OTHER STN: NORMAL
PATH REPORT.RELEVANT HX SPEC: NORMAL
PHOTO IMAGE: NORMAL

## 2024-11-10 DIAGNOSIS — J45.20 INTERMITTENT ASTHMA, UNCOMPLICATED: ICD-10-CM

## 2024-11-11 RX ORDER — ALBUTEROL SULFATE 90 UG/1
INHALANT RESPIRATORY (INHALATION)
Qty: 18 G | Refills: 0 | Status: SHIPPED | OUTPATIENT
Start: 2024-11-11

## 2024-12-16 DIAGNOSIS — E03.9 HYPOTHYROIDISM, UNSPECIFIED TYPE: ICD-10-CM

## 2024-12-17 RX ORDER — LEVOTHYROXINE SODIUM 150 UG/1
150 TABLET ORAL DAILY
Qty: 90 TABLET | Refills: 0 | Status: SHIPPED | OUTPATIENT
Start: 2024-12-17

## 2024-12-17 NOTE — TELEPHONE ENCOUNTER
Let patient know medication is at pharmacy for  and she need to schedule just a lab visit. Patient states that she will call back and schedule      Connor Khoury MA

## 2024-12-21 ENCOUNTER — HEALTH MAINTENANCE LETTER (OUTPATIENT)
Age: 54
End: 2024-12-21

## 2024-12-26 ENCOUNTER — LAB (OUTPATIENT)
Dept: LAB | Facility: CLINIC | Age: 54
End: 2024-12-26
Payer: COMMERCIAL

## 2024-12-26 DIAGNOSIS — E03.9 HYPOTHYROIDISM, UNSPECIFIED TYPE: ICD-10-CM

## 2024-12-26 LAB
T4 FREE SERPL-MCNC: 0.71 NG/DL (ref 0.9–1.7)
TSH SERPL DL<=0.005 MIU/L-ACNC: 61.1 UIU/ML (ref 0.3–4.2)

## 2025-03-03 NOTE — RESULT ENCOUNTER NOTE
Carol Ann,     Your urine and vaginal cultures were normal. No infections. So we should just monitor your symptoms for now.   Your thyroid is stable we will keep you on the same dose of medication.   Your sugar level is just on the border of pre-diabetes. Continuing to work on a healthy lifestyle with regular exercise can help reduce your risk of developing diabetes. Your cholesterol is normal for your age and risk factors. Your hemoglobin while still elevated is stable without concern.   Anastasiya Turner PA-C  
1.78

## 2025-04-14 ENCOUNTER — MYC REFILL (OUTPATIENT)
Dept: FAMILY MEDICINE | Facility: CLINIC | Age: 55
End: 2025-04-14
Payer: COMMERCIAL

## 2025-04-14 DIAGNOSIS — E03.9 HYPOTHYROIDISM, UNSPECIFIED TYPE: ICD-10-CM

## 2025-04-15 RX ORDER — LEVOTHYROXINE SODIUM 150 UG/1
150 TABLET ORAL DAILY
Qty: 90 TABLET | Refills: 0 | Status: SHIPPED | OUTPATIENT
Start: 2025-04-15

## 2025-05-02 DIAGNOSIS — J45.20 INTERMITTENT ASTHMA, UNCOMPLICATED: ICD-10-CM

## 2025-05-05 RX ORDER — ALBUTEROL SULFATE 90 UG/1
INHALANT RESPIRATORY (INHALATION)
Qty: 18 G | Refills: 0 | Status: SHIPPED | OUTPATIENT
Start: 2025-05-05

## 2025-07-02 ASSESSMENT — ASTHMA QUESTIONNAIRES
QUESTION_2 LAST FOUR WEEKS HOW OFTEN HAVE YOU HAD SHORTNESS OF BREATH: NOT AT ALL
QUESTION_1 LAST FOUR WEEKS HOW MUCH OF THE TIME DID YOUR ASTHMA KEEP YOU FROM GETTING AS MUCH DONE AT WORK, SCHOOL OR AT HOME: NONE OF THE TIME
QUESTION_3 LAST FOUR WEEKS HOW OFTEN DID YOUR ASTHMA SYMPTOMS (WHEEZING, COUGHING, SHORTNESS OF BREATH, CHEST TIGHTNESS OR PAIN) WAKE YOU UP AT NIGHT OR EARLIER THAN USUAL IN THE MORNING: NOT AT ALL
ACT_TOTALSCORE: 22
QUESTION_4 LAST FOUR WEEKS HOW OFTEN HAVE YOU USED YOUR RESCUE INHALER OR NEBULIZER MEDICATION (SUCH AS ALBUTEROL): TWO OR THREE TIMES PER WEEK
QUESTION_5 LAST FOUR WEEKS HOW WOULD YOU RATE YOUR ASTHMA CONTROL: WELL CONTROLLED

## 2025-07-03 ENCOUNTER — OFFICE VISIT (OUTPATIENT)
Dept: FAMILY MEDICINE | Facility: CLINIC | Age: 55
End: 2025-07-03
Payer: COMMERCIAL

## 2025-07-03 VITALS
OXYGEN SATURATION: 95 % | HEIGHT: 65 IN | TEMPERATURE: 98.4 F | BODY MASS INDEX: 28.66 KG/M2 | HEART RATE: 62 BPM | WEIGHT: 172 LBS | RESPIRATION RATE: 16 BRPM | SYSTOLIC BLOOD PRESSURE: 104 MMHG | DIASTOLIC BLOOD PRESSURE: 71 MMHG

## 2025-07-03 DIAGNOSIS — Z87.891 HISTORY OF TOBACCO USE, PRESENTING HAZARDS TO HEALTH: ICD-10-CM

## 2025-07-03 DIAGNOSIS — L72.9 SCALP CYST: ICD-10-CM

## 2025-07-03 DIAGNOSIS — R20.2 LEG PARESTHESIA: ICD-10-CM

## 2025-07-03 DIAGNOSIS — J45.20 INTERMITTENT ASTHMA, UNCOMPLICATED: ICD-10-CM

## 2025-07-03 DIAGNOSIS — R11.0 NAUSEA: ICD-10-CM

## 2025-07-03 DIAGNOSIS — Z12.31 VISIT FOR SCREENING MAMMOGRAM: ICD-10-CM

## 2025-07-03 DIAGNOSIS — E03.9 HYPOTHYROIDISM, UNSPECIFIED TYPE: ICD-10-CM

## 2025-07-03 DIAGNOSIS — R82.90 ABNORMAL URINALYSIS: ICD-10-CM

## 2025-07-03 DIAGNOSIS — R10.9 RIGHT FLANK PAIN: Primary | ICD-10-CM

## 2025-07-03 LAB
ALBUMIN SERPL BCG-MCNC: 4.1 G/DL (ref 3.5–5.2)
ALBUMIN UR-MCNC: NEGATIVE MG/DL
ALP SERPL-CCNC: 52 U/L (ref 40–150)
ALT SERPL W P-5'-P-CCNC: 9 U/L (ref 0–50)
ANION GAP SERPL CALCULATED.3IONS-SCNC: 11 MMOL/L (ref 7–15)
APPEARANCE UR: CLEAR
AST SERPL W P-5'-P-CCNC: 19 U/L (ref 0–45)
BACTERIA #/AREA URNS HPF: ABNORMAL /HPF
BILIRUB SERPL-MCNC: 0.4 MG/DL
BILIRUB UR QL STRIP: NEGATIVE
BUN SERPL-MCNC: 12.1 MG/DL (ref 6–20)
CALCIUM SERPL-MCNC: 9.4 MG/DL (ref 8.8–10.4)
CHLORIDE SERPL-SCNC: 105 MMOL/L (ref 98–107)
COLOR UR AUTO: YELLOW
CREAT SERPL-MCNC: 0.89 MG/DL (ref 0.51–0.95)
EGFRCR SERPLBLD CKD-EPI 2021: 77 ML/MIN/1.73M2
ERYTHROCYTE [DISTWIDTH] IN BLOOD BY AUTOMATED COUNT: 13.1 % (ref 10–15)
EST. AVERAGE GLUCOSE BLD GHB EST-MCNC: 108 MG/DL
GLUCOSE SERPL-MCNC: 97 MG/DL (ref 70–99)
GLUCOSE UR STRIP-MCNC: NEGATIVE MG/DL
HBA1C MFR BLD: 5.4 % (ref 0–5.6)
HCO3 SERPL-SCNC: 25 MMOL/L (ref 22–29)
HCT VFR BLD AUTO: 48 % (ref 35–47)
HGB BLD-MCNC: 15.8 G/DL (ref 11.7–15.7)
HGB UR QL STRIP: ABNORMAL
KETONES UR STRIP-MCNC: NEGATIVE MG/DL
LEUKOCYTE ESTERASE UR QL STRIP: ABNORMAL
MCH RBC QN AUTO: 29.8 PG (ref 26.5–33)
MCHC RBC AUTO-ENTMCNC: 32.9 G/DL (ref 31.5–36.5)
MCV RBC AUTO: 91 FL (ref 78–100)
NITRATE UR QL: NEGATIVE
PH UR STRIP: 5.5 [PH] (ref 5–7)
PLATELET # BLD AUTO: 344 10E3/UL (ref 150–450)
POTASSIUM SERPL-SCNC: 4.4 MMOL/L (ref 3.4–5.3)
PROT SERPL-MCNC: 6.5 G/DL (ref 6.4–8.3)
RBC # BLD AUTO: 5.3 10E6/UL (ref 3.8–5.2)
RBC #/AREA URNS AUTO: ABNORMAL /HPF
SODIUM SERPL-SCNC: 141 MMOL/L (ref 135–145)
SP GR UR STRIP: 1.02 (ref 1–1.03)
SQUAMOUS #/AREA URNS AUTO: ABNORMAL /LPF
T4 FREE SERPL-MCNC: 1.85 NG/DL (ref 0.9–1.7)
TSH SERPL DL<=0.005 MIU/L-ACNC: 0.07 UIU/ML (ref 0.3–4.2)
UROBILINOGEN UR STRIP-ACNC: 0.2 E.U./DL
VIT B12 SERPL-MCNC: 281 PG/ML (ref 232–1245)
WBC # BLD AUTO: 8.5 10E3/UL (ref 4–11)
WBC #/AREA URNS AUTO: ABNORMAL /HPF

## 2025-07-03 RX ORDER — ALBUTEROL SULFATE 90 UG/1
INHALANT RESPIRATORY (INHALATION)
Qty: 18 G | Refills: 2 | Status: SHIPPED | OUTPATIENT
Start: 2025-07-03

## 2025-07-03 RX ORDER — ALBUTEROL SULFATE 90 UG/1
INHALANT RESPIRATORY (INHALATION)
Qty: 18 G | Refills: 0 | Status: CANCELLED | OUTPATIENT
Start: 2025-07-03

## 2025-07-03 RX ORDER — OMEPRAZOLE 20 MG/1
20 CAPSULE, DELAYED RELEASE ORAL DAILY
Qty: 90 CAPSULE | Refills: 0 | Status: SHIPPED | OUTPATIENT
Start: 2025-07-03

## 2025-07-03 ASSESSMENT — ENCOUNTER SYMPTOMS: BACK PAIN: 1

## 2025-07-03 NOTE — PROGRESS NOTES
Assessment & Plan     Right flank pain  Concern for kidney stone. CT ordered.   - Comprehensive metabolic panel; Future  - UA Macroscopic with reflex to Microscopic and Culture; Future  - CT Abdomen Pelvis w/o Contrast; Future  - Comprehensive metabolic panel  - UA Macroscopic with reflex to Microscopic and Culture  - UA Microscopic with Reflex to Culture    Visit for screening mammogram  Encouraged patient to complete.     History of tobacco use, presenting hazards to health  Cutting back, but still smoking.     Intermittent asthma, uncomplicated  Using prn. Refilled.   - albuterol (PROAIR HFA/PROVENTIL HFA/VENTOLIN HFA) 108 (90 Base) MCG/ACT inhaler; INHALE 2 PUFFS INTO THE LUNGS EVERY 6 HOURS AS NEEDED FOR SHORTNESS OF BREATH OR DIFFICULT BREATHING OR WHEEZING    Leg paresthesia  Could be related to her thyroid labs, B12, or back pain. Will get labs and follow up.   - Vitamin B12; Future  - Hemoglobin A1c; Future  - CBC with platelets; Future  - Vitamin B12  - Hemoglobin A1c  - CBC with platelets    Hypothyroidism, unspecified type  Patient never followed up with grossly abnormal TSH in December. Recheck today and determine next steps.   - TSH  - (Today); Future  - T4 free; Future  - TSH  - (Today)  - T4 free    Nausea  Likely heartburn related. Start omeprazole daily.   - omeprazole (PRILOSEC) 20 MG DR capsule; Take 1 capsule (20 mg) by mouth daily.    Scalp cyst  Referral to derm to discuss.   - Adult Dermatology  Referral; Future    Abnormal urinalysis  - Urine Culture; Future  - Urine Culture    The longitudinal plan of care for the diagnosis(es)/condition(s) as documented were addressed during this visit. Due to the added complexity in care, I will continue to support Carol Ann in the subsequent management and with ongoing continuity of care.      Nicotine/Tobacco Cessation  She reports that she has been smoking cigarettes. She started smoking about 32 years ago. She has a 25 pack-year smoking  "history. She has been exposed to tobacco smoke. She has never used smokeless tobacco.  Nicotine/Tobacco Cessation Plan  Information offered: Patient not interested at this time      BMI  Estimated body mass index is 28.62 kg/m  as calculated from the following:    Height as of this encounter: 1.651 m (5' 5\").    Weight as of this encounter: 78 kg (172 lb).             Toshia Maharaj is a 54 year old, presenting for the following health issues:  Back Pain, tingling in feet, Musculoskeletal Problem (Right leg pain ), heart flutters , and Thyroid Disease        7/3/2025     6:42 AM   Additional Questions   Roomed by Valeria CARR     Back Pain     History of Present Illness       Reason for visit:  Pain in my side, lower back pain, feet tingling and sore  Symptom onset:  More than a month  Symptoms include:  See above  Symptom intensity:  Moderate  Symptom progression:  Worsening  Had these symptoms before:  No  What makes it worse:  Laying on my side  What makes it better:  No   She is taking medications regularly.      Pain in her side, noticed when she would roll over in bed. Prevents her from laying on her right side at bed. Can hurt when touching.   Slightly worsening.   Has not taken any medications.   No trauma know.   No pain with urination, no change in color.     History of gallbladder removal,     Right leg and foot feels like it is getting strangled. Top of the foot gets sore achey pain.   Can prevent her from driving and car trips   Has been going on for 6 months.   Can feel tingling in both feet, daily.       Heart fluttering, feels like a butterfly in her chest. Maybe monthly or less.     Cyst on the top of her head now causing her pain.                     Objective    /71 (BP Location: Left arm, Patient Position: Sitting, Cuff Size: Adult Regular)   Pulse 62   Temp 98.4  F (36.9  C) (Temporal)   Resp 16   Ht 1.651 m (5' 5\")   Wt 78 kg (172 lb)   LMP 09/30/2020 (Exact Date)   SpO2 95%   " BMI 28.62 kg/m    Body mass index is 28.62 kg/m .  Physical Exam   GENERAL: alert and no distress  RESP: lungs clear to auscultation - no rales, rhonchi or wheezes  CV: regular rate and rhythm, normal S1 S2, no S3 or S4, no murmur, click or rub, no peripheral edema  ABDOMEN: soft, nontender, no hepatosplenomegaly, no masses and bowel sounds normal  MS: no gross musculoskeletal defects noted, no edema- pain with palpation of the bilateral SI joints. Pain with palpation of the right kidney  SKIN: scalp cyst vs lipoma noted.   NEURO: Normal strength and tone, mentation intact and speech normal  PSYCH: mentation appears normal, affect normal/bright            Signed Electronically by: Anastasiya Turner PA-C

## 2025-07-03 NOTE — PATIENT INSTRUCTIONS
Consider scheduling mammo with the CT scan.     Schedule CT scan.     Start omeprazole 20mg daily.

## 2025-07-07 ENCOUNTER — PATIENT OUTREACH (OUTPATIENT)
Dept: CARE COORDINATION | Facility: CLINIC | Age: 55
End: 2025-07-07
Payer: COMMERCIAL

## 2025-07-15 ENCOUNTER — ANCILLARY PROCEDURE (OUTPATIENT)
Dept: CT IMAGING | Facility: CLINIC | Age: 55
End: 2025-07-15
Attending: PHYSICIAN ASSISTANT
Payer: COMMERCIAL

## 2025-07-15 DIAGNOSIS — R10.9 RIGHT FLANK PAIN: ICD-10-CM

## 2025-07-15 PROCEDURE — 74176 CT ABD & PELVIS W/O CONTRAST: CPT | Performed by: STUDENT IN AN ORGANIZED HEALTH CARE EDUCATION/TRAINING PROGRAM

## 2025-07-16 ENCOUNTER — PATIENT OUTREACH (OUTPATIENT)
Dept: CARE COORDINATION | Facility: CLINIC | Age: 55
End: 2025-07-16
Payer: COMMERCIAL

## 2025-07-16 ENCOUNTER — TELEPHONE (OUTPATIENT)
Dept: FAMILY MEDICINE | Facility: CLINIC | Age: 55
End: 2025-07-16
Payer: COMMERCIAL

## 2025-07-16 NOTE — TELEPHONE ENCOUNTER
RN called patient/family and relayed provider's message. Patient/family verbalized understanding. Pt states she has already seen the results (lab and CT results). States she is also aware of urology referral.      Michelle PRADO RN, BSN  [facility]: [place]

## 2025-07-17 ENCOUNTER — TELEPHONE (OUTPATIENT)
Dept: FAMILY MEDICINE | Facility: CLINIC | Age: 55
End: 2025-07-17
Payer: COMMERCIAL

## 2025-07-17 NOTE — TELEPHONE ENCOUNTER
LUIS Coffey at [facility]    Julian was seen for a cyst on her scalp. Paty doesn't feel it is actually a cyst. It feels more like an undefined soft tissue mass. Paty recommends imaging, no specific recommendations for imaging (CT vs. MRI). Paty talked with patient about it and with symptoms she's having would prefer Anastasiya to order imaging and refer out for follow up based on results.    Most likely would not excise in their clinic.    If needed Paty is open for further questions  P: [phone]     Yaz Marmolejo RN on 7/17/2025 at 4:25 PM

## 2025-07-18 ENCOUNTER — MYC MEDICAL ADVICE (OUTPATIENT)
Dept: FAMILY MEDICINE | Facility: CLINIC | Age: 55
End: 2025-07-18
Payer: COMMERCIAL

## 2025-07-18 DIAGNOSIS — R22.0 SCALP MASS: Primary | ICD-10-CM

## 2025-07-24 ENCOUNTER — VIRTUAL VISIT (OUTPATIENT)
Dept: FAMILY MEDICINE | Facility: CLINIC | Age: 55
End: 2025-07-24
Payer: COMMERCIAL

## 2025-07-24 DIAGNOSIS — M54.6 ACUTE RIGHT-SIDED THORACIC BACK PAIN: ICD-10-CM

## 2025-07-24 DIAGNOSIS — N20.0 NEPHROLITHIASIS: Primary | ICD-10-CM

## 2025-07-24 DIAGNOSIS — N95.1 MENOPAUSAL SYNDROME (HOT FLASHES): ICD-10-CM

## 2025-07-24 RX ORDER — PROGESTERONE 100 MG/1
100 CAPSULE ORAL DAILY
Qty: 90 CAPSULE | Refills: 0 | Status: SHIPPED | OUTPATIENT
Start: 2025-07-24

## 2025-07-24 RX ORDER — CYCLOBENZAPRINE HCL 10 MG
10 TABLET ORAL
Qty: 30 TABLET | Refills: 0 | Status: SHIPPED | OUTPATIENT
Start: 2025-07-24

## 2025-07-24 NOTE — PROGRESS NOTES
"Carol Ann is a 54 year old who is being evaluated via a billable video visit.    How would you like to obtain your AVS? MyChart  If the video visit is dropped, the invitation should be resent by: Text to cell phone: [phone]  Will anyone else be joining your video visit? No      Assessment & Plan     Nephrolithiasis  Tiny, not likely to contribute to the level of pain she has. Could be causing the RBC in the urine, but patient is a smoker and could also be concerned about bladder etiology. Encouraged urology referral.     Acute right-sided thoracic back pain  Trial of exercises, lidocaine patches and flexeril as needed for the pain.   - cyclobenzaprine (FLEXERIL) 10 MG tablet; Take 1 tablet (10 mg) by mouth nightly as needed for muscle spasms.    Menopausal syndrome (hot flashes)  Patient is a smoker, unable to use estrogen products, declines SSRI/SNRI. Trial of progesterone to see if helpful for sleep. If not helping in 2 weeks reach out.   - progesterone (PROMETRIUM) 100 MG capsule; Take 1 capsule (100 mg) by mouth daily.  The longitudinal plan of care for the diagnosis(es)/condition(s) as documented were addressed during this visit. Due to the added complexity in care, I will continue to support Carol Ann in the subsequent management and with ongoing continuity of care.  BMI  Estimated body mass index is 28.62 kg/m  as calculated from the following:    Height as of 7/3/25: 1.651 m (5' 5\").    Weight as of 7/3/25: 78 kg (172 lb).           Subjective   Carol Ann is a 54 year old, presenting for the following health issues:  Thyroid Disease and Follow Up (Follow up from last appointment.)        7/24/2025     2:05 PM   Additional Questions   Roomed by Valeria CARR     History of Present Illness       Reason for visit:  Follow up on thyroid recommendation and questions from my last appointment    She eats 2-3 servings of fruits and vegetables daily.She consumes 1 sweetened beverage(s) daily.She exercises with enough effort to " increase her heart rate 20 to 29 minutes per day.  She exercises with enough effort to increase her heart rate 3 or less days per week.   She is taking medications regularly.      Flank pain- ache under the rib on her right side.   Does have pain with pressure.     Menopause- a years worth no sleep. Up 5-6 times a night with hot flashes.                   Objective           Vitals:  No vitals were obtained today due to virtual visit.    Physical Exam   GENERAL: alert and no distress  EYES: Eyes grossly normal to inspection.  No discharge or erythema, or obvious scleral/conjunctival abnormalities.  RESP: No audible wheeze, cough, or visible cyanosis.    SKIN: Visible skin clear. No significant rash, abnormal pigmentation or lesions.  NEURO: Cranial nerves grossly intact.  Mentation and speech appropriate for age.  PSYCH: Appropriate affect, tone, and pace of words          Video-Visit Details    Type of service:  Video Visit   Originating Location (pt. Location): Home    Distant Location (provider location):  On-site  Platform used for Video Visit: G[employer]  Signed Electronically by: Anastasiya Turner PA-C

## 2025-08-01 ENCOUNTER — ANCILLARY PROCEDURE (OUTPATIENT)
Dept: CT IMAGING | Facility: CLINIC | Age: 55
End: 2025-08-01
Attending: PHYSICIAN ASSISTANT
Payer: COMMERCIAL

## 2025-08-01 DIAGNOSIS — R22.0 SCALP MASS: ICD-10-CM

## 2025-08-01 PROCEDURE — 70450 CT HEAD/BRAIN W/O DYE: CPT | Performed by: RADIOLOGY

## 2025-08-04 ENCOUNTER — RESULTS FOLLOW-UP (OUTPATIENT)
Dept: FAMILY MEDICINE | Facility: CLINIC | Age: 55
End: 2025-08-04
Payer: COMMERCIAL

## 2025-08-04 DIAGNOSIS — E03.9 HYPOTHYROIDISM, UNSPECIFIED TYPE: ICD-10-CM

## 2025-08-04 RX ORDER — LEVOTHYROXINE SODIUM 137 UG/1
137 TABLET ORAL DAILY
Qty: 90 TABLET | Refills: 0 | Status: SHIPPED | OUTPATIENT
Start: 2025-08-04

## 2025-08-04 RX ORDER — LEVOTHYROXINE SODIUM 137 UG/1
137 TABLET ORAL DAILY
Qty: 90 TABLET | Refills: 0 | Status: SHIPPED | OUTPATIENT
Start: 2025-08-04 | End: 2025-08-04

## (undated) DEVICE — KIT ENDO FIRST STEP DISINFECTANT 200ML W/POUCH EP-4

## (undated) DEVICE — PREP CHLORAPREP 26ML TINTED ORANGE  260815

## (undated) DEVICE — PAD CHUX UNDERPAD 23X24" 7136

## (undated) RX ORDER — DIPHENHYDRAMINE HYDROCHLORIDE 50 MG/ML
INJECTION INTRAMUSCULAR; INTRAVENOUS
Status: DISPENSED
Start: 2024-10-11

## (undated) RX ORDER — FENTANYL CITRATE 50 UG/ML
INJECTION, SOLUTION INTRAMUSCULAR; INTRAVENOUS
Status: DISPENSED
Start: 2024-10-11